# Patient Record
Sex: FEMALE | Employment: OTHER | URBAN - METROPOLITAN AREA
[De-identification: names, ages, dates, MRNs, and addresses within clinical notes are randomized per-mention and may not be internally consistent; named-entity substitution may affect disease eponyms.]

---

## 2019-08-09 ENCOUNTER — HOSPITAL ENCOUNTER (OUTPATIENT)
Dept: MRI IMAGING | Age: 84
Discharge: HOME | End: 2019-08-09
Attending: GENERAL PRACTICE
Payer: MEDICARE

## 2019-08-09 DIAGNOSIS — R93.7 ABNORMAL FINDINGS ON DIAGNOSTIC IMAGING OF SPINE: ICD-10-CM

## 2019-08-09 DIAGNOSIS — S32.040A WEDGE COMPRESSION FRACTURE OF FOURTH LUMBAR VERTEBRA (HCC): ICD-10-CM

## 2019-08-09 DIAGNOSIS — M41.26 IDIOPATHIC SCOLIOSIS OF LUMBAR SPINE: ICD-10-CM

## 2019-08-09 DIAGNOSIS — M47.26 OTHER SPONDYLOSIS WITH RADICULOPATHY, LUMBAR REGION: ICD-10-CM

## 2019-08-09 PROCEDURE — 72148 MRI LUMBAR SPINE W/O DYE: CPT

## 2019-08-19 ENCOUNTER — HOSPITAL ENCOUNTER (OUTPATIENT)
Dept: INTERVENTIONAL RADIOLOGY/VASCULAR | Age: 84
Discharge: HOME | End: 2019-08-19
Attending: GENERAL PRACTICE | Admitting: RADIOLOGY
Payer: MEDICARE

## 2019-08-19 VITALS
RESPIRATION RATE: 14 BRPM | WEIGHT: 114 LBS | HEIGHT: 59 IN | TEMPERATURE: 97.9 F | HEART RATE: 65 BPM | SYSTOLIC BLOOD PRESSURE: 128 MMHG | OXYGEN SATURATION: 98 % | BODY MASS INDEX: 22.98 KG/M2 | DIASTOLIC BLOOD PRESSURE: 54 MMHG

## 2019-08-19 DIAGNOSIS — S32.040A WEDGE COMPRESSION FRACTURE OF FOURTH LUMBAR VERTEBRA (HCC): ICD-10-CM

## 2019-08-19 PROCEDURE — 74011250636 HC RX REV CODE- 250/636: Performed by: RADIOLOGY

## 2019-08-19 PROCEDURE — 77030021783 HC SYS CEM DEL MEDT -D

## 2019-08-19 PROCEDURE — 22514 PERQ VERTEBRAL AUGMENTATION: CPT

## 2019-08-19 PROCEDURE — 77030034842 HC TAMP SPN BN INFL EXP II KYPH -I

## 2019-08-19 PROCEDURE — 74011636320 HC RX REV CODE- 636/320: Performed by: RADIOLOGY

## 2019-08-19 PROCEDURE — C1713 ANCHOR/SCREW BN/BN,TIS/BN: HCPCS

## 2019-08-19 PROCEDURE — 74011250637 HC RX REV CODE- 250/637: Performed by: RADIOLOGY

## 2019-08-19 PROCEDURE — 77030003666 HC NDL SPINAL BD -A

## 2019-08-19 PROCEDURE — 77030021782 HC SYS CEM CART DEL KYPH -C

## 2019-08-19 PROCEDURE — 74011000250 HC RX REV CODE- 250: Performed by: RADIOLOGY

## 2019-08-19 RX ORDER — KETOROLAC TROMETHAMINE 30 MG/ML
15 INJECTION, SOLUTION INTRAMUSCULAR; INTRAVENOUS AS NEEDED
Status: DISCONTINUED | OUTPATIENT
Start: 2019-08-19 | End: 2019-08-19 | Stop reason: HOSPADM

## 2019-08-19 RX ORDER — LIDOCAINE HYDROCHLORIDE 10 MG/ML
10 INJECTION, SOLUTION EPIDURAL; INFILTRATION; INTRACAUDAL; PERINEURAL ONCE
Status: COMPLETED | OUTPATIENT
Start: 2019-08-19 | End: 2019-08-19

## 2019-08-19 RX ORDER — IBUPROFEN 200 MG
200 TABLET ORAL
COMMUNITY
End: 2020-11-19

## 2019-08-19 RX ORDER — TRIPROLIDINE/PSEUDOEPHEDRINE 2.5MG-60MG
200 TABLET ORAL
Status: COMPLETED | OUTPATIENT
Start: 2019-08-19 | End: 2019-08-19

## 2019-08-19 RX ORDER — SODIUM CHLORIDE 9 MG/ML
25 INJECTION, SOLUTION INTRAVENOUS CONTINUOUS
Status: DISCONTINUED | OUTPATIENT
Start: 2019-08-19 | End: 2019-08-19 | Stop reason: HOSPADM

## 2019-08-19 RX ORDER — CEFAZOLIN SODIUM/WATER 2 G/20 ML
2 SYRINGE (ML) INTRAVENOUS ONCE
Status: COMPLETED | OUTPATIENT
Start: 2019-08-19 | End: 2019-08-19

## 2019-08-19 RX ORDER — MIDAZOLAM HYDROCHLORIDE 1 MG/ML
5 INJECTION, SOLUTION INTRAMUSCULAR; INTRAVENOUS
Status: DISCONTINUED | OUTPATIENT
Start: 2019-08-19 | End: 2019-08-19 | Stop reason: HOSPADM

## 2019-08-19 RX ORDER — FENTANYL CITRATE 50 UG/ML
200 INJECTION, SOLUTION INTRAMUSCULAR; INTRAVENOUS
Status: DISCONTINUED | OUTPATIENT
Start: 2019-08-19 | End: 2019-08-19 | Stop reason: HOSPADM

## 2019-08-19 RX ORDER — BACITRACIN 500 UNIT/G
PACKET (EA) TOPICAL
Status: DISCONTINUED
Start: 2019-08-19 | End: 2019-08-19 | Stop reason: HOSPADM

## 2019-08-19 RX ORDER — BUPIVACAINE HYDROCHLORIDE 5 MG/ML
30 INJECTION, SOLUTION EPIDURAL; INTRACAUDAL ONCE
Status: COMPLETED | OUTPATIENT
Start: 2019-08-19 | End: 2019-08-19

## 2019-08-19 RX ADMIN — MIDAZOLAM HYDROCHLORIDE 0.5 MG: 1 INJECTION, SOLUTION INTRAMUSCULAR; INTRAVENOUS at 10:22

## 2019-08-19 RX ADMIN — MIDAZOLAM HYDROCHLORIDE 1 MG: 1 INJECTION, SOLUTION INTRAMUSCULAR; INTRAVENOUS at 10:28

## 2019-08-19 RX ADMIN — FENTANYL CITRATE 25 MCG: 50 INJECTION, SOLUTION INTRAMUSCULAR; INTRAVENOUS at 10:17

## 2019-08-19 RX ADMIN — MIDAZOLAM HYDROCHLORIDE 0.5 MG: 1 INJECTION, SOLUTION INTRAMUSCULAR; INTRAVENOUS at 10:17

## 2019-08-19 RX ADMIN — LIDOCAINE HYDROCHLORIDE 10 ML: 10 INJECTION, SOLUTION EPIDURAL; INFILTRATION; INTRACAUDAL; PERINEURAL at 10:44

## 2019-08-19 RX ADMIN — BUPIVACAINE HYDROCHLORIDE 10 ML: 5 INJECTION, SOLUTION EPIDURAL; INTRACAUDAL; PERINEURAL at 10:43

## 2019-08-19 RX ADMIN — SODIUM CHLORIDE 25 ML/HR: 900 INJECTION, SOLUTION INTRAVENOUS at 09:30

## 2019-08-19 RX ADMIN — FENTANYL CITRATE 25 MCG: 50 INJECTION, SOLUTION INTRAMUSCULAR; INTRAVENOUS at 10:15

## 2019-08-19 RX ADMIN — IBUPROFEN 200 MG: 200 SUSPENSION ORAL at 12:46

## 2019-08-19 RX ADMIN — KETOROLAC TROMETHAMINE 15 MG: 30 INJECTION, SOLUTION INTRAMUSCULAR at 10:00

## 2019-08-19 RX ADMIN — Medication 2 G: at 09:59

## 2019-08-19 RX ADMIN — MIDAZOLAM HYDROCHLORIDE 0.5 MG: 1 INJECTION, SOLUTION INTRAMUSCULAR; INTRAVENOUS at 10:15

## 2019-08-19 RX ADMIN — IOHEXOL 20 ML: 240 INJECTION, SOLUTION INTRATHECAL; INTRAVASCULAR; INTRAVENOUS; ORAL at 10:43

## 2019-08-19 RX ADMIN — FENTANYL CITRATE 25 MCG: 50 INJECTION, SOLUTION INTRAMUSCULAR; INTRAVENOUS at 10:22

## 2019-08-19 RX ADMIN — FENTANYL CITRATE 25 MCG: 50 INJECTION, SOLUTION INTRAMUSCULAR; INTRAVENOUS at 10:28

## 2019-08-19 RX ADMIN — MIDAZOLAM HYDROCHLORIDE 0.5 MG: 1 INJECTION, SOLUTION INTRAMUSCULAR; INTRAVENOUS at 10:31

## 2019-08-19 NOTE — H&P
Radiology History and Physical    Patient: Lashay Fields 80 y.o. female       Chief Complaint: No chief complaint on file. History of Present Illness: for kypho    History:    Past Medical History:   Diagnosis Date    Cancer ([facility] [address]     radiation and lumpectony    GERD (gastroesophageal reflux disease)      Family History   Problem Relation Age of Onset    Hypertension Mother     Heart Disease Mother     Cancer Mother         breast    Cancer Father         prostate     Social History     Socioeconomic History    Marital status:      Spouse name: Not on file    Number of children: Not on file    Years of education: Not on file    Highest education level: Not on file   Occupational History    Not on file   Social Needs    Financial resource strain: Not on file    Food insecurity:     Worry: Not on file     Inability: Not on file    Transportation needs:     Medical: Not on file     Non-medical: Not on file   Tobacco Use    Smoking status: Former Smoker    Smokeless tobacco: Never Used   Substance and Sexual Activity    Alcohol use: No    Drug use: No    Sexual activity: Not on file   Lifestyle    Physical activity:     Days per week: Not on file     Minutes per session: Not on file    Stress: Not on file   Relationships    Social connections:     Talks on phone: Not on file     Gets together: Not on file     Attends Alevism service: Not on file     Active member of club or organization: Not on file     Attends meetings of clubs or organizations: Not on file     Relationship status: Not on file    Intimate partner violence:     Fear of current or ex partner: Not on file     Emotionally abused: Not on file     Physically abused: Not on file     Forced sexual activity: Not on file   Other Topics Concern    Not on file   Social History Narrative    Not on file       Allergies:    Allergies   Allergen Reactions    Ciprofloxacin Other (comments)     Bad yeast inf    Codeine Nausea and Vomiting    Hydrocodone Other (comments)     \"burning the esophagus to the point patient can't swallow per family and patient\"    Morphine Nausea Only    Oxycodone Other (comments)     \"burning of esophagus\"       Current Medications:  Current Facility-Administered Medications   Medication Dose Route Frequency    lidocaine (PF) (XYLOCAINE) 10 mg/mL (1 %) injection 10 mL  10 mL SubCUTAneous ONCE    iohexol (OMNIPAQUE) 240 mg iodine/mL solution 30 mL  30 mL Intrathecal RAD ONCE    bupivacaine (PF) (MARCAINE) 0.5 % (5 mg/mL) injection 150 mg  30 mL SubCUTAneous ONCE    0.9% sodium chloride infusion  25 mL/hr IntraVENous CONTINUOUS    fentaNYL citrate (PF) injection 200 mcg  200 mcg IntraVENous Multiple    midazolam (VERSED) injection 5 mg  5 mg IntraVENous Multiple    ketorolac (TORADOL) injection 15 mg  15 mg IntraVENous PRN        Physical Exam:  Blood pressure 158/75, pulse 96, temperature 97.9 °F (36.6 °C), resp. rate 14, height 4' 11\" (1.499 m), weight 51.7 kg (114 lb), SpO2 100 %. LUNG: clear to auscultation bilaterally, HEART: regular rate and rhythm      Alerts:    Hospital Problems  Date Reviewed: 3/11/2019    None          Laboratory:    No results for input(s): HGB, HCT, WBC, PLT, INR, BUN, CREA, K, CRCLT, HGBEXT, HCTEXT, PLTEXT in the last 72 hours. No lab exists for component: PTT, PT, INREXT      Plan of Care/Planned Procedure:  Risks, benefits, and alternatives reviewed with patient and she agrees to proceed with the procedure.        Shun Bourgeois MD

## 2019-08-19 NOTE — DISCHARGE INSTRUCTIONS
KYPHOPLASTY/VERTEBROPLASTY DISCHARGE INSTRUCTIONS    General Information: This procedure is done to help with the back pain that is associated with compression fractures in the spine. The kyphoplasty involves placing a balloon into the space of the vertebrae that is fractured, blowing up the balloon, therefore realigning the broken pieces of bone, and then injecting cement into the space to strengthen the vertebrae. The vertebroplasty is only slightly different in that there is no balloon used. The Interventional Radiologist will speak with you and decide which procedure is best for you. The pain experienced from compression fractures is caused by the vertebrae not being stabilized. The cement stabilizes the bone, therefore reducing the pain. Home Care Instructions: You can resume your regular diet and medication regimen. Do not drink alcohol, drive, or make any important legal decisions in the next 24 hours. Do not lift anything heavier than a gallon of milk, or do anything strenuous for the next 24 hours. You will notice a dressing on your lower back after your procedure. This dressing can be removed in 24 hours. Showering is acceptable in 24 hours, but you should refrain from tub baths or swimming for 5 days. You will be asked to come back in for a recheck about 30 days after your procedure. At that time, our physician will ask you questions about your pain, and if it has improved. Call If:     You should call your Physician and/or the Radiology Nurse if you have any bleeding other than a small spot on your bandage. Call if you have any signs of infection, fever, or increased pain at the site. Call if you should have new or worsening pain in your back, or if you lose control of your bladder or bowel. Any tingling or loss of feeling or movement in your legs should also be reported. Follow-Up Instructions: Please see your ordering doctor as he/she has requested.      To Reach Us:  [phone] to 10 pm then [phone] after 10 pm    For left hand skin tear, change dressing once a day. Clean with normal saline, pat dry with gauze then apply telfa, non adhering dressing and wrap with coban. Please call above number for any concerns.     Patient Signature:  Date: 8/19/2019  Discharging Nurse: Malika Powell RN

## 2020-09-26 PROBLEM — D69.2 SENILE PURPURA (HCC): Status: ACTIVE | Noted: 2020-09-26

## 2020-11-13 ENCOUNTER — APPOINTMENT (OUTPATIENT)
Dept: GENERAL RADIOLOGY | Age: 85
DRG: 482 | End: 2020-11-13
Attending: STUDENT IN AN ORGANIZED HEALTH CARE EDUCATION/TRAINING PROGRAM
Payer: MEDICARE

## 2020-11-13 ENCOUNTER — APPOINTMENT (OUTPATIENT)
Dept: CT IMAGING | Age: 85
DRG: 482 | End: 2020-11-13
Attending: STUDENT IN AN ORGANIZED HEALTH CARE EDUCATION/TRAINING PROGRAM
Payer: MEDICARE

## 2020-11-13 ENCOUNTER — HOSPITAL ENCOUNTER (INPATIENT)
Age: 85
LOS: 5 days | Discharge: HOME HEALTH | DRG: 482 | End: 2020-11-19
Attending: STUDENT IN AN ORGANIZED HEALTH CARE EDUCATION/TRAINING PROGRAM | Admitting: INTERNAL MEDICINE
Payer: MEDICARE

## 2020-11-13 DIAGNOSIS — S72.001A CLOSED FRACTURE OF NECK OF RIGHT FEMUR, INITIAL ENCOUNTER (HCC): Primary | ICD-10-CM

## 2020-11-13 PROBLEM — I48.0 PAROXYSMAL ATRIAL FIBRILLATION (HCC): Status: ACTIVE | Noted: 2020-11-13

## 2020-11-13 PROBLEM — I48.20 CHRONIC ATRIAL FIBRILLATION (HCC): Status: ACTIVE | Noted: 2020-11-13

## 2020-11-13 LAB
ALBUMIN SERPL-MCNC: 4 G/DL (ref 3.5–5)
ALBUMIN/GLOB SERPL: 1.1 {RATIO} (ref 1.1–2.2)
ALP SERPL-CCNC: 91 U/L (ref 45–117)
ALT SERPL-CCNC: 28 U/L (ref 12–78)
ANION GAP SERPL CALC-SCNC: 8 MMOL/L (ref 5–15)
AST SERPL-CCNC: 22 U/L (ref 15–37)
BASOPHILS # BLD: 0 K/UL (ref 0–0.1)
BASOPHILS NFR BLD: 0 % (ref 0–1)
BILIRUB SERPL-MCNC: 0.7 MG/DL (ref 0.2–1)
BUN SERPL-MCNC: 29 MG/DL (ref 6–20)
BUN/CREAT SERPL: 29 (ref 12–20)
CALCIUM SERPL-MCNC: 9 MG/DL (ref 8.5–10.1)
CHLORIDE SERPL-SCNC: 100 MMOL/L (ref 97–108)
CO2 SERPL-SCNC: 28 MMOL/L (ref 21–32)
COMMENT, HOLDF: NORMAL
CREAT SERPL-MCNC: 0.99 MG/DL (ref 0.55–1.02)
DIFFERENTIAL METHOD BLD: ABNORMAL
EOSINOPHIL # BLD: 0 K/UL (ref 0–0.4)
EOSINOPHIL NFR BLD: 0 % (ref 0–7)
ERYTHROCYTE [DISTWIDTH] IN BLOOD BY AUTOMATED COUNT: 15.3 % (ref 11.5–14.5)
GLOBULIN SER CALC-MCNC: 3.6 G/DL (ref 2–4)
GLUCOSE SERPL-MCNC: 116 MG/DL (ref 65–100)
HCT VFR BLD AUTO: 34.3 % (ref 35–47)
HGB BLD-MCNC: 11.1 G/DL (ref 11.5–16)
IMM GRANULOCYTES # BLD AUTO: 0 K/UL (ref 0–0.04)
IMM GRANULOCYTES NFR BLD AUTO: 0 % (ref 0–0.5)
LYMPHOCYTES # BLD: 0.2 K/UL (ref 0.8–3.5)
LYMPHOCYTES NFR BLD: 2 % (ref 12–49)
MCH RBC QN AUTO: 27.9 PG (ref 26–34)
MCHC RBC AUTO-ENTMCNC: 32.4 G/DL (ref 30–36.5)
MCV RBC AUTO: 86.2 FL (ref 80–99)
MONOCYTES # BLD: 1.4 K/UL (ref 0–1)
MONOCYTES NFR BLD: 12 % (ref 5–13)
NEUTS SEG # BLD: 9.8 K/UL (ref 1.8–8)
NEUTS SEG NFR BLD: 86 % (ref 32–75)
NRBC # BLD: 0 K/UL (ref 0–0.01)
NRBC BLD-RTO: 0 PER 100 WBC
PLATELET # BLD AUTO: 206 K/UL (ref 150–400)
PMV BLD AUTO: 10.6 FL (ref 8.9–12.9)
POTASSIUM SERPL-SCNC: 3.4 MMOL/L (ref 3.5–5.1)
PROT SERPL-MCNC: 7.6 G/DL (ref 6.4–8.2)
RBC # BLD AUTO: 3.98 M/UL (ref 3.8–5.2)
RBC MORPH BLD: ABNORMAL
SAMPLES BEING HELD,HOLD: NORMAL
SODIUM SERPL-SCNC: 136 MMOL/L (ref 136–145)
WBC # BLD AUTO: 11.4 K/UL (ref 3.6–11)

## 2020-11-13 PROCEDURE — 72192 CT PELVIS W/O DYE: CPT

## 2020-11-13 PROCEDURE — 36415 COLL VENOUS BLD VENIPUNCTURE: CPT

## 2020-11-13 PROCEDURE — 71045 X-RAY EXAM CHEST 1 VIEW: CPT

## 2020-11-13 PROCEDURE — 96374 THER/PROPH/DIAG INJ IV PUSH: CPT

## 2020-11-13 PROCEDURE — 96375 TX/PRO/DX INJ NEW DRUG ADDON: CPT

## 2020-11-13 PROCEDURE — 74011250636 HC RX REV CODE- 250/636: Performed by: STUDENT IN AN ORGANIZED HEALTH CARE EDUCATION/TRAINING PROGRAM

## 2020-11-13 PROCEDURE — 80053 COMPREHEN METABOLIC PANEL: CPT

## 2020-11-13 PROCEDURE — 86900 BLOOD TYPING SEROLOGIC ABO: CPT

## 2020-11-13 PROCEDURE — 74011250637 HC RX REV CODE- 250/637: Performed by: PHYSICIAN ASSISTANT

## 2020-11-13 PROCEDURE — 85025 COMPLETE CBC W/AUTO DIFF WBC: CPT

## 2020-11-13 PROCEDURE — 99285 EMERGENCY DEPT VISIT HI MDM: CPT

## 2020-11-13 PROCEDURE — 73502 X-RAY EXAM HIP UNI 2-3 VIEWS: CPT

## 2020-11-13 PROCEDURE — 93005 ELECTROCARDIOGRAM TRACING: CPT

## 2020-11-13 RX ORDER — ACETAMINOPHEN 325 MG/1
650 TABLET ORAL
Status: DISCONTINUED | OUTPATIENT
Start: 2020-11-13 | End: 2020-11-19 | Stop reason: HOSPADM

## 2020-11-13 RX ORDER — FENTANYL CITRATE 50 UG/ML
50 INJECTION, SOLUTION INTRAMUSCULAR; INTRAVENOUS ONCE
Status: COMPLETED | OUTPATIENT
Start: 2020-11-13 | End: 2020-11-13

## 2020-11-13 RX ORDER — ONDANSETRON 2 MG/ML
4 INJECTION INTRAMUSCULAR; INTRAVENOUS
Status: COMPLETED | OUTPATIENT
Start: 2020-11-13 | End: 2020-11-13

## 2020-11-13 RX ADMIN — FENTANYL CITRATE 50 MCG: 50 INJECTION, SOLUTION INTRAMUSCULAR; INTRAVENOUS at 19:20

## 2020-11-13 RX ADMIN — ONDANSETRON 4 MG: 2 INJECTION INTRAMUSCULAR; INTRAVENOUS at 19:20

## 2020-11-13 RX ADMIN — ACETAMINOPHEN 650 MG: 325 TABLET ORAL at 22:57

## 2020-11-13 NOTE — ED TRIAGE NOTES
Triage: Pt arrives from [facility] where she was diagnosed with a right hip fracture by xray. Pt fell yesterday and landed on the extremity.

## 2020-11-13 NOTE — ED PROVIDER NOTES
Patient is a 70-year-old female presenting to the emergency department for right hip pain. She had a ground-level fall last night was able to crawl get back into bed went to [facility] today at which they shot a x-ray showing that there was a fracture of the right hip. [facility] called 911 and Ryleeshay hodge brings patient in for further evaluation.            Past Medical History:   Diagnosis Date    Cancer ([address]     radiation and lumpectony    GERD (gastroesophageal reflux disease)        Past Surgical History:   Procedure Laterality Date    BREAST SURGERY PROCEDURE UNLISTED      IR KYPHOPLASTY LUMBAR  8/19/2019         Family History:   Problem Relation Age of Onset    Hypertension Mother     Heart Disease Mother     Cancer Mother         breast    Cancer Father         prostate       Social History     Socioeconomic History    Marital status:      Spouse name: Not on file    Number of children: Not on file    Years of education: Not on file    Highest education level: Not on file   Occupational History    Not on file   Social Needs    Financial resource strain: Not on file    Food insecurity     Worry: Not on file     Inability: Not on file    Transportation needs     Medical: Not on file     Non-medical: Not on file   Tobacco Use    Smoking status: Former Smoker    Smokeless tobacco: Never Used   Substance and Sexual Activity    Alcohol use: No    Drug use: No    Sexual activity: Not on file   Lifestyle    Physical activity     Days per week: Not on file     Minutes per session: Not on file    Stress: Not on file   Relationships    Social connections     Talks on phone: Not on file     Gets together: Not on file     Attends Taoist service: Not on file     Active member of club or organization: Not on file     Attends meetings of clubs or organizations: Not on file     Relationship status: Not on file    Intimate partner violence     Fear of current or ex partner: Not on file     Emotionally abused: Not on file     Physically abused: Not on file     Forced sexual activity: Not on file   Other Topics Concern    Not on file   Social History Narrative    Not on file         ALLERGIES: Ciprofloxacin; Codeine; Hydrocodone; Morphine; and Oxycodone    Review of Systems   Musculoskeletal: Positive for arthralgias, gait problem, joint swelling and myalgias. All other systems reviewed and are negative. Vitals:    11/13/20 1651 11/13/20 1655   BP: (!) (P) 163/70    Pulse: (P) 99    Resp: (P) 18    Temp: (P) 98.6 °F (37 °C)    SpO2: (P) 97%    Weight:  56.7 kg (125 lb)   Height: (P) 5' 3\" (1.6 m) 5' (1.524 m)            Physical Exam  Constitutional:       Appearance: Normal appearance. HENT:      Head: Normocephalic and atraumatic. Eyes:      Extraocular Movements: Extraocular movements intact. Pupils: Pupils are equal, round, and reactive to light. Neck:      Musculoskeletal: Normal range of motion and neck supple. Cardiovascular:      Rate and Rhythm: Tachycardia present. Rhythm irregular. Pulses: Normal pulses. Heart sounds: Normal heart sounds. Pulmonary:      Effort: Pulmonary effort is normal.      Breath sounds: Normal breath sounds. Musculoskeletal:      Right hip: She exhibits decreased range of motion, decreased strength, tenderness and bony tenderness. She exhibits no swelling and no crepitus. Skin:     General: Skin is warm and dry. Neurological:      General: No focal deficit present. Mental Status: She is alert and oriented to person, place, and time. Psychiatric:         Mood and Affect: Mood normal.         Behavior: Behavior normal.          MDM  Number of Diagnoses or Management Options  Diagnosis management comments: A/P: Right hip fracture, ground-level fall. 63-year-old female presenting after ground-level fall last night presenting today with right hip fracture.   Patient will require admission preop labs including chest x-ray, EKG will consult orthopedics. Procedures             ED EKG interpretation:  Rhythm: atrial fib; and irregular. Rate (approx.): 104; Axis: normal; QRS interval: normal ; ST/T wave: normal; in  Lead: II ; Other findings: borderline ekg. EKG documented by Rebecca Mesa MD,      Perfect Serve Consult for Admission  6:18 PM    ED Room Number: ER10/10  Patient Name and age:  Nayeli Gaxiola 80 y.o.  female  Working Diagnosis:   1.  Closed fracture of neck of right femur, initial encounter ([facility] [address]        COVID-19 Suspicion:  no  Sepsis present:  no  Reassessment needed: no  Code Status:  Full Code  Readmission: no  Isolation Requirements:  no  Recommended Level of Care:  med/surg  Department:[facility] Adult ED - 21   Other:

## 2020-11-14 ENCOUNTER — ANESTHESIA (OUTPATIENT)
Dept: SURGERY | Age: 85
DRG: 482 | End: 2020-11-14
Payer: MEDICARE

## 2020-11-14 ENCOUNTER — APPOINTMENT (OUTPATIENT)
Dept: GENERAL RADIOLOGY | Age: 85
DRG: 482 | End: 2020-11-14
Attending: FAMILY MEDICINE
Payer: MEDICARE

## 2020-11-14 ENCOUNTER — ANESTHESIA EVENT (OUTPATIENT)
Dept: SURGERY | Age: 85
DRG: 482 | End: 2020-11-14
Payer: MEDICARE

## 2020-11-14 PROBLEM — S72.009A HIP FRACTURE (HCC): Status: ACTIVE | Noted: 2020-11-14

## 2020-11-14 LAB
ABO + RH BLD: NORMAL
ALBUMIN SERPL-MCNC: 3.6 G/DL (ref 3.5–5)
ALBUMIN/GLOB SERPL: 1 {RATIO} (ref 1.1–2.2)
ALP SERPL-CCNC: 85 U/L (ref 45–117)
ALT SERPL-CCNC: 23 U/L (ref 12–78)
ANION GAP SERPL CALC-SCNC: 12 MMOL/L (ref 5–15)
APPEARANCE UR: CLEAR
AST SERPL-CCNC: 30 U/L (ref 15–37)
ATRIAL RATE: 104 BPM
BACTERIA URNS QL MICRO: NEGATIVE /HPF
BASOPHILS # BLD: 0 K/UL (ref 0–0.1)
BASOPHILS NFR BLD: 0 % (ref 0–1)
BILIRUB SERPL-MCNC: 0.9 MG/DL (ref 0.2–1)
BILIRUB UR QL: NEGATIVE
BLOOD GROUP ANTIBODIES SERPL: NORMAL
BUN SERPL-MCNC: 22 MG/DL (ref 6–20)
BUN/CREAT SERPL: 22 (ref 12–20)
CALCIUM SERPL-MCNC: 8.6 MG/DL (ref 8.5–10.1)
CALCULATED R AXIS, ECG10: 70 DEGREES
CALCULATED T AXIS, ECG11: 83 DEGREES
CHLORIDE SERPL-SCNC: 103 MMOL/L (ref 97–108)
CO2 SERPL-SCNC: 19 MMOL/L (ref 21–32)
COLOR UR: ABNORMAL
CREAT SERPL-MCNC: 0.99 MG/DL (ref 0.55–1.02)
DIAGNOSIS, 93000: NORMAL
DIFFERENTIAL METHOD BLD: ABNORMAL
EOSINOPHIL # BLD: 0.1 K/UL (ref 0–0.4)
EOSINOPHIL NFR BLD: 1 % (ref 0–7)
EPITH CASTS URNS QL MICRO: ABNORMAL /LPF
ERYTHROCYTE [DISTWIDTH] IN BLOOD BY AUTOMATED COUNT: 15.1 % (ref 11.5–14.5)
GLOBULIN SER CALC-MCNC: 3.6 G/DL (ref 2–4)
GLUCOSE SERPL-MCNC: 87 MG/DL (ref 65–100)
GLUCOSE UR STRIP.AUTO-MCNC: NEGATIVE MG/DL
HCT VFR BLD AUTO: 34.1 % (ref 35–47)
HGB BLD-MCNC: 11.1 G/DL (ref 11.5–16)
HGB UR QL STRIP: ABNORMAL
HYALINE CASTS URNS QL MICRO: ABNORMAL /LPF (ref 0–5)
IMM GRANULOCYTES # BLD AUTO: 0 K/UL (ref 0–0.04)
IMM GRANULOCYTES NFR BLD AUTO: 0 % (ref 0–0.5)
KETONES UR QL STRIP.AUTO: 15 MG/DL
LEUKOCYTE ESTERASE UR QL STRIP.AUTO: NEGATIVE
LYMPHOCYTES # BLD: 0.5 K/UL (ref 0.8–3.5)
LYMPHOCYTES NFR BLD: 5 % (ref 12–49)
MCH RBC QN AUTO: 28.3 PG (ref 26–34)
MCHC RBC AUTO-ENTMCNC: 32.6 G/DL (ref 30–36.5)
MCV RBC AUTO: 87 FL (ref 80–99)
MONOCYTES # BLD: 1.4 K/UL (ref 0–1)
MONOCYTES NFR BLD: 12 % (ref 5–13)
NEUTS SEG # BLD: 9.8 K/UL (ref 1.8–8)
NEUTS SEG NFR BLD: 82 % (ref 32–75)
NITRITE UR QL STRIP.AUTO: NEGATIVE
NRBC # BLD: 0 K/UL (ref 0–0.01)
NRBC BLD-RTO: 0 PER 100 WBC
PH UR STRIP: 7 [PH] (ref 5–8)
PLATELET # BLD AUTO: 171 K/UL (ref 150–400)
PMV BLD AUTO: 10.4 FL (ref 8.9–12.9)
POTASSIUM SERPL-SCNC: 5.1 MMOL/L (ref 3.5–5.1)
PROT SERPL-MCNC: 7.2 G/DL (ref 6.4–8.2)
PROT UR STRIP-MCNC: 30 MG/DL
Q-T INTERVAL, ECG07: 438 MS
QRS DURATION, ECG06: 78 MS
QTC CALCULATION (BEZET), ECG08: 575 MS
RBC # BLD AUTO: 3.92 M/UL (ref 3.8–5.2)
RBC #/AREA URNS HPF: ABNORMAL /HPF (ref 0–5)
SODIUM SERPL-SCNC: 134 MMOL/L (ref 136–145)
SP GR UR REFRACTOMETRY: 1.02 (ref 1–1.03)
SPECIMEN EXP DATE BLD: NORMAL
TROPONIN I SERPL-MCNC: <0.05 NG/ML
UA: UC IF INDICATED,UAUC: ABNORMAL
UROBILINOGEN UR QL STRIP.AUTO: 1 EU/DL (ref 0.2–1)
VENTRICULAR RATE, ECG03: 104 BPM
WBC # BLD AUTO: 11.9 K/UL (ref 3.6–11)
WBC URNS QL MICRO: ABNORMAL /HPF (ref 0–4)

## 2020-11-14 PROCEDURE — 74011250636 HC RX REV CODE- 250/636: Performed by: PHYSICIAN ASSISTANT

## 2020-11-14 PROCEDURE — 76010000149 HC OR TIME 1 TO 1.5 HR: Performed by: ORTHOPAEDIC SURGERY

## 2020-11-14 PROCEDURE — 74011250636 HC RX REV CODE- 250/636: Performed by: ORTHOPAEDIC SURGERY

## 2020-11-14 PROCEDURE — 74011250636 HC RX REV CODE- 250/636: Performed by: NURSE ANESTHETIST, CERTIFIED REGISTERED

## 2020-11-14 PROCEDURE — 2709999900 HC NON-CHARGEABLE SUPPLY: Performed by: ORTHOPAEDIC SURGERY

## 2020-11-14 PROCEDURE — 65270000029 HC RM PRIVATE

## 2020-11-14 PROCEDURE — 77030016458 HC BIT DRL CANN1 SYNT -F: Performed by: ORTHOPAEDIC SURGERY

## 2020-11-14 PROCEDURE — 77030038552 HC DRN WND MDII -A: Performed by: ORTHOPAEDIC SURGERY

## 2020-11-14 PROCEDURE — 74011250637 HC RX REV CODE- 250/637: Performed by: ORTHOPAEDIC SURGERY

## 2020-11-14 PROCEDURE — 77030008462 HC STPLR SKN PROX J&J -A: Performed by: ORTHOPAEDIC SURGERY

## 2020-11-14 PROCEDURE — 80053 COMPREHEN METABOLIC PANEL: CPT

## 2020-11-14 PROCEDURE — 0QS634Z REPOSITION RIGHT UPPER FEMUR WITH INTERNAL FIXATION DEVICE, PERCUTANEOUS APPROACH: ICD-10-PCS | Performed by: ORTHOPAEDIC SURGERY

## 2020-11-14 PROCEDURE — 81001 URINALYSIS AUTO W/SCOPE: CPT

## 2020-11-14 PROCEDURE — C1769 GUIDE WIRE: HCPCS | Performed by: ORTHOPAEDIC SURGERY

## 2020-11-14 PROCEDURE — 77030020788: Performed by: ORTHOPAEDIC SURGERY

## 2020-11-14 PROCEDURE — 94760 N-INVAS EAR/PLS OXIMETRY 1: CPT

## 2020-11-14 PROCEDURE — 74011250636 HC RX REV CODE- 250/636: Performed by: INTERNAL MEDICINE

## 2020-11-14 PROCEDURE — 36415 COLL VENOUS BLD VENIPUNCTURE: CPT

## 2020-11-14 PROCEDURE — 74011000250 HC RX REV CODE- 250: Performed by: INTERNAL MEDICINE

## 2020-11-14 PROCEDURE — 77010033678 HC OXYGEN DAILY

## 2020-11-14 PROCEDURE — 74011000250 HC RX REV CODE- 250: Performed by: NURSE ANESTHETIST, CERTIFIED REGISTERED

## 2020-11-14 PROCEDURE — 76210000016 HC OR PH I REC 1 TO 1.5 HR: Performed by: ORTHOPAEDIC SURGERY

## 2020-11-14 PROCEDURE — 84484 ASSAY OF TROPONIN QUANT: CPT

## 2020-11-14 PROCEDURE — 77030031139 HC SUT VCRL2 J&J -A: Performed by: ORTHOPAEDIC SURGERY

## 2020-11-14 PROCEDURE — C9113 INJ PANTOPRAZOLE SODIUM, VIA: HCPCS | Performed by: INTERNAL MEDICINE

## 2020-11-14 PROCEDURE — 76060000033 HC ANESTHESIA 1 TO 1.5 HR: Performed by: ORTHOPAEDIC SURGERY

## 2020-11-14 PROCEDURE — C1713 ANCHOR/SCREW BN/BN,TIS/BN: HCPCS | Performed by: ORTHOPAEDIC SURGERY

## 2020-11-14 PROCEDURE — 77030018836 HC SOL IRR NACL ICUM -A: Performed by: ORTHOPAEDIC SURGERY

## 2020-11-14 PROCEDURE — 85025 COMPLETE CBC W/AUTO DIFF WBC: CPT

## 2020-11-14 PROCEDURE — 73501 X-RAY EXAM HIP UNI 1 VIEW: CPT

## 2020-11-14 DEVICE — SCREW BNE L85MM DIA7.3MM THRD L16MM CANC S STL SELF DRL ST
Type: IMPLANTABLE DEVICE | Site: HIP | Status: NON-FUNCTIONAL
Removed: 2021-02-06

## 2020-11-14 DEVICE — SCREW BNE L80MM DIA7.3MM THRD L16MM CANC S STL SELF DRL ST
Type: IMPLANTABLE DEVICE | Site: HIP | Status: NON-FUNCTIONAL
Removed: 2021-02-06

## 2020-11-14 RX ORDER — LIDOCAINE HYDROCHLORIDE 10 MG/ML
0.1 INJECTION, SOLUTION EPIDURAL; INFILTRATION; INTRACAUDAL; PERINEURAL AS NEEDED
Status: DISCONTINUED | OUTPATIENT
Start: 2020-11-14 | End: 2020-11-14 | Stop reason: HOSPADM

## 2020-11-14 RX ORDER — MIDAZOLAM HYDROCHLORIDE 1 MG/ML
1 INJECTION, SOLUTION INTRAMUSCULAR; INTRAVENOUS AS NEEDED
Status: DISCONTINUED | OUTPATIENT
Start: 2020-11-14 | End: 2020-11-14 | Stop reason: HOSPADM

## 2020-11-14 RX ORDER — CEFAZOLIN SODIUM/WATER 2 G/20 ML
2 SYRINGE (ML) INTRAVENOUS ONCE
Status: DISCONTINUED | OUTPATIENT
Start: 2020-11-14 | End: 2020-11-14 | Stop reason: HOSPADM

## 2020-11-14 RX ORDER — ENOXAPARIN SODIUM 100 MG/ML
30 INJECTION SUBCUTANEOUS DAILY
Status: DISCONTINUED | OUTPATIENT
Start: 2020-11-15 | End: 2020-11-19 | Stop reason: HOSPADM

## 2020-11-14 RX ORDER — ONDANSETRON 2 MG/ML
4 INJECTION INTRAMUSCULAR; INTRAVENOUS
Status: DISCONTINUED | OUTPATIENT
Start: 2020-11-14 | End: 2020-11-19 | Stop reason: HOSPADM

## 2020-11-14 RX ORDER — NALOXONE HYDROCHLORIDE 0.4 MG/ML
0.4 INJECTION, SOLUTION INTRAMUSCULAR; INTRAVENOUS; SUBCUTANEOUS AS NEEDED
Status: DISCONTINUED | OUTPATIENT
Start: 2020-11-14 | End: 2020-11-19 | Stop reason: HOSPADM

## 2020-11-14 RX ORDER — ONDANSETRON 2 MG/ML
INJECTION INTRAMUSCULAR; INTRAVENOUS AS NEEDED
Status: DISCONTINUED | OUTPATIENT
Start: 2020-11-14 | End: 2020-11-14 | Stop reason: HOSPADM

## 2020-11-14 RX ORDER — DEXMEDETOMIDINE HYDROCHLORIDE 100 UG/ML
INJECTION, SOLUTION INTRAVENOUS AS NEEDED
Status: DISCONTINUED | OUTPATIENT
Start: 2020-11-14 | End: 2020-11-14 | Stop reason: HOSPADM

## 2020-11-14 RX ORDER — DIPHENHYDRAMINE HYDROCHLORIDE 50 MG/ML
12.5 INJECTION, SOLUTION INTRAMUSCULAR; INTRAVENOUS AS NEEDED
Status: DISCONTINUED | OUTPATIENT
Start: 2020-11-14 | End: 2020-11-14 | Stop reason: HOSPADM

## 2020-11-14 RX ORDER — SODIUM CHLORIDE, SODIUM LACTATE, POTASSIUM CHLORIDE, CALCIUM CHLORIDE 600; 310; 30; 20 MG/100ML; MG/100ML; MG/100ML; MG/100ML
25 INJECTION, SOLUTION INTRAVENOUS CONTINUOUS
Status: DISCONTINUED | OUTPATIENT
Start: 2020-11-14 | End: 2020-11-14 | Stop reason: HOSPADM

## 2020-11-14 RX ORDER — ONDANSETRON 2 MG/ML
4 INJECTION INTRAMUSCULAR; INTRAVENOUS AS NEEDED
Status: DISCONTINUED | OUTPATIENT
Start: 2020-11-14 | End: 2020-11-14 | Stop reason: HOSPADM

## 2020-11-14 RX ORDER — SODIUM CHLORIDE 0.9 % (FLUSH) 0.9 %
5-40 SYRINGE (ML) INJECTION AS NEEDED
Status: DISCONTINUED | OUTPATIENT
Start: 2020-11-14 | End: 2020-11-14 | Stop reason: HOSPADM

## 2020-11-14 RX ORDER — FERROUS SULFATE, DRIED 160(50) MG
1 TABLET, EXTENDED RELEASE ORAL
Status: DISCONTINUED | OUTPATIENT
Start: 2020-11-15 | End: 2020-11-19 | Stop reason: HOSPADM

## 2020-11-14 RX ORDER — PHENYLEPHRINE HCL IN 0.9% NACL 0.4MG/10ML
SYRINGE (ML) INTRAVENOUS AS NEEDED
Status: DISCONTINUED | OUTPATIENT
Start: 2020-11-14 | End: 2020-11-14 | Stop reason: HOSPADM

## 2020-11-14 RX ORDER — HYDROMORPHONE HYDROCHLORIDE 1 MG/ML
0.2 INJECTION, SOLUTION INTRAMUSCULAR; INTRAVENOUS; SUBCUTANEOUS
Status: DISCONTINUED | OUTPATIENT
Start: 2020-11-14 | End: 2020-11-14 | Stop reason: HOSPADM

## 2020-11-14 RX ORDER — KETOROLAC TROMETHAMINE 30 MG/ML
15 INJECTION, SOLUTION INTRAMUSCULAR; INTRAVENOUS
Status: ACTIVE | OUTPATIENT
Start: 2020-11-14 | End: 2020-11-19

## 2020-11-14 RX ORDER — SODIUM CHLORIDE 0.9 % (FLUSH) 0.9 %
5-40 SYRINGE (ML) INJECTION AS NEEDED
Status: DISCONTINUED | OUTPATIENT
Start: 2020-11-14 | End: 2020-11-19 | Stop reason: HOSPADM

## 2020-11-14 RX ORDER — MIDAZOLAM HYDROCHLORIDE 1 MG/ML
0.5 INJECTION, SOLUTION INTRAMUSCULAR; INTRAVENOUS
Status: DISCONTINUED | OUTPATIENT
Start: 2020-11-14 | End: 2020-11-14 | Stop reason: HOSPADM

## 2020-11-14 RX ORDER — SODIUM CHLORIDE 9 MG/ML
25 INJECTION, SOLUTION INTRAVENOUS CONTINUOUS
Status: DISCONTINUED | OUTPATIENT
Start: 2020-11-14 | End: 2020-11-14 | Stop reason: HOSPADM

## 2020-11-14 RX ORDER — SODIUM CHLORIDE, SODIUM LACTATE, POTASSIUM CHLORIDE, CALCIUM CHLORIDE 600; 310; 30; 20 MG/100ML; MG/100ML; MG/100ML; MG/100ML
100 INJECTION, SOLUTION INTRAVENOUS CONTINUOUS
Status: DISCONTINUED | OUTPATIENT
Start: 2020-11-14 | End: 2020-11-14 | Stop reason: HOSPADM

## 2020-11-14 RX ORDER — SODIUM CHLORIDE, SODIUM LACTATE, POTASSIUM CHLORIDE, CALCIUM CHLORIDE 600; 310; 30; 20 MG/100ML; MG/100ML; MG/100ML; MG/100ML
INJECTION, SOLUTION INTRAVENOUS
Status: DISCONTINUED | OUTPATIENT
Start: 2020-11-14 | End: 2020-11-14 | Stop reason: HOSPADM

## 2020-11-14 RX ORDER — SODIUM CHLORIDE 0.9 % (FLUSH) 0.9 %
5-40 SYRINGE (ML) INJECTION EVERY 8 HOURS
Status: DISCONTINUED | OUTPATIENT
Start: 2020-11-14 | End: 2020-11-19 | Stop reason: HOSPADM

## 2020-11-14 RX ORDER — FENTANYL CITRATE 50 UG/ML
50 INJECTION, SOLUTION INTRAMUSCULAR; INTRAVENOUS AS NEEDED
Status: DISCONTINUED | OUTPATIENT
Start: 2020-11-14 | End: 2020-11-14 | Stop reason: HOSPADM

## 2020-11-14 RX ORDER — CEFAZOLIN SODIUM 1 G/3ML
INJECTION, POWDER, FOR SOLUTION INTRAMUSCULAR; INTRAVENOUS AS NEEDED
Status: DISCONTINUED | OUTPATIENT
Start: 2020-11-14 | End: 2020-11-14 | Stop reason: HOSPADM

## 2020-11-14 RX ORDER — ONDANSETRON 2 MG/ML
INJECTION INTRAMUSCULAR; INTRAVENOUS
Status: DISPENSED
Start: 2020-11-14 | End: 2020-11-15

## 2020-11-14 RX ORDER — AMOXICILLIN 250 MG
1 CAPSULE ORAL 2 TIMES DAILY
Status: DISCONTINUED | OUTPATIENT
Start: 2020-11-14 | End: 2020-11-19 | Stop reason: HOSPADM

## 2020-11-14 RX ORDER — HYDROMORPHONE HYDROCHLORIDE 1 MG/ML
0.5 INJECTION, SOLUTION INTRAMUSCULAR; INTRAVENOUS; SUBCUTANEOUS
Status: DISCONTINUED | OUTPATIENT
Start: 2020-11-14 | End: 2020-11-19 | Stop reason: HOSPADM

## 2020-11-14 RX ORDER — FENTANYL CITRATE 50 UG/ML
INJECTION, SOLUTION INTRAMUSCULAR; INTRAVENOUS AS NEEDED
Status: DISCONTINUED | OUTPATIENT
Start: 2020-11-14 | End: 2020-11-14 | Stop reason: HOSPADM

## 2020-11-14 RX ORDER — SODIUM CHLORIDE 9 MG/ML
50 INJECTION, SOLUTION INTRAVENOUS CONTINUOUS
Status: DISCONTINUED | OUTPATIENT
Start: 2020-11-14 | End: 2020-11-19 | Stop reason: HOSPADM

## 2020-11-14 RX ORDER — FENTANYL CITRATE 50 UG/ML
25 INJECTION, SOLUTION INTRAMUSCULAR; INTRAVENOUS
Status: DISCONTINUED | OUTPATIENT
Start: 2020-11-14 | End: 2020-11-14 | Stop reason: HOSPADM

## 2020-11-14 RX ORDER — ROPIVACAINE HYDROCHLORIDE 5 MG/ML
30 INJECTION, SOLUTION EPIDURAL; INFILTRATION; PERINEURAL AS NEEDED
Status: DISCONTINUED | OUTPATIENT
Start: 2020-11-14 | End: 2020-11-14 | Stop reason: HOSPADM

## 2020-11-14 RX ORDER — POLYETHYLENE GLYCOL 3350 17 G/17G
17 POWDER, FOR SOLUTION ORAL DAILY
Status: DISCONTINUED | OUTPATIENT
Start: 2020-11-15 | End: 2020-11-19 | Stop reason: HOSPADM

## 2020-11-14 RX ORDER — SODIUM CHLORIDE 9 MG/ML
125 INJECTION, SOLUTION INTRAVENOUS CONTINUOUS
Status: DISPENSED | OUTPATIENT
Start: 2020-11-14 | End: 2020-11-15

## 2020-11-14 RX ORDER — SODIUM CHLORIDE 0.9 % (FLUSH) 0.9 %
5-40 SYRINGE (ML) INJECTION EVERY 8 HOURS
Status: DISCONTINUED | OUTPATIENT
Start: 2020-11-14 | End: 2020-11-14 | Stop reason: HOSPADM

## 2020-11-14 RX ORDER — ACETAMINOPHEN 325 MG/1
650 TABLET ORAL ONCE
Status: DISCONTINUED | OUTPATIENT
Start: 2020-11-14 | End: 2020-11-14 | Stop reason: HOSPADM

## 2020-11-14 RX ORDER — EPHEDRINE SULFATE/0.9% NACL/PF 50 MG/5 ML
SYRINGE (ML) INTRAVENOUS AS NEEDED
Status: DISCONTINUED | OUTPATIENT
Start: 2020-11-14 | End: 2020-11-14 | Stop reason: HOSPADM

## 2020-11-14 RX ORDER — ENOXAPARIN SODIUM 100 MG/ML
40 INJECTION SUBCUTANEOUS DAILY
Status: DISCONTINUED | OUTPATIENT
Start: 2020-11-15 | End: 2020-11-14

## 2020-11-14 RX ORDER — FACIAL-BODY WIPES
10 EACH TOPICAL DAILY PRN
Status: DISCONTINUED | OUTPATIENT
Start: 2020-11-16 | End: 2020-11-19 | Stop reason: HOSPADM

## 2020-11-14 RX ADMIN — SODIUM CHLORIDE, POTASSIUM CHLORIDE, SODIUM LACTATE AND CALCIUM CHLORIDE: 600; 310; 30; 20 INJECTION, SOLUTION INTRAVENOUS at 09:34

## 2020-11-14 RX ADMIN — CEFAZOLIN 2 G: 330 INJECTION, POWDER, FOR SOLUTION INTRAMUSCULAR; INTRAVENOUS at 09:55

## 2020-11-14 RX ADMIN — FENTANYL CITRATE 25 MCG: 50 INJECTION, SOLUTION INTRAMUSCULAR; INTRAVENOUS at 09:45

## 2020-11-14 RX ADMIN — FENTANYL CITRATE 25 MCG: 50 INJECTION, SOLUTION INTRAMUSCULAR; INTRAVENOUS at 09:56

## 2020-11-14 RX ADMIN — ONDANSETRON HYDROCHLORIDE 4 MG: 2 INJECTION, SOLUTION INTRAMUSCULAR; INTRAVENOUS at 10:38

## 2020-11-14 RX ADMIN — SODIUM CHLORIDE 40 MG: 9 INJECTION, SOLUTION INTRAMUSCULAR; INTRAVENOUS; SUBCUTANEOUS at 13:03

## 2020-11-14 RX ADMIN — Medication 80 MCG: at 09:40

## 2020-11-14 RX ADMIN — SODIUM CHLORIDE 125 ML/HR: 900 INJECTION, SOLUTION INTRAVENOUS at 12:00

## 2020-11-14 RX ADMIN — DOCUSATE SODIUM 50 MG AND SENNOSIDES 8.6 MG 1 TABLET: 8.6; 5 TABLET, FILM COATED ORAL at 17:33

## 2020-11-14 RX ADMIN — DEXMEDETOMIDINE HYDROCHLORIDE 5 MCG: 100 INJECTION, SOLUTION, CONCENTRATE INTRAVENOUS at 09:45

## 2020-11-14 RX ADMIN — Medication 15 MG: at 09:59

## 2020-11-14 RX ADMIN — ONDANSETRON 4 MG: 2 INJECTION INTRAMUSCULAR; INTRAVENOUS at 12:39

## 2020-11-14 RX ADMIN — DEXMEDETOMIDINE HYDROCHLORIDE 5 MCG: 100 INJECTION, SOLUTION, CONCENTRATE INTRAVENOUS at 09:34

## 2020-11-14 NOTE — ROUTINE PROCESS
Patient: Kamari Livingston MRN: [id number]  SSN: [id number] YOB: 1928  Age: 80 y.o. Sex: female Patient is status post Procedure(s): RIGHT HIP PERCUTANEOUS PINNING 7.3mm CANNULATED SCREWS. Surgeon(s) and Role: Gerome Sicard, MD - Primary Peripheral IV 11/14/20 Right Forearm (Active) Site Assessment Clean, dry, & intact 11/14/20 6375 Phlebitis Assessment 0 11/14/20 5666 Infiltration Assessment 0 11/14/20 0653 Dressing Status Clean, dry, & intact 11/14/20 6982 Dressing Type Transparent 11/14/20 6916 Hub Color/Line Status Capped 11/14/20 8435 Action Taken Open ports on tubing capped 11/14/20 9796 Alcohol Cap Used Yes 11/14/20 7614 Dressing/Packing:  Wound Hip Right-Dressing/Treatment: Gauze dressing/dressing sponge;Tegaderm/Transparent film dressing;Xeroform (11/14/20 0900)

## 2020-11-14 NOTE — PERIOP NOTES
TRANSFER - OUT REPORT:    Verbal report given to [facility] on Tani Palomo  being transferred to 18 for routine post - op       Report consisted of patients Situation, Background, Assessment and   Recommendations(SBAR). Time Pre op antibiotic YLZOR:7040  Anesthesia Stop time: 1054  Grant Present on Transfer to floor:yes  Order for Grant on Chart:yes  Discharge Prescriptions with Chart:none    Information from the following report(s) OR Summary, Procedure Summary, Intake/Output, MAR, Accordion, Recent Results, Med Rec Status and Cardiac Rhythm nsr was reviewed with the receiving nurse. Opportunity for questions and clarification was provided. Is the patient on 02? YES       L/Min 2       Other nasal cannula    Is the patient on a monitor? NO    Is the nurse transporting with the patient? NO    Surgical Waiting Area notified of patient's transfer from PACU? YES. Daughter Kelle Sanchez notified.       The following personal items collected during your admission accompanied patient upon transfer:   Dental Appliance: Dental Appliances: None(permanent partials)  Vision: Visual Aid: None  Hearing Aid:    Jewelry:    Clothing:    Other Valuables:    Valuables sent to safe:

## 2020-11-14 NOTE — ANESTHESIA POSTPROCEDURE EVALUATION
Post-Anesthesia Evaluation and Assessment    Patient: Yanely Bergeron MRN: [id number]  SSN: [id number]    YOB: 1928  Age: 80 y.o. Sex: female      I have evaluated the patient and they are stable and ready for discharge from the PACU. Cardiovascular Function/Vital Signs  Visit Vitals  BP (!) 139/58   Pulse 81   Temp 36.7 °C (98 °F)   Resp 26   Ht 5' (1.524 m)   Wt 50.3 kg (111 lb)   SpO2 100%   BMI 21.68 kg/m²       Patient is status post General anesthesia for Procedure(s):  RIGHT HIP PERCUTANEOUS PINNING 7.3mm CANNULATED SCREWS. Nausea/Vomiting: None    Postoperative hydration reviewed and adequate. Pain:  Pain Scale 1: Numeric (0 - 10) (11/14/20 1125)  Pain Intensity 1: 0 (11/14/20 1125)   Managed    Neurological Status:   Neuro (WDL): Within Defined Limits (11/14/20 1125)  Neuro  Neurologic State: Alert (11/14/20 1125)  LUE Motor Response: Purposeful (11/14/20 1125)  LLE Motor Response: Purposeful (11/14/20 1125)  RUE Motor Response: Purposeful (11/14/20 1125)  RLE Motor Response: Purposeful (11/14/20 1125)   At baseline    Mental Status, Level of Consciousness: Alert and  oriented to person, place, and time    Pulmonary Status:   O2 Device: Nasal cannula (11/14/20 1125)   Adequate oxygenation and airway patent    Complications related to anesthesia: None    Post-anesthesia assessment completed. No concerns    Signed By: Barb Felder MD     November 14, 2020              Procedure(s):  RIGHT HIP PERCUTANEOUS PINNING 7.3mm CANNULATED SCREWS. general    <BSHSIANPOST>    INITIAL Post-op Vital signs:   Vitals Value Taken Time   /70 11/14/2020 11:45 AM   Temp 36.7 °C (98 °F) 11/14/2020 10:54 AM   Pulse 80 11/14/2020 11:47 AM   Resp 19 11/14/2020 11:47 AM   SpO2 100 % 11/14/2020 11:47 AM   Vitals shown include unvalidated device data.

## 2020-11-14 NOTE — ED NOTES
Client NAD. Daughter at bedside. L. Hip lifted by pillows. Client daughter asking about tylenol regime. Client voice concern with strong pains meds. Vitals stable.

## 2020-11-14 NOTE — ED NOTES
Client resting, eyes closed. Reports having intermitten spasms. Informed that this is not abnormal. Surgery likely tomorrow and should improve condition. Daughter at bedside. Telemetry intact. AXOX4. NAD.

## 2020-11-14 NOTE — PROGRESS NOTES
Problem: Pressure Injury - Risk of  Goal: *Prevention of pressure injury  Description: Document Ricardo Scale and appropriate interventions in the flowsheet. Outcome: Progressing Towards Goal  Note: Pressure Injury Interventions:  Sensory Interventions: Assess changes in LOC, Check visual cues for pain, Float heels, Keep linens dry and wrinkle-free, Maintain/enhance activity level, Minimize linen layers    Moisture Interventions: Absorbent underpads    Activity Interventions: Increase time out of bed, PT/OT evaluation    Mobility Interventions: HOB 30 degrees or less, PT/OT evaluation    Nutrition Interventions: Offer support with meals,snacks and hydration    Friction and Shear Interventions: HOB 30 degrees or less                Problem: Falls - Risk of  Goal: *Absence of Falls  Description: Document Don Fall Risk and appropriate interventions in the flowsheet.   Outcome: Progressing Towards Goal  Note: Fall Risk Interventions:       Mentation Interventions: Adequate sleep, hydration, pain control, Family/sitter at bedside         Elimination Interventions: Call light in reach, Patient to call for help with toileting needs    History of Falls Interventions: Door open when patient unattended, Utilize gait belt for transfer/ambulation

## 2020-11-14 NOTE — PROGRESS NOTES
Primary Nurse Germaine Carter RN and Venancio Borges RN performed a dual skin assessment on this patient No impairment noted  Ricardo score is 15

## 2020-11-14 NOTE — CONSULTS
Ortho Consult      Patient:  Hilario Rosenberg  YOB: 1928    MRN: [id number]     Acct: [de-identified]    PCP: Jose Resendiz MD    Date of Admission: 11/13/2020    Date of Service: Pt seen/examined on 11/13/2020     Chief Complaint: Right Hip Pain      History Of Present Illness: 80 y.o. female who presents with right hip injury. This was a result of a mechanical fall from standing when she was walking to lock her door Thursday night. The patient had immediate pain in the right leg, and has had limited ability to ambulate since her fall. She was able to crawl to bed Thursday night and reportedly was able to crawl to the recliner in the morning before calling her family for help. The patient localizes her pain to the right groin and describes the pain as sharp. Her pain is worse with weightbearing and movement of the hip and improved with rest. The patient had no other extremity injuries, no head injury, no neck or back injury, and no loss of consciousness. The patient lives independently in her home and her granddaughter lives across the street. Patient ambulation status: independent ambulator. Antiplatelets/Anticoagulation includes: none. Past Medical History:      Past Medical History:   Diagnosis Date    Cancer ([facility] [address]     radiation and lumpectony    GERD (gastroesophageal reflux disease)        Past Surgical History:      Past Surgical History:   Procedure Laterality Date    BREAST SURGERY PROCEDURE UNLISTED      IR KYPHOPLASTY LUMBAR  8/19/2019           Home Medications:   Prior to Admission medications    Medication Sig Start Date End Date Taking? Authorizing Provider   furosemide (LASIX) 20 mg tablet 1 tab po qam prn LE edema 9/24/20   Jose Resendiz MD   famotidine (PEPCID) 40 mg tablet Take 1 Tab by mouth nightly. 2/25/20   Jose Resendiz MD   ibuprofen (ADVIL) 200 mg tablet Take 200 mg by mouth every six (6) hours as needed for Pain.     Provider, Historical          Current Hospital Medications:      Current Facility-Administered Medications:     acetaminophen (TYLENOL) tablet 650 mg, 650 mg, Oral, Q6H PRN, Willam Bennett PA    Current Outpatient Medications:     furosemide (LASIX) 20 mg tablet, 1 tab po qam prn LE edema, Disp: 90 Tab, Rfl: 1    famotidine (PEPCID) 40 mg tablet, Take 1 Tab by mouth nightly., Disp: 90 Tab, Rfl: 3    ibuprofen (ADVIL) 200 mg tablet, Take 200 mg by mouth every six (6) hours as needed for Pain., Disp: , Rfl:        Allergies:  Ciprofloxacin; Codeine;  Hydrocodone; Morphine; and Oxycodone    Social History:      Social History     Socioeconomic History    Marital status:      Spouse name: Not on file    Number of children: Not on file    Years of education: Not on file    Highest education level: Not on file   Occupational History    Not on file   Social Needs    Financial resource strain: Not on file    Food insecurity     Worry: Not on file     Inability: Not on file    Transportation needs     Medical: Not on file     Non-medical: Not on file   Tobacco Use    Smoking status: Former Smoker    Smokeless tobacco: Never Used   Substance and Sexual Activity    Alcohol use: No    Drug use: No    Sexual activity: Not on file   Lifestyle    Physical activity     Days per week: Not on file     Minutes per session: Not on file    Stress: Not on file   Relationships    Social connections     Talks on phone: Not on file     Gets together: Not on file     Attends Sabianism service: Not on file     Active member of club or organization: Not on file     Attends meetings of clubs or organizations: Not on file     Relationship status: Not on file    Intimate partner violence     Fear of current or ex partner: Not on file     Emotionally abused: Not on file     Physically abused: Not on file     Forced sexual activity: Not on file   Other Topics Concern    Not on file   Social History Narrative    Not on file       Family History: Family History   Problem Relation Age of Onset    Hypertension Mother     Heart Disease Mother     Cancer Mother         breast    Cancer Father         prostate        Further Family History is noncontributory to this injury. REVIEW OF SYSTEMS:    Review of Systems - General ROS: negative for - chills, fatigue, fever, malaise or night sweats  Psychological ROS: negative  Ophthalmic ROS: negative   ENT ROS: negative for - headaches or sore throat  Hematological and Lymphatic ROS: negative for - bleeding problems or blood clots  Respiratory ROS: no cough, shortness of breath, or wheezing  Cardiovascular ROS: no chest pain or dyspnea on exertion  Gastrointestinal ROS: negative  Musculoskeletal ROS: See HPI  Neurological ROS: negative for - bowel and bladder control changes, gait disturbance or numbness/tingling    All other systems reviewed and are negative    PHYSICAL EXAM:    Visit Vitals  BP (!) 116/45   Pulse (P) 99   Temp (P) 98.6 °F (37 °C)   Resp (P) 18   Ht 5' (1.524 m)   Wt 56.7 kg (125 lb)   SpO2 98%   BMI 24.41 kg/m²       GENERAL APPEARANCE: Awake and alert. No acute distress, except appropriate to injury. MOOD AND AFFECT: Calm appropriate to situation  GAIT AND STATION: Patient is in bed and unable to ambulate secondary to known injury. REFLEXES: No clonus or Babinski. COORDINATION and BALANCE: Patient is grossly coordinated and unable to ambulate secondary to known injury. LYMPH: No cervical lymphadenopathy noted    Right Upper Extremity:    No obvious pain or deformity to inspection with normal joint range of motion, stability, and muscle strength except noted below in bold. Sensation intact to all dermatomes. Radial pulse palpable. Brisk cap refill in all digits. No Lymphedema. Skin intact except where noted below in bold. Painless passive range of motion at shoulder/elbow/wrist, no tenderness to palpation over clavical/shoulder/humerus/elbow/forearm/distal radius/hand. Left Upper Extremity:    No obvious pain or deformity to inspection with normal joint range of motion, stability, and muscle strength except noted below in bold. Sensation intact to all dermatomes. Radial pulse palpable. Brisk cap refill in all digits. No Lymphedema. Skin intact except where noted below in bold. Painless passive range of motion at shoulder/elbow/wrist, no tenderness to palpation over clavical/shoulder/humerus/elbow/forearm/distal radius/hand. Right Lower Extremity:    No obvious pain or deformity to inspection with normal joint range of motion, stability, and muscle strength except noted below in bold. Sensation intact to all dermatomes. Cap refill brisk in all toes. No Lymphedema. Skin intact except where noted below in bold. Positive log roll, no tenderness to palpation over knee/tibia/medial mal/lateral mal/calcaneus/midfoot/forefoot. Sensation intact to light touch in the superficial peroneal, deep peroneal, tibial, sural, saphenous nerve distributions. Motor function of tibialis anterior, extensor hallucis longus, and gactrocsoleus complex intact. Dorsalis pedis and posterior tibialis pulses are palpable. Left Lower Extremity:    No obvious pain or deformity to inspection with normal joint range of motion, stability, and muscle strength except noted below in bold. Sensation intact to all dermatomes. Cap refill brisk in all toes. No Lymphedema. Skin intact except where noted below in bold. Negative log roll, no tenderness to palpation over knee/tibia/medial mal/lateral mal/calcaneus/midfoot/forefoot. Sensation intact to light touch in the superficial peroneal, deep peroneal, tibial, sural, saphenous nerve distributions. Motor function of tibialis anterior, extensor hallucis longus, and gactrocsoleus complex intact. Dorsalis pedis and posterior tibialis pulses are palpable.        Labs:     Recent Labs     11/13/20  1657   WBC 11.4*   HGB 11.1*   HCT 34.3*    Recent Labs     11/13/20  1657      K 3.4*      CO2 28   BUN 29*     No results for input(s): INR, INREXT in the last 72 hours. The above labs were reviewed by me. Radiology:     XR:   Pelvis: right valgus impacted femoral neck fracture, old healed pubic rami fractures  CT:   Pelvis: right valgus impacted femoral neck fracture, old healed pubic rami fractures    Radiology report reviewed.       ASSESSMENT:  80 y.o. female with right valgus impacted femoral neck fracture    PLAN:    -Plan for CRPP  -NPO  -Medically cleared, per Dr Nieves Hathaway possible echo can be done after surgery but should not delay procedure  -Consent on chart signed by POA  -Ice  -TRINIDAD RLE  -Bed rest  -Rudy  -SCDs, please hold DVT chemoprophylaxis in anticipation of Se Mora MD

## 2020-11-14 NOTE — PROGRESS NOTES
Physical Therapy 11/14/2020    Orders received and chart reviewed up to date. Pt POD 0 closed reduction percutaneous screw fixations of R femoral next fracture . Will follow-up tomorrow for PT evaluation as appropriate. Recommend with nursing patient to complete as able in order to maintain strength, endurance and independence: OOB to chair 3x/day. Thank you.   Mariela Dodd, PT, DPT

## 2020-11-14 NOTE — ANESTHESIA PREPROCEDURE EVALUATION
Relevant Problems   No relevant active problems       Anesthetic History   No history of anesthetic complications            Review of Systems / Medical History  Patient summary reviewed, nursing notes reviewed and pertinent labs reviewed    Pulmonary  Within defined limits                 Neuro/Psych   Within defined limits           Cardiovascular            Dysrhythmias : atrial fibrillation      Exercise tolerance: >4 METS     GI/Hepatic/Renal     GERD           Endo/Other  Within defined limits           Other Findings   Comments: Hip fracture           Physical Exam    Airway  Mallampati: II  TM Distance: 4 - 6 cm  Neck ROM: normal range of motion   Mouth opening: Normal     Cardiovascular    Rhythm: irregular  Rate: normal         Dental    Dentition: Lower partial plate and Upper partial plate     Pulmonary  Breath sounds clear to auscultation               Abdominal  GI exam deferred       Other Findings   Comments: Mild agitation         Anesthetic Plan    ASA: 3  Anesthesia type: general          Induction: Intravenous  Anesthetic plan and risks discussed with: Patient and Son / Daughter

## 2020-11-14 NOTE — PROGRESS NOTES
TRANSFER - IN REPORT:    Verbal report received from Liban Mendoza, [facility] (name) on Chano Zimmer  being received from PACU (unit) for routine post - op      Report consisted of patients Situation, Background, Assessment and   Recommendations(SBAR). Information from the following report(s) SBAR, OR Summary, Procedure Summary and MAR was reviewed with the receiving nurse. Opportunity for questions and clarification was provided. Assessment completed upon patients arrival to unit and care assumed.

## 2020-11-14 NOTE — ROUTINE PROCESS
TRANSFER - OUT REPORT: 
 
Verbal report given to SHITAL Guzman on [facility]  being transferred to  for routine progression of care Report consisted of patients Situation, Background, Assessment and  
Recommendations(SBAR). Information from the following report(s) SBAR, ED Summary, Procedure Summary, Recent Results and Procedure Verification was reviewed with the receiving nurse. Lines:  
Peripheral IV 11/13/20 Right Antecubital (Active) Opportunity for questions and clarification was provided. Patient transported with: 
 [employer]

## 2020-11-14 NOTE — CONSULTS
ORTHO CONSULT NOTE    Date of Consultation:  November 13, 2020  Referring Physician: Tara Shearer  CC: Right hip pain    HPI:  Magdy Rosenbaum is a 80 y.o. female PMH GERD and lumbar kyphoplasty who c/o right groin pain status post GLF last night. She was checking to make sure her front door was locked around midnight when she stumbled and fell onto her right side resulting in right hip pain. She was able to get herself to bed last night and get to a recliner this morning when she then called her family for help. They took her to [facility] where x-rays were concerning for right femoral neck fracture. EMS then brought her to the hospital.  Orthopedics was consulted. She denies an open hip wound and denies foot numbness. She denies blood thinners at home. At baseline, she ambulates without a device and lives independently. Her granddaughter lives across the street. She denies previous orthopedic relationship and the family prefers [facility]. Past Medical History:   Diagnosis Date    Cancer [facility])     radiation and lumpectony    GERD (gastroesophageal reflux disease)       Past Surgical History:   Procedure Laterality Date    BREAST SURGERY PROCEDURE UNLISTED      IR KYPHOPLASTY LUMBAR  8/19/2019      Family History   Problem Relation Age of Onset    Hypertension Mother     Heart Disease Mother     Cancer Mother         breast    Cancer Father         prostate      Social History     Tobacco Use    Smoking status: Former Smoker    Smokeless tobacco: Never Used   Substance Use Topics    Alcohol use: No     Allergies   Allergen Reactions    Ciprofloxacin Other (comments)     Bad yeast inf    Codeine Nausea and Vomiting    Hydrocodone Other (comments)     \"burning the esophagus to the point patient can't swallow per family and patient\"    Morphine Nausea Only    Oxycodone Other (comments)     \"burning of esophagus\"        Review of Systems:  Per HPI.     Objective:     Patient Vitals for the past 8 hrs:   BP Temp Pulse Resp SpO2 Height Weight   20 1655      5' (1.524 m) 56.7 kg (125 lb)   20 1651 (!) (P) 163/70 (P) 98.6 °F (37 °C) (P) 99 (P) 18 (P) 97 % (P) 5' 3\" (1.6 m)      Temp (24hrs), Av.6 °F (37 °C), Min:98.6 °F (37 °C), Max:98.6 °F (37 °C)      EXAM:   NAD. Lying supine in stretcher in the ER. Initially the granddaughter is present and the daughter also arrives. She moves BUE spontaneously and has NTTP to long bones and joints. She moves the LLE spontaneously and has NTTP to long bones and joints. Her foot is SILT. There is no deformity for the RLE. The skin over the right hip is intact. She moves the right foot well with SILT and palpable DP. Imaging Review:   Right hip xray: Impaction fracture the right femoral neck. Age-indeterminate fractures of the right pubic rami. Labs:   WBC 11.1, Hgb 11.4, . Impression:     Patient Active Problem List    Diagnosis Date Noted    Senile purpura ([facility] [address] 2020    GERD (gastroesophageal reflux disease)      Right hip femoral neck fracture. Appears impacted on CT. Plan:   I explained the nature of the injury and discussed the recommended surgery. Discussed potential risks/benefits/alternatives of surgery and blood transfusions. Patient consents to both. Plan for right hip percutaneous screws vs enriqueta-arthroplasty. Surgeon will review CT to determine procedure. Daughter is mPOA and available to sign consent. Posted with OR for am.  Medical clearance pending. Bedrest.  NPO p MN. Ice. SCDs OK. Hold heparin. Dr. Ever Mccarty is aware and agrees with above plan. Addendum:  Dr. Ever Mccarty reviewed imaging and recs right hip percutaneous screws. OR aware. Will have daughter sign consent.       QUINTIN Gerber  Orthopedic Trauma Service  [address] [place] [address]

## 2020-11-14 NOTE — H&P
History and Physical    Subjective:     Luis E Harris is a 80 y.o.  female who presents with right hip fracture. She was walking back from locking her door last night when she thinks she caught the toe of her shoe in the carpet and fell on to right side. She crawled to bed and slept last night. Called family this am.  Xray at [facility] showed hip fracture. She has a history of lumbar compression fracture and pelvic fracture. She does not tolerate oxycodone or morphine. .   Past Medical History:   Diagnosis Date    Cancer ([facility] [address]     radiation and lumpectony    GERD (gastroesophageal reflux disease)      Allergies   Allergen Reactions    Ciprofloxacin Other (comments)     Bad yeast inf    Codeine Nausea and Vomiting    Hydrocodone Other (comments)     \"burning the esophagus to the point patient can't swallow per family and patient\"    Morphine Nausea Only    Oxycodone Other (comments)     \"burning of esophagus\"     Prior to Admission medications    Medication Sig Start Date End Date Taking? Authorizing Provider   furosemide (LASIX) 20 mg tablet 1 tab po qam prn LE edema 9/24/20   Chidi Galdamez MD   famotidine (PEPCID) 40 mg tablet Take 1 Tab by mouth nightly. 2/25/20   Chidi Galdamez MD   ibuprofen (ADVIL) 200 mg tablet Take 200 mg by mouth every six (6) hours as needed for Pain. Provider, Historical     Social History     Tobacco Use    Smoking status: Former Smoker    Smokeless tobacco: Never Used   Substance Use Topics    Alcohol use: No     Family History   Problem Relation Age of Onset    Hypertension Mother     Heart Disease Mother     Cancer Mother         breast    Cancer Father         prostate               Review of Systems:  As above. Objective:       Physical Exam: wdwn 81 yo wf  In mild pain from hip fracture  HEENT -- Pupils round. O/P dry mucosa  Neck -- Supple. No JVD. Heart -- irreg irreg  Lungs -- CTA. Abdomen -- Soft. Non-tender. Non-distended.  No masses. Bowel sounds present. Extremities -- No edema. Right leg sl shortened . Good pedal pulses        Data Review:   Recent Results (from the past 24 hour(s))   CBC WITH AUTOMATED DIFF    Collection Time: 11/13/20  4:57 PM   Result Value Ref Range    WBC 11.4 (H) 3.6 - 11.0 K/uL    RBC 3.98 3.80 - 5.20 M/uL    HGB 11.1 (L) 11.5 - 16.0 g/dL    HCT 34.3 (L) 35.0 - 47.0 %    MCV 86.2 80.0 - 99.0 FL    MCH 27.9 26.0 - 34.0 PG    MCHC 32.4 30.0 - 36.5 g/dL    RDW 15.3 (H) 11.5 - 14.5 %    PLATELET 872 374 - 034 K/uL    MPV 10.6 8.9 - 12.9 FL    NRBC 0.0 0  WBC    ABSOLUTE NRBC 0.00 0.00 - 0.01 K/uL    NEUTROPHILS 86 (H) 32 - 75 %    LYMPHOCYTES 2 (L) 12 - 49 %    MONOCYTES 12 5 - 13 %    EOSINOPHILS 0 0 - 7 %    BASOPHILS 0 0 - 1 %    IMMATURE GRANULOCYTES 0 0.0 - 0.5 %    ABS. NEUTROPHILS 9.8 (H) 1.8 - 8.0 K/UL    ABS. LYMPHOCYTES 0.2 (L) 0.8 - 3.5 K/UL    ABS. MONOCYTES 1.4 (H) 0.0 - 1.0 K/UL    ABS. EOSINOPHILS 0.0 0.0 - 0.4 K/UL    ABS. BASOPHILS 0.0 0.0 - 0.1 K/UL    ABS. IMM. GRANS. 0.0 0.00 - 0.04 K/UL    DF SMEAR SCANNED      RBC COMMENTS NORMOCYTIC, NORMOCHROMIC     METABOLIC PANEL, COMPREHENSIVE    Collection Time: 11/13/20  4:57 PM   Result Value Ref Range    Sodium 136 136 - 145 mmol/L    Potassium 3.4 (L) 3.5 - 5.1 mmol/L    Chloride 100 97 - 108 mmol/L    CO2 28 21 - 32 mmol/L    Anion gap 8 5 - 15 mmol/L    Glucose 116 (H) 65 - 100 mg/dL    BUN 29 (H) 6 - 20 MG/DL    Creatinine 0.99 0.55 - 1.02 MG/DL    BUN/Creatinine ratio 29 (H) 12 - 20      GFR est AA >60 >60 ml/min/1.73m2    GFR est non-AA 52 (L) >60 ml/min/1.73m2    Calcium 9.0 8.5 - 10.1 MG/DL    Bilirubin, total 0.7 0.2 - 1.0 MG/DL    ALT (SGPT) 28 12 - 78 U/L    AST (SGOT) 22 15 - 37 U/L    Alk.  phosphatase 91 45 - 117 U/L    Protein, total 7.6 6.4 - 8.2 g/dL    Albumin 4.0 3.5 - 5.0 g/dL    Globulin 3.6 2.0 - 4.0 g/dL    A-G Ratio 1.1 1.1 - 2.2     SAMPLES BEING HELD    Collection Time: 11/13/20  4:57 PM   Result Value Ref Range SAMPLES BEING HELD 1 RED, 1 BLUE     COMMENT        Add-on orders for these samples will be processed based on acceptable specimen integrity and analyte stability, which may vary by analyte. EKG, 12 LEAD, INITIAL    Collection Time: 11/13/20  5:14 PM   Result Value Ref Range    Ventricular Rate 104 BPM    Atrial Rate 104 BPM    QRS Duration 78 ms    Q-T Interval 438 ms    QTC Calculation (Bezet) 575 ms    Calculated R Axis 70 degrees    Calculated T Axis 83 degrees    Diagnosis       Atrial fibrillation  ST & T wave abnormality, consider inferior ischemia  No previous ECGs available           Assessment:     Active Problems:    Closed right hip fracture, initial encounter ([facility] [address] (11/13/2020)      Paroxysmal atrial fibrillation ([facility] [address] (11/13/2020)        Plan:     Right hip fracture- per ortho for ? orif  A fib- echo - can be done after surgery . Rate controlled.   Discussed code status with patient and daughter and she would like to be resuscitated    Signed By: Bridget Sánchez MD     November 13, 2020

## 2020-11-14 NOTE — PROGRESS NOTES
Patient's family requesting SCD's be removed so patient can sleep. Family is power of  and patient is only oriented to self. This RN explained the importance of SCDs in helping the prevention of blood clots to family member. Family member educated by this RN that consequences of a blood clot can be fatal. Family member endorses that she understands this and would like to remove SCDs. Will, PA notified of family's decision.

## 2020-11-14 NOTE — PROGRESS NOTES
TRANSFER - OUT REPORT:    Verbal report given on Lauren Adams  being transferred to PACU (unit) for ordered procedure       Report consisted of patients Situation, Background, Assessment and   Recommendations(SBAR). Information from the following report(s) SBAR was reviewed with the receiving nurse. Lines:   Peripheral IV 11/14/20 Right Forearm (Active)   Site Assessment Clean, dry, & intact 11/14/20 1046   Phlebitis Assessment 0 11/14/20 1046   Infiltration Assessment 0 11/14/20 1046   Dressing Status Clean, dry, & intact; Occlusive 11/14/20 1046   Dressing Type Tape;Transparent 11/14/20 1046   Hub Color/Line Status Yellow; Flushed;Capped 11/14/20 1046   Action Taken Open ports on tubing capped 11/14/20 0653   Alcohol Cap Used Yes 11/14/20 1046       Peripheral IV 11/14/20 Left;Posterior Forearm (Active)   Site Assessment Clean, dry, & intact 11/14/20 1044   Phlebitis Assessment 0 11/14/20 1044   Dressing Status Clean, dry, & intact 11/14/20 1044   Dressing Type Tape;Transparent 11/14/20 1044   Hub Color/Line Status Pink; Infusing 11/14/20 1044   Alcohol Cap Used Yes 11/14/20 1044        Opportunity for questions and clarification was provided.

## 2020-11-14 NOTE — PROGRESS NOTES
Lovenox Monitoring  Indication: DVT Prophylaxis  Recent Labs     11/14/20  0529 11/13/20  1657   HGB 11.1* 11.1*    206   CREA  --  0.99     Current Weight: 50 kg  Est. CrCl = 26 ml/min  Current Dose: 40 mg subcutaneously every 24 hours. Plan: Change to 30 mg Q24H for CrCl < 30 mL/min .

## 2020-11-14 NOTE — PROGRESS NOTES
Bedside and Verbal shift change report given to Brooke Bass RN (oncoming nurse) by Nicol Mcelroy RN (offgoing nurse). Report included the following information SBAR.

## 2020-11-14 NOTE — ED NOTES
Client daughter at bedside, massaging legs of client. Vitals stable. NAD. AXOX4. Breathing even unlabored. Awaiting transport.

## 2020-11-14 NOTE — OP NOTES
Operative Report      Patient Name:  Collin Saldana   YOB: 1928   MRN:  [id number]   Date of Surgery: 11/14/20        Pre-operative diagnosis:  Right femoral neck fracture    Post-operative diagnosis:  Same    Procedure(s): Closed reduction percutaneous screw fixation of right femoral neck fracture (CPT 02352)    Surgeon: Gianna Isidro M.D. Assistant(s):  None    Anesthesia: General with LMA    EBL:  <50cc    IVF: Crystalloid    Medications: Two grams of Cefazolin were given. Implants: Synthes 7.3mm cannulated screws x3    Clinical History/Indication for Surgery  The patient is a 80y.o. year old female who was presents for operative fixation of a right valgus impacted femoral neck fracture. Typical indications for surgery were reviewed and percutaneous screw fixation was recommended. Risks of surgery in general were reviewed including, but not limited to, infection, nonunion, need for additional procedures,  failure of fixation which could require revision, damage to normal structures as well as medical complications such as MI, stroke, PE, DVT, and even death. Patient and any family present were given opportunity to ask questions and consider her options ultimately electing to proceed with surgery. No guarantees were given or implied. Operative Narration  The patient was identified in the pre-operative holding area. The surgical site was identified and marked. Informed consent was obtained. The patient was then brought to the operating room and placed supine on the operating table. Anesthesia was administered and care of the head, neck, and airway was maintained by the anesthesia staff throughout the entire procedure. Patient was placed onto the fracture table. A well padded perineal post was placed. Operative leg was placed in traction and the well leg padded and placed in the well leg francois and abducted. Preop fluoroscopy confirmed a successful closed reduction.   All bony prominences were identified and padded. The operative leg was prepped and draped in the usual sterile fashion. A surgical timeout was performed. Antibiotics were confirmed to have been given. The first guide pin was placed percutaneously starting above the level of the lesser trochanter and positioned low on the AP and centered on the lateral.  Once correctly positioned the skin incision was made to allow placement of the second and third pins. One postero-superior and one corey-superior to the initial pin. Once correctly positioned, each pin was measured, the cortex drilled and each screw placed initially by power and finished on hand under fluoro guidance. Final films were obtained and saved. Care was taken to confirm no screws penetrated the articular surface of the femoral head. Incision was irrigated and closed in a layered fashion. Sterile dressings was applied. Once the patient was awakened from anesthesia, they were transported to the PACU in stable condition, having tolerated surgery well with no immediate complications.     Postoperative Plan  WBAT  Abx x 24hrs  DVT prophylaxis  Follow up 2wks with Dr Dru Eckert      This operative report was prepared and signed by Flaco Villalpando MD   at 11/14/20, 10:58 AM

## 2020-11-15 LAB
ANION GAP SERPL CALC-SCNC: 7 MMOL/L (ref 5–15)
BUN SERPL-MCNC: 15 MG/DL (ref 6–20)
BUN/CREAT SERPL: 19 (ref 12–20)
CALCIUM SERPL-MCNC: 7.9 MG/DL (ref 8.5–10.1)
CHLORIDE SERPL-SCNC: 106 MMOL/L (ref 97–108)
CO2 SERPL-SCNC: 23 MMOL/L (ref 21–32)
CREAT SERPL-MCNC: 0.8 MG/DL (ref 0.55–1.02)
GLUCOSE SERPL-MCNC: 96 MG/DL (ref 65–100)
HGB BLD-MCNC: 9.1 G/DL (ref 11.5–16)
POTASSIUM SERPL-SCNC: 4.1 MMOL/L (ref 3.5–5.1)
SODIUM SERPL-SCNC: 136 MMOL/L (ref 136–145)

## 2020-11-15 PROCEDURE — 80048 BASIC METABOLIC PNL TOTAL CA: CPT

## 2020-11-15 PROCEDURE — 36415 COLL VENOUS BLD VENIPUNCTURE: CPT

## 2020-11-15 PROCEDURE — 97535 SELF CARE MNGMENT TRAINING: CPT

## 2020-11-15 PROCEDURE — 97530 THERAPEUTIC ACTIVITIES: CPT

## 2020-11-15 PROCEDURE — 74011250636 HC RX REV CODE- 250/636: Performed by: INTERNAL MEDICINE

## 2020-11-15 PROCEDURE — 94760 N-INVAS EAR/PLS OXIMETRY 1: CPT

## 2020-11-15 PROCEDURE — 97161 PT EVAL LOW COMPLEX 20 MIN: CPT

## 2020-11-15 PROCEDURE — 74011250637 HC RX REV CODE- 250/637: Performed by: ORTHOPAEDIC SURGERY

## 2020-11-15 PROCEDURE — 74011250636 HC RX REV CODE- 250/636: Performed by: FAMILY MEDICINE

## 2020-11-15 PROCEDURE — 65270000029 HC RM PRIVATE

## 2020-11-15 PROCEDURE — 97116 GAIT TRAINING THERAPY: CPT

## 2020-11-15 PROCEDURE — 97165 OT EVAL LOW COMPLEX 30 MIN: CPT

## 2020-11-15 PROCEDURE — 85018 HEMOGLOBIN: CPT

## 2020-11-15 PROCEDURE — 97110 THERAPEUTIC EXERCISES: CPT

## 2020-11-15 PROCEDURE — C9113 INJ PANTOPRAZOLE SODIUM, VIA: HCPCS | Performed by: INTERNAL MEDICINE

## 2020-11-15 PROCEDURE — 74011000250 HC RX REV CODE- 250: Performed by: INTERNAL MEDICINE

## 2020-11-15 RX ORDER — FAMOTIDINE 20 MG/1
20 TABLET, FILM COATED ORAL DAILY
Status: DISCONTINUED | OUTPATIENT
Start: 2020-11-16 | End: 2020-11-16

## 2020-11-15 RX ADMIN — ACETAMINOPHEN 650 MG: 325 TABLET ORAL at 08:40

## 2020-11-15 RX ADMIN — DOCUSATE SODIUM 50 MG AND SENNOSIDES 8.6 MG 1 TABLET: 8.6; 5 TABLET, FILM COATED ORAL at 09:10

## 2020-11-15 RX ADMIN — SODIUM CHLORIDE 50 ML/HR: 900 INJECTION, SOLUTION INTRAVENOUS at 11:37

## 2020-11-15 RX ADMIN — Medication 1 TABLET: at 09:19

## 2020-11-15 RX ADMIN — SODIUM CHLORIDE 40 MG: 9 INJECTION, SOLUTION INTRAMUSCULAR; INTRAVENOUS; SUBCUTANEOUS at 09:10

## 2020-11-15 RX ADMIN — ENOXAPARIN SODIUM 30 MG: 30 INJECTION SUBCUTANEOUS at 09:19

## 2020-11-15 NOTE — PROGRESS NOTES
Problem: Pressure Injury - Risk of  Goal: *Prevention of pressure injury  Description: Document Ricardo Scale and appropriate interventions in the flowsheet. Outcome: Progressing Towards Goal  Note: Pressure Injury Interventions:  Sensory Interventions: Assess changes in LOC, Check visual cues for pain, Keep linens dry and wrinkle-free, Maintain/enhance activity level, Minimize linen layers, Monitor skin under medical devices    Moisture Interventions: Absorbent underpads    Activity Interventions: Increase time out of bed, PT/OT evaluation    Mobility Interventions: Float heels, HOB 30 degrees or less, PT/OT evaluation    Nutrition Interventions: Offer support with meals,snacks and hydration    Friction and Shear Interventions: HOB 30 degrees or less                Problem: Falls - Risk of  Goal: *Absence of Falls  Description: Document Don Fall Risk and appropriate interventions in the flowsheet. Outcome: Progressing Towards Goal  Note: Fall Risk Interventions:       Mentation Interventions: Adequate sleep, hydration, pain control, Family/sitter at bedside         Elimination Interventions: Call light in reach, Patient to call for help with toileting needs    History of Falls Interventions:  Investigate reason for fall, Utilize gait belt for transfer/ambulation

## 2020-11-15 NOTE — PROGRESS NOTES
Problem: Mobility Impaired (Adult and Pediatric)  Goal: *Acute Goals and Plan of Care (Insert Text)  Description: FUNCTIONAL STATUS PRIOR TO ADMISSION: Patient was independent and active without use of DME.    HOME SUPPORT PRIOR TO ADMISSION: The patient lived alone with family across the street to provide assistance. Physical Therapy Goals  Initiated 11/15/2020  1. Patient will move from supine to sit and sit to supine  and roll side to side in bed with supervision/set-up within 7 day(s). 2.  Patient will transfer from bed to chair and chair to bed with supervision/set-up using the least restrictive device within 7 day(s). 3.  Patient will perform sit to stand with supervision/set-up within 7 day(s). 4.  Patient will ambulate with supervision/set-up for 50 feet with the least restrictive device within 7 day(s). 5.  Patient will ascend/descend 2 stairs with  handrail(s) with minimal assistance/contact guard assist within 7 day(s). 11/15/2020 1253 by Clarisa Jones, PT  Outcome: Progressing Towards Goal   PHYSICAL THERAPY TREATMENT  Patient: Malika Celaya (13 y.o. female)  Date: 11/15/2020  Diagnosis: Hip fracture ([facility] [S72.009A]   <principal problem not specified>  Procedure(s) (LRB):  RIGHT HIP PERCUTANEOUS PINNING 7.3mm CANNULATED SCREWS (Right) 1 Day Post-Op  Precautions:    Chart, physical therapy assessment, plan of care and goals were reviewed. ASSESSMENT  Patient continues with skilled PT services and is progressing towards goals. Patient received in bed and patient and family state only back to bed `20 minutes having walked to the bathroom and sitting up for lunch. Able to instruct on more exercises and walk in room.   Very supportive family and feel will be able to manage at home with family assist..     Current Level of Function Impacting Discharge (mobility/balance): minimal assist overall and increased time     Other factors to consider for discharge:          PLAN :  Patient continues to benefit from skilled intervention to address the above impairments. Continue treatment per established plan of care. to address goals. Recommendation for discharge: (in order for the patient to meet his/her long term goals)  Physical therapy at least 2 days/week in the home AND ensure assist and/or supervision for safety with all mobility    This discharge recommendation:  Has not yet been discussed the attending provider and/or case management    IF patient discharges home will need the following DME: none       SUBJECTIVE:   Patient stated I will do my best.    OBJECTIVE DATA SUMMARY:   Critical Behavior:  Neurologic State: Alert, Confused  Orientation Level: Oriented X4  Cognition: Follows commands     Functional Mobility Training:  Bed Mobility:     Supine to Sit: Additional time; Moderate assistance     Scooting: Minimum assistance; Additional time        Transfers:  Sit to Stand: Minimum assistance  Stand to Sit: Minimum assistance        Bed to Chair: Minimum assistance;Assist x1                    Balance:  Sitting: Intact  Standing: Impaired; With support  Standing - Static: Good  Standing - Dynamic : Fair  Ambulation/Gait Training:  Distance (ft): 30 Feet (ft)  Assistive Device: Gait belt;Walker, rolling  Ambulation - Level of Assistance: Minimal assistance     Gait Description (WDL): Exceptions to WDL  Gait Abnormalities: Antalgic;Decreased step clearance  Right Side Weight Bearing: As tolerated  Left Side Weight Bearing: Full  Base of Support: Widened  Stance: Right decreased  Speed/Shweta: Pace decreased (<100 feet/min)  Step Length: Right shortened;Left shortened  Swing Pattern: Left asymmetrical;Right asymmetrical        Therapeutic Exercises:    Active assist heel slides; abduction/adduction, short arc quads -all 10 reps  Activity Tolerance:   SpO2 stable on RA    After treatment patient left in no apparent distress:   Supine in bed, Call bell within reach, Caregiver / family present, and Side rails x 3    COMMUNICATION/COLLABORATION:   The patients plan of care was discussed with: Registered nurse.      Petra Betancourt, PT   Time Calculation: 45 mins

## 2020-11-15 NOTE — PROGRESS NOTES
Problem: Mobility Impaired (Adult and Pediatric)  Goal: *Acute Goals and Plan of Care (Insert Text)  Description: FUNCTIONAL STATUS PRIOR TO ADMISSION: Patient was independent and active without use of DME.    HOME SUPPORT PRIOR TO ADMISSION: The patient lived alone with family across the street to provide assistance. Physical Therapy Goals  Initiated 11/15/2020  1. Patient will move from supine to sit and sit to supine  and roll side to side in bed with supervision/set-up within 7 day(s). 2.  Patient will transfer from bed to chair and chair to bed with supervision/set-up using the least restrictive device within 7 day(s). 3.  Patient will perform sit to stand with supervision/set-up within 7 day(s). 4.  Patient will ambulate with supervision/set-up for 50 feet with the least restrictive device within 7 day(s). 5.  Patient will ascend/descend 2 stairs with  handrail(s) with minimal assistance/contact guard assist within 7 day(s). Outcome: Progressing Towards Goal   PHYSICAL THERAPY EVALUATION  Patient: Shannon Salinas (40 y.o. female)  Date: 11/15/2020  Primary Diagnosis: [facility] [S72.009A]  Procedure(s) (LRB):  RIGHT HIP PERCUTANEOUS PINNING 7.3mm CANNULATED SCREWS (Right) 1 Day Post-Op   Precautions: WBAT RLE; fall         ASSESSMENT  Based on the objective data described below, the patient presents with significantly impaired mobility following fall at home and fracture of Right hip and post op day one of repair. Daughter and granddaughter present and strongly involved in patient care. Patient is quite pleasant and cooperative but limited by hearing, early dementia and pain. Requiring assist with all bed mobility, transfers and gait. Family indicates strong preference in patient discharging home with family to assist.  Patient is quite motivated to return to independent functional level as well. .    Current Level of Function Impacting Discharge (mobility/balance): moderate assist ; limited gait    Functional Outcome Measure: The patient scored Total: 45/100 on the Barthel Index which is indicative of 55% impaired ability to care for basic self needs/dependency on others. Other factors to consider for discharge: supportive family     Patient will benefit from skilled therapy intervention to address the above noted impairments. PLAN :  Recommendations and Planned Interventions: bed mobility training, transfer training, gait training, therapeutic exercises, patient and family training/education, and therapeutic activities      Frequency/Duration: Patient will be followed by physical therapy:  twice daily x 2 days and then daily to address goals. Recommendation for discharge: (in order for the patient to meet his/her long term goals)  Physical therapy at least 2 days/week in the home AND ensure assist and/or supervision for safety with mobility; if unable then inpatient rehab to maximize safety and independence; feel she could tolerate 3hours /day     This discharge recommendation:  Has not yet been discussed the attending provider and/or case management    IF patient discharges home will need the following DME: none         SUBJECTIVE:   Patient stated I want to be independent.     OBJECTIVE DATA SUMMARY:   HISTORY:    Past Medical History:   Diagnosis Date    Cancer ([facility]     radiation and lumpectony    GERD (gastroesophageal reflux disease)      Past Surgical History:   Procedure Laterality Date    BREAST SURGERY PROCEDURE UNLISTED      IR KYPHOPLASTY LUMBAR  8/19/2019       Personal factors and/or comorbidities impacting plan of care:     Home Situation  Home Environment: Private residence  # Steps to Enter: 2  One/Two Story Residence: One story  Living Alone: Yes  Support Systems: Family member(s)(granddaughter lives across street)  Current DME Used/Available at Home: Ivan Donald, rollator, Rockwell Elmer, straight, Grab bars  Tub or Shower Type: Tub/Shower combination    EXAMINATION/PRESENTATION/DECISION MAKING:   Critical Behavior:  Neurologic State: Alert, Confused  Orientation Level: Oriented X4  Cognition: Follows commands     Hearing: Auditory  Auditory Impairment: Hard of hearing, bilateral  Skin:  see nursing notes  Edema:   Range Of Motion:  AROM: Generally decreased, functional(except RLE)                       Strength:    Strength: Generally decreased, functional                    Tone & Sensation:   Tone: Normal                              Coordination:  Coordination: Generally decreased, functional  Vision:   Acuity: Impaired near vision  Functional Mobility:  Bed Mobility:     Supine to Sit: Moderate assistance;Assist x1;Additional time     Scooting: Moderate assistance;Assist x1  Transfers:  Sit to Stand: Minimum assistance; Adaptive equipment;Assist x1  Stand to Sit: Minimum assistance;Assist x1        Bed to Chair: Minimum assistance;Assist x1              Balance:   Sitting: Intact  Standing: Impaired  Standing - Static: Fair;Constant support  Standing - Dynamic : Fair;Constant support  Ambulation/Gait Training:  Distance (ft): 10 Feet (ft)  Assistive Device: Gait belt;Walker, rolling  Ambulation - Level of Assistance: Minimal assistance     Gait Description (WDL): Exceptions to WDL  Gait Abnormalities: Antalgic;Decreased step clearance;Shuffling gait; Step to gait  Right Side Weight Bearing: As tolerated  Left Side Weight Bearing: Full  Base of Support: Widened  Stance: Right decreased  Speed/Shweta: Pace decreased (<100 feet/min); Delayed  Step Length: Left shortened;Right shortened  Swing Pattern: Left asymmetrical;Right asymmetrical            Therapeutic Exercises:    Ankle pumps, quad sets, knee extension    Functional Measure:  Barthel Index:    Bathin  Bladder: 0  Bowels: 10  Groomin  Dressin  Feeding: 10  Mobility: 5  Stairs: 0  Toilet Use: 5  Transfer (Bed to Chair and Back): 10  Total: 45/100       The Barthel ADL Index: Guidelines  1. The index should be used as a record of what a patient does, not as a record of what a patient could do. 2. The main aim is to establish degree of independence from any help, physical or verbal, however minor and for whatever reason. 3. The need for supervision renders the patient not independent. 4. A patient's performance should be established using the best available evidence. Asking the patient, friends/relatives and nurses are the usual sources, but direct observation and common sense are also important. However direct testing is not needed. 5. Usually the patient's performance over the preceding 24-48 hours is important, but occasionally longer periods will be relevant. 6. Middle categories imply that the patient supplies over 50 per cent of the effort. 7. Use of aids to be independent is allowed. Cuco Paulson., Barthel, D.W. (6379). Functional evaluation: the Barthel Index. [facility] (14)2. IVAN Alonso, Mimi Coleman., Georgia Kaur., [place], [address] [place] [address] (1999). Measuring the change indisability after inpatient rehabilitation; comparison of the responsiveness of the Barthel Index and Functional [place] Measure. Journal of Neurology, Neurosurgery, and Psychiatry, 66(4), 966-586. Deidre Petersen, N.J.A, KRYSTLE Karimi, & Kimi Palacio MDAVID. (2004.) Assessment of post-stroke quality of life in cost-effectiveness studies: The usefulness of the Barthel Index and the EuroQoL-5D.  Quality of Life Research, 15, 932-98        Physical Therapy Evaluation Charge Determination   History Examination Presentation Decision-Making   LOW Complexity : Zero comorbidities / personal factors that will impact the outcome / POC MEDIUM Complexity : 3 Standardized tests and measures addressing body structure, function, activity limitation and / or participation in recreation  LOW Complexity : Stable, uncomplicated  Other outcome measures Barthel  HIGH       Based on the above components, the patient evaluation is determined to be of the following complexity level: LOW     Activity Tolerance:   Fair    After treatment patient left in no apparent distress:   Sitting in chair, Call bell within reach, and Caregiver / family present    COMMUNICATION/EDUCATION:   The patients plan of care was discussed with: Occupational therapist and Registered nurse. Fall prevention education was provided and the patient/caregiver indicated understanding., Patient/family have participated as able in goal setting and plan of care. , and Patient/family agree to work toward stated goals and plan of care.     Thank you for this referral.  Gwendolyn Arevalo, PT   Time Calculation: 38 mins

## 2020-11-15 NOTE — PROGRESS NOTES
Medical Progress Note      NAME: Terry Carvajal   :  3/7/1928  MRM:  [id number]    Date/Time: 11/15/2020  9:07 AM         Problem List:     Active Problems:    Closed right hip fracture, initial encounter ([facility] (2020)      Paroxysmal atrial fibrillation ([facility] (2020)      Hip fracture ([facility] (2020)             Subjective:     Patient feeling ok. Asks about pepcid. Eating breakfast with granddaughter at bedside. Past Medical History:   Diagnosis Date    Cancer ([facility]     radiation and lumpectony    GERD (gastroesophageal reflux disease)        ROS:  General: negative for fever, chills, sweats, weakness  Respiratory:  negative for cough, sputum production, SOB, wheezing, ARTIS, pleuritic pain  Cardiology:  negative for chest pain, palpitations, orthopnea, PND, edema, syncope   Gastrointestinal: negative for abdominal pain, N/V, dysphagia, change in bowel habits, bleeding  Muskuloskeletal : negative for arthralgia, myalgia         Objective:       Vitals:          Last 24hrs VS reviewed since prior progress note. Most recent are:    Visit Vitals  /68 (BP 1 Location: Right arm, BP Patient Position: At rest)   Pulse 95   Temp 98.4 °F (36.9 °C)   Resp 16   Ht 5' (1.524 m)   Wt 111 lb (50.3 kg)   SpO2 95%   BMI 21.68 kg/m²     SpO2 Readings from Last 6 Encounters:   11/15/20 95%   19 98%    O2 Flow Rate (L/min): 2 l/min       Intake/Output Summary (Last 24 hours) at 11/15/2020 9587  Last data filed at 2020 1046  Gross per 24 hour   Intake 450 ml   Output    Net 450 ml          Exam:     General   Frail 79 yo wf  Respiratory   Clear To Auscultation bilaterally - no wheezes, rales, rhonchi, or crackles  Cardiology  irreg  Abdominal  Soft, non-tender, non-distended, positive bowel sounds, no hepatosplenomegaly  Extremities  No clubbing, cyanosis, or edema. Pulses intact.     Lab Data Reviewed: (see below)    Medications Reviewed: (see below)    ______________________________________________________________________    Medications:     Current Facility-Administered Medications   Medication Dose Route Frequency    [START ON 11/16/2020] famotidine (PEPCID) tablet 20 mg  20 mg Oral DAILY    0.9% sodium chloride infusion  50 mL/hr IntraVENous CONTINUOUS    sodium chloride (NS) flush 5-40 mL  5-40 mL IntraVENous Q8H    sodium chloride (NS) flush 5-40 mL  5-40 mL IntraVENous PRN    naloxone (NARCAN) injection 0.4 mg  0.4 mg IntraVENous PRN    ketorolac (TORADOL) injection 15 mg  15 mg IntraVENous Q6H PRN    HYDROmorphone (PF) (DILAUDID) injection 0.5 mg  0.5 mg IntraVENous Q4H PRN    pantoprazole (PROTONIX) 40 mg in 0.9% sodium chloride 10 mL injection  40 mg IntraVENous DAILY    0.9% sodium chloride infusion  125 mL/hr IntraVENous CONTINUOUS    sodium chloride (NS) flush 5-40 mL  5-40 mL IntraVENous Q8H    sodium chloride (NS) flush 5-40 mL  5-40 mL IntraVENous PRN    naloxone (NARCAN) injection 0.4 mg  0.4 mg IntraVENous PRN    calcium-vitamin D (OS-CARLOS +D3) 500 mg-200 unit per tablet 1 Tab  1 Tab Oral TID WITH MEALS    senna-docusate (PERICOLACE) 8.6-50 mg per tablet 1 Tab  1 Tab Oral BID    polyethylene glycol (MIRALAX) packet 17 g  17 g Oral DAILY    [START ON 11/16/2020] bisacodyL (DULCOLAX) suppository 10 mg  10 mg Rectal DAILY PRN    enoxaparin (LOVENOX) injection 30 mg  30 mg SubCUTAneous DAILY    ondansetron (ZOFRAN) injection 4 mg  4 mg IntraVENous Q6H PRN    acetaminophen (TYLENOL) tablet 650 mg  650 mg Oral Q6H PRN            Lab Review:     Recent Labs     11/15/20  0539 11/14/20  0529 11/13/20  1657   WBC  --  11.9* 11.4*   HGB 9.1* 11.1* 11.1*   HCT  --  34.1* 34.3*   PLT  --  171 206     Recent Labs     11/15/20  0539 11/14/20  0529 11/13/20  1657    134* 136   K 4.1 5.1 3.4*    103 100   CO2 23 19* 28   GLU 96 87 116*   BUN 15 22* 29*   CREA 0.80 0.99 0.99   CA 7.9* 8.6 9.0   ALB  --  3.6 4.0   TBILI  -- 0.9 0.7   ALT  --  23 28     No results found for: GLUCPOC  No results for input(s): PH, PCO2, PO2, HCO3, FIO2 in the last 72 hours. No results for input(s): INR, INREXT in the last 72 hours. Other pertinent lab: NA         Assessment:     Patient Active Problem List   Diagnosis Code    GERD (gastroesophageal reflux disease) K21.9    Senile purpura ([facility] D69.2    [facility] S72.001A    Paroxysmal atrial fibrillation (H[facility]) I48.0    [facility] S72.009A          Plan:                 1. S/p hip fx s/p orif  PT/OT discharge plans per Dr. Jaciel Nicholson  2. A fib- on lovenox. : switch to oral?  3.  Change protonix to pepcid               ___________________________________________________    Attending Physician: Breanne Henderson MD

## 2020-11-15 NOTE — PROGRESS NOTES
Problem: Self Care Deficits Care Plan (Adult)  Goal: *Acute Goals and Plan of Care (Insert Text)  Description: FUNCTIONAL STATUS PRIOR TO ADMISSION: Patient was modified independent using a cane or rollator occasionally for functional mobility. Reported she was able to do ADLs at MOD I level. Granddaughter and her family live across the street and frequently see her, provide any assistance with iADLs if needed. HOME SUPPORT: The patient lived alone with granddaughter and daughter to provide assistance. Occupational Therapy Goals  Initiated 11/15/2020  1. Patient will perform lower body ADLs with minimal assistance and AE PRN within 7 day(s). 2.  Patient will perform upper body ADLs standing 5 mins without fatigue or LOB with min A within 7 day(s). 3.  Patient will perform toilet transfer with supervision/set-up within 7 day(s). 4.  Patient will perform all aspects of toileting with supervision/set-up within 7 day(s). 5.  Patient will participate in upper extremity therapeutic exercise/activities with supervision/set-up for 10 minutes within 7 day(s). 6.  Patient will utilize energy conservation techniques during functional activities without cues within 7 day(s). Outcome: Progressing Towards Goal     OCCUPATIONAL THERAPY EVALUATION  Patient: Brittny Cary (49 y.o. female)  Date: 11/15/2020  Primary Diagnosis: Hip fracture (Nola Lopez) [S72.009A]  Procedure(s) (LRB):  RIGHT HIP PERCUTANEOUS PINNING 7.3mm CANNULATED SCREWS (Right) 1 Day Post-Op   Precautions: WBAT RLE, fall       ASSESSMENT  Based on the objective data described below, the patient presents with decreased ADL related mobility, decreased ADL related performance, pain, decreased activity tolerance after admission for hip fx s/p R hip precautions. Pt participated well in evaluation, performing bed mobility with up to mod A x 1 and ADL related mobility with min A x 1. Family present and very engaged in pts care.  Pt currently with catheter and impaired reach to B feet resulting in need for up to max A for aspects of LB self-care. Pt is very independent at baseline and strongly indicated her preference to return to this functional level. Family and pt indicated strong preference for return home vs. rehab. If pt continues to progress well and family is able to provide high level of support and participate in training, anticipate pt may be able to return home with family support and home health services. Will continue to follow for most appropriate recommendation and to facilitate continued engagement and training for ADLs/iADLs to optimize functional recovery. Current Level of Function Impacting Discharge (ADLs/self-care): up to max A for aspects of LB self-care without DME. Currently with catheter    Functional Outcome Measure: The patient scored Total: 45/100 on the Barthel Index outcome measure       Other factors to consider for discharge: supportive family lives very close, strong preference to return home vs. rehab     Patient will benefit from skilled therapy intervention to address the above noted impairments. PLAN :  Recommendations and Planned Interventions: self care training, functional mobility training, therapeutic exercise, balance training, therapeutic activities, endurance activities, patient education, home safety training, and family training/education    Frequency/Duration: Patient will be followed by occupational therapy 5 times a week to address goals. Recommendation for discharge: (in order for the patient to meet his/her long term goals)  Therapy up to 5 days/week in SNF setting or an intensive home health therapy program. Family/pt has indicated that their strong preference is to return home with family to provide assistance 24/7 as needed.  If pt d/cs home, would recommend 24/7 assistance in the home and intensive HHOT program    This discharge recommendation:  Has not yet been discussed the attending provider and/or case management    IF patient discharges home will need the following DME: AE: long handled bathing, AE: long handled dressing, bedside commode (family to purchase), hand held shower head (family to purchase) transfer bench (family to purchase), walker: rolling, and patient owns additional DME required for discharge       SUBJECTIVE:   Patient stated I just want to be able to do as much as I can for myself. I'm not normally like this!     OBJECTIVE DATA SUMMARY:   HISTORY:   Past Medical History:   Diagnosis Date    Cancer ([facility] [address]     radiation and lumpectony    GERD (gastroesophageal reflux disease)      Past Surgical History:   Procedure Laterality Date    BREAST SURGERY PROCEDURE UNLISTED      IR KYPHOPLASTY LUMBAR  8/19/2019       Expanded or extensive additional review of patient history:     Home Situation  Home Environment: Private residence  # Steps to Enter: 2  One/Two Story Residence: One story  Living Alone: Yes  Support Systems: Family member(s)(granddaughter lives across street)  Current DME Used/Available at Home: Joe Peek, rollator, Filiberto Rowels, straight, Grab bars  Tub or Shower Type: Tub/Shower combination    Hand dominance: Right    EXAMINATION OF PERFORMANCE DEFICITS:  Cognitive/Behavioral Status:  Neurologic State: Alert;Confused  Orientation Level: Oriented X4  Cognition: Follows commands             Skin: no significnat issues noted, see nursing notes for details. Edema: none observed    Hearing: Auditory  Auditory Impairment: Hard of hearing, bilateral    Vision/Perceptual:       Endorses B cateracts                    Acuity: Impaired near vision         Range of Motion:    AROM: Generally decreased, functional(except RLE)                         Strength:    Strength: Generally decreased, functional                Coordination:  Coordination: Generally decreased, functional  Fine Motor Skills-Upper: Left Intact; Right Intact    Gross Motor Skills-Upper: Left Intact; Right Intact    Tone & Sensation:    Tone: Normal                         Balance:  Sitting: Intact  Standing: Impaired  Standing - Static: Fair;Constant support  Standing - Dynamic : Fair;Constant support    Functional Mobility and Transfers for ADLs:  Bed Mobility:  Supine to Sit: Moderate assistance;Assist x1;Additional time  Scooting: Moderate assistance;Assist x1    Transfers:  Sit to Stand: Minimum assistance; Adaptive equipment;Assist x1  Stand to Sit: Minimum assistance;Assist x1  Bed to Chair: Minimum assistance;Assist x1    ADL Assessment:  Feeding: Modified independent    Oral Facial Hygiene/Grooming: Setup;Stand-by assistance    Bathing: Moderate assistance    Upper Body Dressing: Minimum assistance    Lower Body Dressing: Maximum assistance(without DME)    Toileting: Total assistance;Maximum assistance(with catheter at this time)                ADL Intervention and task modifications:                           Lower Body Dressing Assistance  Socks: Total assistance (dependent)  Adaptive Equipment Used: (none)              Functional Measure:  Barthel Index:    Bathin  Bladder: 0  Bowels: 10  Groomin  Dressin  Feeding: 10  Mobility: 5  Stairs: 0  Toilet Use: 5  Transfer (Bed to Chair and Back): 10  Total: 45/100        The Barthel ADL Index: Guidelines  1. The index should be used as a record of what a patient does, not as a record of what a patient could do. 2. The main aim is to establish degree of independence from any help, physical or verbal, however minor and for whatever reason. 3. The need for supervision renders the patient not independent. 4. A patient's performance should be established using the best available evidence. Asking the patient, friends/relatives and nurses are the usual sources, but direct observation and common sense are also important. However direct testing is not needed.   5. Usually the patient's performance over the preceding 24-48 hours is important, but occasionally longer periods will be relevant. 6. Middle categories imply that the patient supplies over 50 per cent of the effort. 7. Use of aids to be independent is allowed. Toni Carlin., Barthel, D.W. (5116). Functional evaluation: the Barthel Index. [address] [facility] (14)2. IVAN Fung, Zackery Guadalupe., Parris Nowak., Del Sharif, [facility] (1999). Measuring the change indisability after inpatient rehabilitation; comparison of the responsiveness of the Barthel Index and Functional [place] Measure. Journal of Neurology, Neurosurgery, and Psychiatry, 66(4), 607-390. Mei Day, N.J.A, KRYSTLE Karimi, & Neil Cheema M.A. (2004.) Assessment of post-stroke quality of life in cost-effectiveness studies: The usefulness of the Barthel Index and the EuroQoL-5D. Quality of Life Research, 15, 309-78         Occupational Therapy Evaluation Charge Determination   History Examination Decision-Making   MEDIUM Complexity : Expanded review of history including physical, cognitive and psychosocial  history  LOW Complexity : 1-3 performance deficits relating to physical, cognitive , or psychosocial skils that result in activity limitations and / or participation restrictions  MEDIUM Complexity : Patient may present with comorbidities that affect occupational performnce. Miniml to moderate modification of tasks or assistance (eg, physical or verbal ) with assesment(s) is necessary to enable patient to complete evaluation       Based on the above components, the patient evaluation is determined to be of the following complexity level: LOW   Pain Rating:  Not rated, endorsed in RLE    Activity Tolerance:   Fair    After treatment patient left in no apparent distress:    Sitting in chair, Call bell within reach, and Caregiver / family present    COMMUNICATION/EDUCATION:   The patients plan of care was discussed with: Physical therapist and Registered nurse.      Home safety education was provided and the patient/caregiver indicated understanding. and Patient/family have participated as able in goal setting and plan of care. This patients plan of care is appropriate for delegation to MARISSA.     Thank you for this referral.  Juan Bull OT  Time Calculation: 45 mins

## 2020-11-15 NOTE — PROGRESS NOTES
Bedside and Verbal shift change report given to Rosalind Reis RN (oncoming nurse) by Bailey Main RN (offgoing nurse). Report included the following information SBAR and Kardex.

## 2020-11-15 NOTE — PROGRESS NOTES
Bedside and Verbal shift change report given to James Cruz RN (oncoming nurse) by Lainey Barker RN (offgoing nurse). Report included the following information SBAR and Procedure Summary.

## 2020-11-15 NOTE — PROGRESS NOTES
POD 1 Day Post-Op    Procedure:  Procedure(s):  RIGHT HIP PERCUTANEOUS PINNING 7.3mm CANNULATED SCREWS    Subjective:     Pain controlled this morning. Tolerating PO without N/V. No complaints. Denies calf pain or tenderness. Objective:     Blood pressure 125/68, pulse 95, temperature 98.4 °F (36.9 °C), resp. rate 16, height 5' (1.524 m), weight 50.3 kg (111 lb), SpO2 95 %. Temp (24hrs), Av.9 °F (36.6 °C), Min:97 °F (36.1 °C), Max:98.9 °F (37.2 °C)      Physical Exam:  Examination of the right hip reveals that the dressing is clean and intact. Sensation is intact to light touch distally. +PF/DF/EHL motor. Brisk capillary refill. Calf is supple and nontender.     Labs:   Lab Results   Component Value Date/Time    HGB 9.1 (L) 11/15/2020 05:39 AM         Assessment:     Active Problems:    Closed right hip fracture, initial encounter ([facility] [address] (2020)      Paroxysmal atrial fibrillation ([facility] [address] (2020)      Hip fracture ([facility] [address] (2020)        Plan/Recommendations/Medical Decision Making:     WBAT RLE with walker  Continue physical therapy  Ice  Pain control  Lovenox/SCDs for DVT ppx  Begin discharge planning

## 2020-11-16 LAB — HGB BLD-MCNC: 8.8 G/DL (ref 11.5–16)

## 2020-11-16 PROCEDURE — 85018 HEMOGLOBIN: CPT

## 2020-11-16 PROCEDURE — 97530 THERAPEUTIC ACTIVITIES: CPT

## 2020-11-16 PROCEDURE — 97116 GAIT TRAINING THERAPY: CPT

## 2020-11-16 PROCEDURE — 36415 COLL VENOUS BLD VENIPUNCTURE: CPT

## 2020-11-16 PROCEDURE — 97110 THERAPEUTIC EXERCISES: CPT

## 2020-11-16 PROCEDURE — 74011250637 HC RX REV CODE- 250/637: Performed by: FAMILY MEDICINE

## 2020-11-16 PROCEDURE — 2709999900 HC NON-CHARGEABLE SUPPLY

## 2020-11-16 PROCEDURE — 77030036660

## 2020-11-16 PROCEDURE — 74011250637 HC RX REV CODE- 250/637: Performed by: ORTHOPAEDIC SURGERY

## 2020-11-16 PROCEDURE — 65270000029 HC RM PRIVATE

## 2020-11-16 PROCEDURE — 74011250636 HC RX REV CODE- 250/636: Performed by: FAMILY MEDICINE

## 2020-11-16 RX ORDER — FAMOTIDINE 20 MG/1
20 TABLET, FILM COATED ORAL EVERY EVENING
Status: DISCONTINUED | OUTPATIENT
Start: 2020-11-17 | End: 2020-11-19 | Stop reason: HOSPADM

## 2020-11-16 RX ORDER — FAMOTIDINE 20 MG/1
20 TABLET, FILM COATED ORAL ONCE
Status: COMPLETED | OUTPATIENT
Start: 2020-11-16 | End: 2020-11-16

## 2020-11-16 RX ADMIN — DOCUSATE SODIUM 50 MG AND SENNOSIDES 8.6 MG 1 TABLET: 8.6; 5 TABLET, FILM COATED ORAL at 16:58

## 2020-11-16 RX ADMIN — DOCUSATE SODIUM 50 MG AND SENNOSIDES 8.6 MG 1 TABLET: 8.6; 5 TABLET, FILM COATED ORAL at 09:49

## 2020-11-16 RX ADMIN — ENOXAPARIN SODIUM 30 MG: 30 INJECTION SUBCUTANEOUS at 09:49

## 2020-11-16 RX ADMIN — ACETAMINOPHEN 650 MG: 325 TABLET ORAL at 09:49

## 2020-11-16 RX ADMIN — Medication 10 ML: at 14:31

## 2020-11-16 RX ADMIN — ACETAMINOPHEN 650 MG: 325 TABLET ORAL at 18:38

## 2020-11-16 RX ADMIN — FAMOTIDINE 20 MG: 20 TABLET ORAL at 16:58

## 2020-11-16 NOTE — PROGRESS NOTES
Ortho Daily Progress Note      Patient: Leonard Martinez                   MRN: [id number]  Sex: female  YOB: 1928           Age: 80 y.o.    2 Days Post-Op    Procedure(s): RIGHT HIP PERCUTANEOUS PINNING 7.3mm CANNULATED SCREWS    Subjective: Hip is sore but she is doing well    Visit Vitals  /69   Pulse 93   Temp 98.4 °F (36.9 °C)   Resp 16   Ht 5' (1.524 m)   Wt 50.3 kg (111 lb)   SpO2 95%   BMI 21.68 kg/m²        Lab Results:  HGB   Date/Time Value Ref Range Status   11/16/2020 06:16 AM 8.8 (L) 11.5 - 16.0 g/dL Final       Physical Exam:    DRESSING: clean/dry  SWELLING: mild  NEUROLOGICAL: intact  PULSE:yes   GENERAL: alert, cooperative, no distress, appears stated age  WOUND: not examined  MOTION: No significant pain with gentle ROM right hip  DVT Exam: No evidence of DVT seen on physical exam.      Plan:    DVT prophylaxisLovenox  Weight bearing restriction WBAT  Pain Control:pain fairly well-managed with Tylenol  Dispo: planning to go home with [address] [place] [address]  11/16/2020   12:27 PM      .

## 2020-11-16 NOTE — PROGRESS NOTES
Problem: Self Care Deficits Care Plan (Adult)  Goal: *Acute Goals and Plan of Care (Insert Text)  Description: FUNCTIONAL STATUS PRIOR TO ADMISSION: Patient was modified independent using a cane or rollator occasionally for functional mobility. Reported she was able to do ADLs at MOD I level. Granddaughter and her family live across the street and frequently see her, provide any assistance with iADLs if needed. HOME SUPPORT: The patient lived alone with granddaughter and daughter to provide assistance. Occupational Therapy Goals  Initiated 11/15/2020  1. Patient will perform lower body ADLs with minimal assistance and AE PRN within 7 day(s). 2.  Patient will perform upper body ADLs standing 5 mins without fatigue or LOB with min A within 7 day(s). 3.  Patient will perform toilet transfer with supervision/set-up within 7 day(s). 4.  Patient will perform all aspects of toileting with supervision/set-up within 7 day(s). 5.  Patient will participate in upper extremity therapeutic exercise/activities with supervision/set-up for 10 minutes within 7 day(s). 6.  Patient will utilize energy conservation techniques during functional activities without cues within 7 day(s). Outcome: Progressing Towards Goal    OCCUPATIONAL THERAPY TREATMENT  Patient: Tamanna Evans (47 y.o. female)  Date: 11/16/2020  Diagnosis: Hip fracture ([facility] [address] Ela Hinckley   <principal problem not specified>  Procedure(s) (LRB):  RIGHT HIP PERCUTANEOUS PINNING 7.3mm CANNULATED SCREWS (Right) 2 Days Post-Op  Precautions: Fall, WBAT  Chart, occupational therapy assessment, plan of care, and goals were reviewed. ASSESSMENT  Patient continues with skilled OT services and is progressing towards goals.   Pt declined OOB therapy this date 2/2 finishing physical therapy just prior to OT's arrival; therefore, education provided regarding use of leg  (fair technique demonstrated), as well as, recommendation for tub-transfer bench, hand-held shower head and bedside commode. Pt's grandson present during AE/DME training, verbalizing good understanding, with OT also educating both parties on various seated thera ac to increase pt's cardiovascular response to promote healing, with good understanding verbalized. Pt continues to benefit from skilled OT to address functional deficits in an overall attempt at maximizing pt's highest level of safe functional independence prior to discharge from acute OT services, with reporting therapist believing pt would benefit from Francois Haymaker and ADL/IADL Supervision upon discharge. Current Level of Function Impacting Discharge (ADLs): Max A    Other factors to consider for discharge: Max A LE ADLs         PLAN :  Patient continues to benefit from skilled intervention to address the above impairments. Continue treatment per established plan of care. to address goals. Recommend with staff: OOB meals, active ADL engagement, Assist x1 to/from chair    Recommend next OT session: POC progression    Recommendation for discharge: (in order for the patient to meet his/her long term goals)  Occupational therapy at least 2 days/week in the home AND ensure assist and/or supervision for safety with ADL/IADLs    This discharge recommendation:  Has not yet been discussed the attending provider and/or case management    IF patient discharges home will need the following DME: Bedside Commode       SUBJECTIVE:   Patient stated this is my grandson.     OBJECTIVE DATA SUMMARY:   Cognitive/Behavioral Status:  Neurologic State: Alert  Orientation Level: Oriented X4  Cognition: Appropriate for age attention/concentration    Functional Mobility and Transfers for ADLs:  Extended education provided regarding recommendation for tub-transfer bench, handheld shower head and bedside commode usage, with all questions answered and pt's grandson verbalizing good understanding; pt's understanding limited by c/o [facility]     Therapeutic Activities: Educated on use of leg- to assist with RLE placement; fair technique noted    As pt verbalized enjoying spending time with family, pt and grandson educated on various seated above heart therapeutic activities (balloon volleyball, bubble popping, lynette cake) that she could engage in with great grandchildren to increase cardiovascular response to promote RLE healing; good understanding verbalized. Pain:  Indicated RLE pain during movement (did not quantify); nursing aware and following    Activity Tolerance:   Fair, Poor, and requires frequent rest breaks    After treatment patient left in no apparent distress:   Supine in bed, Call bell within reach, Bed / chair alarm activated, Caregiver / family present, and Side rails x 3    COMMUNICATION/COLLABORATION:   The patients plan of care was discussed with: Registered nurse.      Isamar Lozano OT  Time Calculation: 26 mins

## 2020-11-16 NOTE — PROGRESS NOTES
Miko De La Garza, Isabel Gudino, and Jayda Duong Date: 11/13/2020      Subjective:     Patient awake and alert. AF, VSS. Doing really well with PT. .. Current Facility-Administered Medications   Medication Dose Route Frequency    famotidine (PEPCID) tablet 20 mg  20 mg Oral DAILY    0.9% sodium chloride infusion  50 mL/hr IntraVENous CONTINUOUS    sodium chloride (NS) flush 5-40 mL  5-40 mL IntraVENous Q8H    sodium chloride (NS) flush 5-40 mL  5-40 mL IntraVENous PRN    naloxone (NARCAN) injection 0.4 mg  0.4 mg IntraVENous PRN    ketorolac (TORADOL) injection 15 mg  15 mg IntraVENous Q6H PRN    HYDROmorphone (PF) (DILAUDID) injection 0.5 mg  0.5 mg IntraVENous Q4H PRN    sodium chloride (NS) flush 5-40 mL  5-40 mL IntraVENous Q8H    sodium chloride (NS) flush 5-40 mL  5-40 mL IntraVENous PRN    naloxone (NARCAN) injection 0.4 mg  0.4 mg IntraVENous PRN    calcium-vitamin D (OS-CARLOS +D3) 500 mg-200 unit per tablet 1 Tab  1 Tab Oral TID WITH MEALS    senna-docusate (PERICOLACE) 8.6-50 mg per tablet 1 Tab  1 Tab Oral BID    polyethylene glycol (MIRALAX) packet 17 g  17 g Oral DAILY    bisacodyL (DULCOLAX) suppository 10 mg  10 mg Rectal DAILY PRN    enoxaparin (LOVENOX) injection 30 mg  30 mg SubCUTAneous DAILY    ondansetron (ZOFRAN) injection 4 mg  4 mg IntraVENous Q6H PRN    acetaminophen (TYLENOL) tablet 650 mg  650 mg Oral Q6H PRN          Objective:     Patient Vitals for the past 8 hrs:   BP Temp Pulse Resp SpO2   11/16/20 0326 (P) 122/78 (P) 98.2 °F (36.8 °C) (P) 88 (P) 16 (P) 93 %     No intake/output data recorded. 11/14 0701 - 11/15 1900  In: 450 [I.V.:450]  Out: -     Physical Exam: Lungs: clear to auscultation bilaterally  Heart: regular rate and rhythm, S1, S2 normal, no murmur, click, rub or gallop  Abdomen: soft, non-tender.  Bowel sounds normal. No masses,  no organomegaly        Data Review   Recent Results (from the past 24 hour(s))   HEMOGLOBIN    Collection Time: 11/16/20  6:16 AM   Result Value Ref Range    HGB 8.8 (L) 11.5 - 16.0 g/dL           Assessment:     Active Problems:    Closed right hip fracture, initial encounter ([facility] (11/13/2020)      [facility] (11/13/2020)      [facility] (11/14/2020)        Plan:     1) Expect she will be able to go home with home PT  2) Feel she is a poor risk for anticoagulants due to fall risk. Discussed this with daughter who agrees.

## 2020-11-16 NOTE — PROGRESS NOTES
Comprehensive Nutrition Assessment    Type and Reason for Visit: Consult(Geriatric hip fx; fragility )    Nutrition Recommendations/Plan: RD add snacks TID (yogurt to all meals), PB crackers & cheese/crackers. Nutrition Assessment:  Hx GERD and lumbar kyphoplasty. Admit following a GLF at home, R-femur fx, s/p closed reduction with surgical screw repair. Daughter at bedside. Pt claims #. Current wt 111# (bed scale). Noted wt 114# 9/24/2020 and last year 114# (8/19/2019). No recent significant wt changes. Natural teeth. Chew ok and no hx dysphagia however, c/o GERD and \"sometimes when swallowing food feels like it gets stuck and has trouble digesting some foods I haven't had before\". Claims she takes a medicine MD prescribed 30 min before eating and this helps at home (? Pepcid). Also claims, \"I'm a careful eater; avoids highly seasoned foods, and takes prune juice (my medicine) for bowels\". Daughter and pt claim some interruptions and toilet seat too high, so unable to go to  comfortably in private for BM. Offered prune juice. Dislikes milk and \"sweet milk, ONS, and very sweet foods\". Likes yogurt, PB crackers and cheese and crackers. RD to add all for additional protein for surgical healing. BG 96, no hx DM. HGB 8.8 (11/16) post-op. Malnutrition Assessment:  Malnutrition Status: No malnutrition    Estimated Daily Nutrient Needs:  Energy (kcal): 1200; Weight Used for Energy Requirements: Current  Protein (g): 60(~1.2 gm/kg); Weight Used for Protein Requirements: Current  Fluid (ml/day): 1200(Minimum); Method Used for Fluid Requirements: 1 ml/kcal    Nutrition Related Findings:  GERD, fair intake, no BM yet(GERD, fair intake)      Wounds:    Surgical incision       Current Nutrition Therapies:  DIET ONE TIME MESSAGE  DIET REGULAR  DIET ONE TIME MESSAGE  DIET SNACKS HS Snack;  Cheese and crackers    Anthropometric Measures:  · Height:  5' (152.4 cm)  · Current Body Wt:  50.3 kg (111 lb)   · Admission Body Wt:  (N/A, ? accuracy)    · Usual Body Wt:        · Ideal Body Wt:  100 lbs:  111 %   · Adjusted Body Weight:   ; Weight Adjustment for: No adjustment   · Adjusted BMI:       · BMI Category:  Underweight (BMI less than 22) age over 72       Nutrition Diagnosis:   · Altered GI function, ncreased nutrient needs related to (geriatric surgical healing) as evidenced by (Age 80, s/p surgical repair R-femur fx, fair appetite, no BM and C/O GERD)    Nutrition Interventions:   Food and/or Nutrient Delivery: Continue current diet(Snacks: yogurt, PB crackers; cheese and crackers)  Nutrition Education and Counseling: Education completed(Discussed post-op pro/kcal needs with pt and daughter)  Coordination of Nutrition Care:      Goals:  Consume minimum 75% meals + 20 gm supplemental pro daily (snacks)(consume minimum 20 gm supplemental pro daily (source snacks))       Nutrition Monitoring and Evaluation:   Behavioral-Environmental Outcomes:    Food/Nutrient Intake Outcomes: Food and nutrient intake  Physical Signs/Symptoms Outcomes: Constipation, Meal time behavior, Nutrition focused physical findings, Weight    Discharge Planning:    Continue current diet     Electronically signed by Abby Neville RD on 11/16/2020 at 2:35 PM    Contact: Esther

## 2020-11-16 NOTE — PROGRESS NOTES
Bedside and Verbal shift change report given to Merline Chen RN (oncoming nurse) by Nish Palencia RN (offgoing nurse). Report included the following information SBAR.

## 2020-11-16 NOTE — PROGRESS NOTES
EDYTA: Home with home health care    Reason for Admission:   Closed right hip fracture                   RUR Score:  9%                   Plan for utilizing home health:    [address] [place] [address]      PCP: First and Last name:  Dr. Arslan Schneider   Name of Practice: Private Practice   Are you a current patient: Yes/No: yes   Approximate date of last visit: today   Can you participate in a virtual visit with your PCP: no                    Current Advanced Directive/Advance Care Plan: none, Patient is a full code                         Transition of Care Plan:      CM met with patient and daughter Payam Henderson, [phone]) to discuss options for discharge. Daughter was upset about her mother's progress today, stating she had not gotten her Tylenol over the evening and felt that was going to cause her to have to remain in the hospital another 24 hours. CM offered support. Patient has been progressing with therapy and they have recommended rehab vs home health. Daughter stated patient would not be going to a rehab facility, that the plan would be home with home health and increased assistance from the family. Patient's granddaughter (Vy Palomo, [phone]) and her family live across the street from the patient and are able to assist as needed. Referral sent to [address] [place] [address] via [email or web address]; and they can accept. Care Management Interventions  PCP Verified by CM: Yes  Palliative Care Criteria Met (RRAT>21 & CHF Dx)?: No  Transition of Care Consult (CM Consult):  Other, Discharge Planning  MyChart Signup: No  Discharge Durable Medical Equipment: No  Health Maintenance Reviewed: Yes  Physical Therapy Consult: Yes  Occupational Therapy Consult: Yes  Speech Therapy Consult: No  Current Support Network: Own Home, Family Lives Nearby  Confirm Follow Up Transport: Family  The Patient and/or Patient Representative was Provided with a Choice of Provider and Agrees with the Discharge Plan?: Yes  Freedom of Choice List was Provided with Basic Dialogue that Supports the Patient's Individualized Plan of Care/Goals, Treatment Preferences and Shares the Quality Data Associated with the Providers?: Yes  [employer] Information Provided?: No  Discharge Location  Discharge Placement: Home with home health    [address], ACM

## 2020-11-16 NOTE — PROGRESS NOTES
Problem: Pressure Injury - Risk of  Goal: *Prevention of pressure injury  Description: Document Ricardo Scale and appropriate interventions in the flowsheet. Outcome: Progressing Towards Goal  Note: Pressure Injury Interventions:  Sensory Interventions: Assess changes in LOC    Moisture Interventions: Absorbent underpads    Activity Interventions: Increase time out of bed, Pressure redistribution bed/mattress(bed type), PT/OT evaluation    Mobility Interventions: HOB 30 degrees or less    Nutrition Interventions: Document food/fluid/supplement intake    Friction and Shear Interventions: HOB 30 degrees or less                Problem: Falls - Risk of  Goal: *Absence of Falls  Description: Document Don Fall Risk and appropriate interventions in the flowsheet.   Outcome: Progressing Towards Goal  Note: Fall Risk Interventions:  Mobility Interventions: Patient to call before getting OOB    Mentation Interventions: Bed/chair exit alarm, Reorient patient, Room close to nurse's station         Elimination Interventions: Call light in reach    History of Falls Interventions: Bed/chair exit alarm

## 2020-11-16 NOTE — PROGRESS NOTES
Problem: Mobility Impaired (Adult and Pediatric)  Goal: *Acute Goals and Plan of Care (Insert Text)  Description: FUNCTIONAL STATUS PRIOR TO ADMISSION: Patient was independent and active without use of DME.    HOME SUPPORT PRIOR TO ADMISSION: The patient lived alone with family across the street to provide assistance. Physical Therapy Goals  Initiated 11/15/2020  1. Patient will move from supine to sit and sit to supine  and roll side to side in bed with supervision/set-up within 7 day(s). 2.  Patient will transfer from bed to chair and chair to bed with supervision/set-up using the least restrictive device within 7 day(s). 3.  Patient will perform sit to stand with supervision/set-up within 7 day(s). 4.  Patient will ambulate with supervision/set-up for 50 feet with the least restrictive device within 7 day(s). 5.  Patient will ascend/descend 2 stairs with  handrail(s) with minimal assistance/contact guard assist within 7 day(s). Outcome: Progressing Towards Goal  PHYSICAL THERAPY TREATMENT  Patient: Margoth Marin (17 y.o. female)  Date: 11/16/2020  Diagnosis: Hip fracture ([facility] [address] [S72.009A]   <principal problem not specified>  Procedure(s) (LRB):  RIGHT HIP PERCUTANEOUS PINNING 7.3mm CANNULATED SCREWS (Right) 2 Days Post-Op  Precautions:    Chart, physical therapy assessment, plan of care and goals were reviewed. ASSESSMENT  Patient continues with skilled PT services. She is limited by pain and soreness this morning. Only able to tolerate side steps to [facility] and heel raises x10 w/ RW +CGA. Pt had not taken pain meds at time of treatment and had only been receiving Tylenol. Current Level of Function Impacting Discharge (mobility/balance): Continues to require Min-Mod Ax1 for functional transfers and steps to [facility] w/ RW. Other factors to consider for discharge: Lives alone w/ family across the street. Mobility below baseline.  Pain         PLAN :  Patient continues to benefit from skilled intervention to address the above impairments. Continue treatment per established plan of care. to address goals. Recommendation for discharge: (in order for the patient to meet his/her long term goals)  Physical therapy at least 2 days/week in the home AND ensure assist and/or supervision for safety with all mobility and transfers for safety    This discharge recommendation:  Has been made in collaboration with the attending provider and/or case management    IF patient discharges home will need the following DME: patient owns DME required for discharge       SUBJECTIVE:   Patient stated I can do it, I just have to go at my own pace.     OBJECTIVE DATA SUMMARY:   Critical Behavior:  Neurologic State: Alert  Orientation Level: Oriented X4  Cognition: Appropriate for age attention/concentration     Functional Mobility Training:  Bed Mobility:     Supine to Sit: Minimum assistance; Moderate assistance; Additional time;Assist x1;Bed Modified(HOB elevated)  Sit to Supine: Moderate assistance;Assist x1(LE's into bed)           Transfers:  Sit to Stand: Minimum assistance;Assist x1(bed elevated to pt comfort)  Stand to Sit: Contact guard assistance;Assist x1                             Balance:  Sitting: Intact  Standing: Impaired; With support  Standing - Static: Fair(d/t increased pain this morning)  Standing - Dynamic : Fair  Ambulation/Gait Training:  Distance (ft): (only tolerated steps to HOB only )  Assistive Device: Gait belt;Walker, rolling              Right Side Weight Bearing: As tolerated  Left Side Weight Bearing: Full  Base of Support: Narrowed            Therapeutic Exercises:   SUPINE  EXERCISES   Sets   Reps   Active Active Assist   Passive Self ROM   Comments   Ankle Pumps   []                                        []                                        []                                        []                                           Quad Sets   []                                        [] []                                        []                                           Heel Slides   []                                        []                                        []                                        []                                           Hip Abduction   []                                        []                                        []                                        []                                           Glut Sets   []                                        []                                        []                                        []                                              []                                        []                                        []                                        []                                           LAQ's (seated, Bilaterally)  10 []                                        []                                        []                                        []                                             STANDING  EXERCISES   Sets   Reps   Active Active Assist   Passive Self ROM   Comments   Heel Raises  10 [x]                                        []                                        []                                        []                                           Hip Abduction   []                                        []                                        []                                        []                                              []                                        []                                        []                                        []                                              []                                        []                                        []                                        []                                             Pain Ratin-8/10    Activity Tolerance:   Fair      After treatment patient left in no apparent distress:   Supine in bed, Call bell within reach, Caregiver / family present, and Side rails x 3    COMMUNICATION/COLLABORATION:   The patients plan of care was discussed with: Registered nurse.      Annabel Davies PTA   Time Calculation: 32 mins

## 2020-11-16 NOTE — PROGRESS NOTES
Problem: Mobility Impaired (Adult and Pediatric)  Goal: *Acute Goals and Plan of Care (Insert Text)  Description: FUNCTIONAL STATUS PRIOR TO ADMISSION: Patient was independent and active without use of DME.    HOME SUPPORT PRIOR TO ADMISSION: The patient lived alone with family across the street to provide assistance. Physical Therapy Goals  Initiated 11/15/2020  1. Patient will move from supine to sit and sit to supine  and roll side to side in bed with supervision/set-up within 7 day(s). 2.  Patient will transfer from bed to chair and chair to bed with supervision/set-up using the least restrictive device within 7 day(s). 3.  Patient will perform sit to stand with supervision/set-up within 7 day(s). 4.  Patient will ambulate with supervision/set-up for 50 feet with the least restrictive device within 7 day(s). 5.  Patient will ascend/descend 2 stairs with  handrail(s) with minimal assistance/contact guard assist within 7 day(s). Outcome: Progressing Towards Goal   PHYSICAL THERAPY TREATMENT  Patient: Terry Carvajal (3 y.o. female)  Date: 11/16/2020  Diagnosis: Hip fracture ([facility] [address] [S72.009A]   <principal problem not specified>  Procedure(s) (LRB):  RIGHT HIP PERCUTANEOUS PINNING 7.3mm CANNULATED SCREWS (Right) 2 Days Post-Op  Precautions: Fall, WBAT  Chart, physical therapy assessment, plan of care and goals were reviewed. ASSESSMENT  Patient continues with skilled PT services and is progressing towards goals. Pt is moving better this afternoon than this morning. She required increased time for all tasks but when provided with extra time, she was able to amb to and from the bathroom and then participate in RLE ex. I highly recommend that she is up to the chair for all meals. She remains on track for discharge to home with 24/7 hands on assist from family. .     Current Level of Function Impacting Discharge (mobility/balance): min assist to contact guard    Other factors to consider for discharge: lives alone but has local family support. PLAN :  Patient continues to benefit from skilled intervention to address the above impairments. Continue treatment per established plan of care. to address goals. Recommendation for discharge: (in order for the patient to meet his/her long term goals)  Physical therapy at least 2 days/week in the home AND ensure assist and/or supervision for safety with all of mobility. This discharge recommendation:  A follow-up discussion with the attending provider and/or case management is planned    IF patient discharges home will need the following DME: none       SUBJECTIVE:   Patient stated I'm not Speedy Kent.     OBJECTIVE DATA SUMMARY:   Chart checked, pt cleared by nursing  Critical Behavior:  Neurologic State: Alert  Orientation Level: Oriented X4  Cognition: Appropriate for age attention/concentration     Functional Mobility Training:  Bed Mobility:     Supine to Sit: Minimum assistance;Bed Modified  Sit to Supine: Minimum assistance(additional time)           Transfers:  Sit to Stand: Minimum assistance  Stand to Sit: Contact guard assistance                             Balance:  Sitting: Intact; Without support  Standing: Impaired  Standing - Static: Constant support;Good  Standing - Dynamic : Constant support; Fair  Ambulation/Gait Training:  Distance (ft): 25 Feet (ft)(12 feet X 2)  Assistive Device: Gait belt;Walker, rolling  Ambulation - Level of Assistance: Contact guard assistance;Minimal assistance        Gait Abnormalities: Antalgic;Decreased step clearance  Right Side Weight Bearing: As tolerated  Left Side Weight Bearing: Full  Base of Support: Narrowed  Stance: Right decreased  Speed/Shweta: Slow;Pace decreased (<100 feet/min)  Step Length: Left shortened;Right shortened                    Stairs:               Therapeutic Exercises:   SUPINE  EXERCISES   Sets   Reps   Active Active Assist   Passive Self ROM   Comments   Ankle Pumps 5 [x]                                           []                                           []                                           []                                              Quad Sets  5 [x]                                           []                                           []                                           []                                              Heel Slides  5 []                                           [x]                                           []                                           []                                              Hip Abduction  5 []                                           [x]                                           []                                           []                                              Hip External Rotation   []                                           []                                           []                                           []                                              Glut Sets  5 [x]                                           []                                           []                                           []                                                 []                                           []                                           []                                           []                                                 []                                           []                                           []                                           []                                                STANDING  EXERCISES   Sets   Reps   Active Active Assist   Passive Self ROM   Comments   Heel Raises   []                                           []                                           []                                           []                                              Hip Abduction   []                                           [] []                                           []                                              Hip External Rotation   []                                           []                                           []                                           []                                              Hip Flexor Stretch   []                                           []                                           []                                           []                                              Mini squats   []                                           []                                           []                                           []                                              Hamstring Curl   []                                           []                                           []                                           []                                                Pain Ratin right hip, not due for pain meds until 1600. Applied cold gel packs int the sleeve    Activity Tolerance:   Good and requires rest breaks    After treatment patient left in no apparent distress:   Supine in bed, Call bell within reach, Caregiver / family present, and Side rails x 3    COMMUNICATION/COLLABORATION:   The patients plan of care was discussed with: Registered nurseAyanna Nunez   Time Calculation: 42 mins

## 2020-11-16 NOTE — PROGRESS NOTES
Bedside and Verbal shift change report given to Arthur Oropeza RN (oncoming nurse) by Bailey Main RN (offgoing nurse). Report included the following information SBAR.

## 2020-11-17 PROBLEM — M85.80 OSTEOPENIA: Status: ACTIVE | Noted: 2020-11-17

## 2020-11-17 PROCEDURE — 65270000029 HC RM PRIVATE

## 2020-11-17 PROCEDURE — 74011250637 HC RX REV CODE- 250/637: Performed by: ORTHOPAEDIC SURGERY

## 2020-11-17 PROCEDURE — 74011250637 HC RX REV CODE- 250/637: Performed by: FAMILY MEDICINE

## 2020-11-17 PROCEDURE — 97530 THERAPEUTIC ACTIVITIES: CPT

## 2020-11-17 PROCEDURE — 97116 GAIT TRAINING THERAPY: CPT

## 2020-11-17 PROCEDURE — 74011250636 HC RX REV CODE- 250/636: Performed by: FAMILY MEDICINE

## 2020-11-17 PROCEDURE — 97535 SELF CARE MNGMENT TRAINING: CPT

## 2020-11-17 RX ADMIN — ENOXAPARIN SODIUM 30 MG: 30 INJECTION SUBCUTANEOUS at 08:21

## 2020-11-17 RX ADMIN — Medication 10 ML: at 23:41

## 2020-11-17 RX ADMIN — ACETAMINOPHEN 650 MG: 325 TABLET ORAL at 23:40

## 2020-11-17 RX ADMIN — DOCUSATE SODIUM 50 MG AND SENNOSIDES 8.6 MG 1 TABLET: 8.6; 5 TABLET, FILM COATED ORAL at 17:15

## 2020-11-17 RX ADMIN — Medication 10 ML: at 23:40

## 2020-11-17 RX ADMIN — ACETAMINOPHEN 650 MG: 325 TABLET ORAL at 08:21

## 2020-11-17 RX ADMIN — DOCUSATE SODIUM 50 MG AND SENNOSIDES 8.6 MG 1 TABLET: 8.6; 5 TABLET, FILM COATED ORAL at 08:21

## 2020-11-17 RX ADMIN — Medication 1 TABLET: at 17:15

## 2020-11-17 RX ADMIN — FAMOTIDINE 20 MG: 20 TABLET, FILM COATED ORAL at 17:15

## 2020-11-17 RX ADMIN — Medication 10 ML: at 17:18

## 2020-11-17 RX ADMIN — ACETAMINOPHEN 650 MG: 325 TABLET ORAL at 02:27

## 2020-11-17 NOTE — PROGRESS NOTES
Problem: Pressure Injury - Risk of  Goal: *Prevention of pressure injury  Description: Document Ricardo Scale and appropriate interventions in the flowsheet. Outcome: Progressing Towards Goal  Note: Pressure Injury Interventions:  Sensory Interventions: Assess changes in LOC, Float heels, Minimize linen layers    Moisture Interventions: Absorbent underpads, Minimize layers    Activity Interventions: Increase time out of bed, Pressure redistribution bed/mattress(bed type), PT/OT evaluation    Mobility Interventions: HOB 30 degrees or less, Pressure redistribution bed/mattress (bed type), PT/OT evaluation    Nutrition Interventions: Document food/fluid/supplement intake    Friction and Shear Interventions: HOB 30 degrees or less, Lift sheet, Minimize layers     Problem: Falls - Risk of  Goal: *Absence of Falls  Description: Document Don Fall Risk and appropriate interventions in the flowsheet. Outcome: Progressing Towards Goal  Note: Fall Risk Interventions:  Mobility Interventions: Communicate number of staff needed for ambulation/transfer, Patient to call before getting OOB, PT Consult for mobility concerns    Mentation Interventions: Adequate sleep, hydration, pain control, Door open when patient unattended, Family/sitter at bedside    Elimination Interventions: Call light in reach, Patient to call for help with toileting needs    History of Falls Interventions: Evaluate medications/consider consulting pharmacy, Door open when patient unattended, Bed/chair exit alarm  Patient ambulating with PT to chair, in hallway. Tolerating activity well. Problem: Nutrition Deficit  Goal: *Optimize nutritional status  Outcome: Progressing Towards Goal   Patient tolerating regular diet.

## 2020-11-17 NOTE — PROGRESS NOTES
Physician Progress Note      Marcia Barker  CSN #:                  [id number]  :                       3/7/1928  ADMIT DATE:       2020 4:42 PM  [address] [place] DATE:  RESPONDING  PROVIDER #:        Jie Abdalla MD          QUERY TEXT:    Pt admitted with Right hip femoral neck fracture. Pt noted to have Osteopenia per CT . If possible, please document in progress notes and discharge summary if you are evaluating and/or treating any of the following: The medical record reflects the following:  Risk Factors: 81 y/o female admitted post GLF with Right hip femoral neck fracture, hx of GERD  Clinical Indicators: per CT \"Osteopenia with acute right femoral neck fracture, appears impacted, Bones are osteopenic\"  Treatment: ORIF, Ortho Consult, OS CARLOS D+3, IVFs NS @ 125ml/hr, PT  Options provided:  -- Pathological Right hip femoral neck fracture  -- Osteoporotic Right hip femoral neck Fracture  -- Osteoporotic Right hip femoral neck fracture following fall which would not usually break a normal, healthy bone  -- Traumatic Right hip femoral neck fracture  -- Other - I will add my own diagnosis  -- Disagree - Not applicable / Not valid  -- Disagree - Clinically unable to determine / Unknown  -- Refer to Clinical Documentation Reviewer    PROVIDER RESPONSE TEXT:    This patient has an osteoporotic fracture of Right hip femoral neck. Query created by:  Reji Berg on 2020 8:52 AM      Electronically signed by:  Jie Abdalla MD 2020 7:28 AM

## 2020-11-17 NOTE — PROGRESS NOTES
Bedside shift change report given to Alirio Holliday (oncoming nurse) by Rose Glass (offgoing nurse). Report included the following information SBAR, Kardex, Procedure Summary, Intake/Output, MAR and Recent Results.

## 2020-11-17 NOTE — PROGRESS NOTES
Problem: Self Care Deficits Care Plan (Adult)  Goal: *Acute Goals and Plan of Care (Insert Text)  Description: FUNCTIONAL STATUS PRIOR TO ADMISSION: Patient was modified independent using a cane or rollator occasionally for functional mobility. Reported she was able to do ADLs at MOD I level. Granddaughter and her family live across the street and frequently see her, provide any assistance with iADLs if needed. HOME SUPPORT: The patient lived alone with granddaughter and daughter to provide assistance. Occupational Therapy Goals  Initiated 11/15/2020  1. Patient will perform lower body ADLs with minimal assistance and AE PRN within 7 day(s). 2.  Patient will perform upper body ADLs standing 5 mins without fatigue or LOB with min A within 7 day(s). 3.  Patient will perform toilet transfer with supervision/set-up within 7 day(s). 4.  Patient will perform all aspects of toileting with supervision/set-up within 7 day(s). 5.  Patient will participate in upper extremity therapeutic exercise/activities with supervision/set-up for 10 minutes within 7 day(s). 6.  Patient will utilize energy conservation techniques during functional activities without cues within 7 day(s). Outcome: Progressing Towards Goal   OCCUPATIONAL THERAPY TREATMENT  Patient: Malika Celaya (56 y.o. female)  Date: 11/17/2020  [facility]: [facility] [S72.009A]   <principal problem not specified>  Procedure(s) (LRB):  RIGHT HIP PERCUTANEOUS PINNING 7.3mm CANNULATED SCREWS (Right) 3 Days Post-Op  Precautions: Fall, WBAT  Chart, occupational therapy assessment, plan of care, and goals were reviewed. ASSESSMENT  Patient continues with skilled OT services and is progressing towards goals.   Patient and family discussing DME options and ordering for home, all needs met for this, patient up to bathroom multiple times with RW and family member with CGA, educated on fall risk and prevention, continued use of DME, home management and safety, improving activity tolerance through ADL participation. Good understanding noted, 1 more OT session expected. Home with family assist and HH. Current Level of Function Impacting Discharge (ADLs): min A to CGA    Other factors to consider for discharge: 24/7 assist         PLAN :  Patient continues to benefit from skilled intervention to address the above impairments. Continue treatment per established plan of care. to address goals. Recommend with staff: CGA with RW for in bathroom toileting    Recommend next OT session: standing ADLs, LB ADLs, continue POC    Recommendation for discharge: (in order for the patient to meet his/her long term goals)  Occupational therapy at least 2 days/week in the home AND ensure assist and/or supervision for safety with ADLs    This discharge recommendation:  A follow-up discussion with the attending provider and/or case management is planned    IF patient discharges home will need the following DME: patient owns DME required for discharge- family purchased tub transfer bench, BSC and has leg  in room       SUBJECTIVE:   Patient stated I am doing this better today.     OBJECTIVE DATA SUMMARY:   Cognitive/Behavioral Status:  Neurologic State: Alert  Orientation Level: Oriented X4  Cognition: Appropriate safety awareness; Appropriate for age attention/concentration; Appropriate decision making; Follows commands             Functional Mobility and Transfers for ADLs:  Bed Mobility:       Transfers:     Functional Transfers  Toilet Transfer : Minimum assistance(per family member)       Balance:  Sitting: Intact  Standing: Impaired  Standing - Static: Good  Standing - Dynamic : Fair    ADL Intervention:       Grooming  Washing Face: Set-up  Washing Hands: Set-up  Brushing Teeth: Set-up              Upper Body Dressing Assistance  Dressing Assistance: Set-up          Patient instructed and indicated understanding home modifications (ie raise height of ADL objects, appropriate chair heights, recliner safety), safety (ie change of floor surfaces, clear pathways), to increase independence and fall prevention with RW. Patient instructed and indicated understanding the benefits of maintaining activity tolerance, functional mobility, and independence with self care tasks during acute stay  to ensure safe return home and to baseline. Encouraged patient to increase frequency and duration OOB, be out of bed for all meals, perform daily ADLs (as approved by RN/MD regarding bathing etc), and performing functional mobility to/from bathroom. Educated Family on energy conservation/pacing and strategies to maximize her quality of life and decrease her stress/anxiety. 1. Deep breathing    9. Educated on fall alert necklaces/bracelets to increase safety  10. Educated on DME used to help conserve energy, such as a shower seat, a stool or chair in the kitchen, and pushing or pulling items instead of carrying them           Therapeutic Exercises:       Pain:  None noted    Activity Tolerance:   Fair and tolerates ADLs without rest breaks    After treatment patient left in no apparent distress:   Sitting in chair, Call bell within reach, and Caregiver / family present    COMMUNICATION/COLLABORATION:   The patients plan of care was discussed with: Physical therapist and Registered nurse.      Se Horton, MOE  Time Calculation: 9 mins

## 2020-11-17 NOTE — PROGRESS NOTES
Problem: Pressure Injury - Risk of  Goal: *Prevention of pressure injury  Description: Document Ricardo Scale and appropriate interventions in the flowsheet. Outcome: Progressing Towards Goal  Note: Pressure Injury Interventions:  Sensory Interventions: Assess changes in LOC, Keep linens dry and wrinkle-free, Minimize linen layers    Moisture Interventions: Absorbent underpads, Apply protective barrier, creams and emollients, Minimize layers    Activity Interventions: Increase time out of bed, Pressure redistribution bed/mattress(bed type), PT/OT evaluation    Mobility Interventions: HOB 30 degrees or less, Float heels, Pressure redistribution bed/mattress (bed type)    Nutrition Interventions: Document food/fluid/supplement intake    Friction and Shear Interventions: Apply protective barrier, creams and emollients, Minimize layers                Problem: Patient Education: Go to Patient Education Activity  Goal: Patient/Family Education  Outcome: Progressing Towards Goal     Problem: Falls - Risk of  Goal: *Absence of Falls  Description: Document Don Fall Risk and appropriate interventions in the flowsheet.   Outcome: Progressing Towards Goal  Note: Fall Risk Interventions:  Mobility Interventions: Communicate number of staff needed for ambulation/transfer, Patient to call before getting OOB    Mentation Interventions: Adequate sleep, hydration, pain control, Bed/chair exit alarm, Door open when patient unattended, Reorient patient         Elimination Interventions: Call light in reach    History of Falls Interventions: Bed/chair exit alarm, Door open when patient unattended         Problem: Nutrition Deficit  Goal: *Optimize nutritional status  Outcome: Progressing Towards Goal

## 2020-11-17 NOTE — PROGRESS NOTES
Bedside and Verbal shift change report given to Vale Rosenbaum (oncoming nurse) by Vincent Cornelius (offgoing nurse). Report included the following information SBAR, Kardex, Intake/Output, MAR and Recent Results.

## 2020-11-17 NOTE — PROGRESS NOTES
Ortho Daily Progress Note      Patient: Chano Zimmer                   MRN: [id number]  Sex: female  YOB: 1928           Age: 80 y.o.    3 Days Post-Op    Procedure(s): RIGHT HIP PERCUTANEOUS PINNING 7.3mm CANNULATED SCREWS    Afeb  Dressing c/d/i  Swelling minimal  No pain with PROM  NVI  WBAT  Lovenox   Follow up 2wks with QUINTIN Waller  11/17/2020   5:25 PM      .

## 2020-11-17 NOTE — PROGRESS NOTES
Problem: Mobility Impaired (Adult and Pediatric)  Goal: *Acute Goals and Plan of Care (Insert Text)  Description: FUNCTIONAL STATUS PRIOR TO ADMISSION: Patient was independent and active without use of DME.    HOME SUPPORT PRIOR TO ADMISSION: The patient lived alone with family across the street to provide assistance. Physical Therapy Goals  Initiated 11/15/2020  1. Patient will move from supine to sit and sit to supine  and roll side to side in bed with supervision/set-up within 7 day(s). 2.  Patient will transfer from bed to chair and chair to bed with supervision/set-up using the least restrictive device within 7 day(s). 3.  Patient will perform sit to stand with supervision/set-up within 7 day(s). 4.  Patient will ambulate with supervision/set-up for 50 feet with the least restrictive device within 7 day(s). 5.  Patient will ascend/descend 2 stairs with  handrail(s) with minimal assistance/contact guard assist within 7 day(s). Outcome: Progressing Towards Goal   PHYSICAL THERAPY TREATMENT  Patient: Collin Saldana (22 y.o. female)  Date: 11/17/2020  Diagnosis: Hip fracture ([facility] [S72.009A]   <principal problem not specified>  Procedure(s) (LRB):  RIGHT HIP PERCUTANEOUS PINNING 7.3mm CANNULATED SCREWS (Right) 3 Days Post-Op  Precautions: Fall, WBAT  Chart, physical therapy assessment, plan of care and goals were reviewed. ASSESSMENT  Patient continues with skilled PT services and is progressing towards goals. Patient has less pain today and mobility continues to improve! Current Level of Function Impacting Discharge (mobility/balance): Demonstrated to daughter/patient (and patient performed) rolling onto side lying left, placing pillow between knees for comfortable side sleeping. Patient only required contact guard, use of bed rail and cues for rolling and supine-sit. Contact guard for sit-stand. Ambulated with RW, gait belt 50 ft with steady gait, contact guard.  Performed stand-sit at chair at bed side with contact guard and cues for legs to touch back of chair and reach back for arms of chair. Remained seated at bedside with daughter present. Other factors to consider for discharge: Motivated/A & O x 4/Supportive Family/Modified Independent PLOF          PLAN :  Patient continues to benefit from skilled intervention to address the above impairments. Continue treatment per established plan of care. to address goals. Recommendation for discharge: (in order for the patient to meet his/her long term goals)  Physical therapy at least 2 days/week in the home with supervision from family for mobility and ADLs for safety until she returns to modified independent PLOF. Family has all of necessary equipment/DME in the home, is very active in her care and will be capable of taking care of patient in the home. This discharge recommendation:  Has been made in collaboration with the attending provider and/or case management    IF patient discharges home will need the following DME: patient owns DME required for discharge       SUBJECTIVE:   Patient stated I want to do as much as I can on my own.     OBJECTIVE DATA SUMMARY:   Critical Behavior:  Neurologic State: Alert  Orientation Level: Oriented X4  Cognition: Appropriate safety awareness, Appropriate for age attention/concentration, Appropriate decision making, Follows commands     Functional Mobility Training:  Bed Mobility:     Supine to Sit: Contact guard assistance; Additional time; Adaptive equipment(use of bed rail)  Sit to Supine: (left up in chair at bed side)           Transfers:  Sit to Stand: Contact guard assistance  Stand to Sit: Contact guard assistance(cues for safe technique)        Bed to Chair: Contact guard assistance                    Balance:  Sitting: Intact  Standing: Impaired; Without support  Standing - Static: Good;Constant support  Standing - Dynamic : Good;Constant support  Ambulation/Gait Training:  Distance (ft): 50 Feet (ft)  Assistive Device: Walker, rolling;Gait belt  Ambulation - Level of Assistance: Contact guard assistance        Gait Abnormalities: Antalgic  Right Side Weight Bearing: As tolerated     Base of Support: Narrowed  Stance: Right decreased  Speed/Shweta: Slow  Step Length: Left shortened  Swing Pattern: Right asymmetrical          Pain Rating:  3/10    Activity Tolerance:   Good    After treatment patient left in no apparent distress:   Sitting in chair, Call bell within reach, Caregiver / family present, and nurse notified. COMMUNICATION/COLLABORATION:   The patients plan of care was discussed with: Occupational therapist, Registered nurse, and Case management.      Lizette Brunner   Time Calculation: 30 mins

## 2020-11-17 NOTE — PROGRESS NOTES
Miko Parnell, Herman Quintana, and Salma Edwards Date: 11/13/2020      Subjective:     Patient feeling OK. Continues to work with PT. ..       Current Facility-Administered Medications   Medication Dose Route Frequency    famotidine (PEPCID) tablet 20 mg  20 mg Oral QPM    0.9% sodium chloride infusion  50 mL/hr IntraVENous CONTINUOUS    sodium chloride (NS) flush 5-40 mL  5-40 mL IntraVENous Q8H    sodium chloride (NS) flush 5-40 mL  5-40 mL IntraVENous PRN    naloxone (NARCAN) injection 0.4 mg  0.4 mg IntraVENous PRN    ketorolac (TORADOL) injection 15 mg  15 mg IntraVENous Q6H PRN    HYDROmorphone (PF) (DILAUDID) injection 0.5 mg  0.5 mg IntraVENous Q4H PRN    sodium chloride (NS) flush 5-40 mL  5-40 mL IntraVENous Q8H    sodium chloride (NS) flush 5-40 mL  5-40 mL IntraVENous PRN    naloxone (NARCAN) injection 0.4 mg  0.4 mg IntraVENous PRN    calcium-vitamin D (OS-CARLOS +D3) 500 mg-200 unit per tablet 1 Tab  1 Tab Oral TID WITH MEALS    senna-docusate (PERICOLACE) 8.6-50 mg per tablet 1 Tab  1 Tab Oral BID    polyethylene glycol (MIRALAX) packet 17 g  17 g Oral DAILY    bisacodyL (DULCOLAX) suppository 10 mg  10 mg Rectal DAILY PRN    enoxaparin (LOVENOX) injection 30 mg  30 mg SubCUTAneous DAILY    ondansetron (ZOFRAN) injection 4 mg  4 mg IntraVENous Q6H PRN    acetaminophen (TYLENOL) tablet 650 mg  650 mg Oral Q6H PRN          Objective:     Patient Vitals for the past 8 hrs:   BP Temp Pulse Resp SpO2   11/17/20 0235 (!) 148/73 97.4 °F (36.3 °C) 93 16 95 %     No intake/output data recorded. 11/15 0701 - 11/16 1900  In: -   Out: 300 [Urine:300]    Physical Exam: Lungs: clear to auscultation bilaterally  Heart: regular rate and rhythm, S1, S2 normal, no murmur, click, rub or gallop  Abdomen: soft, non-tender. Bowel sounds normal. No masses,  no organomegaly        Data Review No results found for this or any previous visit (from the past 24 hour(s)).         Assessment:     Active Problems:    Closed right hip fracture, initial encounter ([facility] (11/13/2020)      [facility] (11/13/2020)      Hip fracture ([facility] (11/14/2020)        Plan:     Plan to discharge to home with Home nursing and PT/OT

## 2020-11-18 PROCEDURE — 74011250637 HC RX REV CODE- 250/637: Performed by: ORTHOPAEDIC SURGERY

## 2020-11-18 PROCEDURE — 97530 THERAPEUTIC ACTIVITIES: CPT

## 2020-11-18 PROCEDURE — 97110 THERAPEUTIC EXERCISES: CPT

## 2020-11-18 PROCEDURE — 97116 GAIT TRAINING THERAPY: CPT

## 2020-11-18 PROCEDURE — 74011250636 HC RX REV CODE- 250/636: Performed by: FAMILY MEDICINE

## 2020-11-18 PROCEDURE — 74011250637 HC RX REV CODE- 250/637: Performed by: FAMILY MEDICINE

## 2020-11-18 PROCEDURE — 65270000029 HC RM PRIVATE

## 2020-11-18 RX ADMIN — DOCUSATE SODIUM 50 MG AND SENNOSIDES 8.6 MG 1 TABLET: 8.6; 5 TABLET, FILM COATED ORAL at 08:36

## 2020-11-18 RX ADMIN — Medication 1 TABLET: at 11:51

## 2020-11-18 RX ADMIN — POLYETHYLENE GLYCOL 3350 17 G: 17 POWDER, FOR SOLUTION ORAL at 08:36

## 2020-11-18 RX ADMIN — Medication 1 TABLET: at 07:11

## 2020-11-18 RX ADMIN — ACETAMINOPHEN 650 MG: 325 TABLET ORAL at 14:58

## 2020-11-18 RX ADMIN — Medication 10 ML: at 07:12

## 2020-11-18 RX ADMIN — Medication 10 ML: at 17:07

## 2020-11-18 RX ADMIN — Medication 1 TABLET: at 17:00

## 2020-11-18 RX ADMIN — Medication 10 ML: at 23:47

## 2020-11-18 RX ADMIN — ACETAMINOPHEN 650 MG: 325 TABLET ORAL at 23:39

## 2020-11-18 RX ADMIN — FAMOTIDINE 20 MG: 20 TABLET, FILM COATED ORAL at 17:00

## 2020-11-18 RX ADMIN — ENOXAPARIN SODIUM 30 MG: 30 INJECTION SUBCUTANEOUS at 09:15

## 2020-11-18 RX ADMIN — ACETAMINOPHEN 650 MG: 325 TABLET ORAL at 08:39

## 2020-11-18 NOTE — PROGRESS NOTES
Problem: Pressure Injury - Risk of  Goal: *Prevention of pressure injury  Description: Document Ricardo Scale and appropriate interventions in the flowsheet. Outcome: Progressing Towards Goal  Note: Pressure Injury Interventions:  Sensory Interventions: Assess changes in LOC, Float heels, Minimize linen layers    Moisture Interventions: Absorbent underpads, Minimize layers    Activity Interventions: Increase time out of bed, Pressure redistribution bed/mattress(bed type), PT/OT evaluation    Mobility Interventions: HOB 30 degrees or less, Pressure redistribution bed/mattress (bed type), PT/OT evaluation    Nutrition Interventions: Document food/fluid/supplement intake    Friction and Shear Interventions: HOB 30 degrees or less, Lift sheet, Minimize layers                Problem: Patient Education: Go to Patient Education Activity  Goal: Patient/Family Education  Outcome: Progressing Towards Goal     Problem: Falls - Risk of  Goal: *Absence of Falls  Description: Document Don Fall Risk and appropriate interventions in the flowsheet.   Outcome: Progressing Towards Goal  Note: Fall Risk Interventions:  Mobility Interventions: Communicate number of staff needed for ambulation/transfer, Patient to call before getting OOB, PT Consult for mobility concerns    Mentation Interventions: Adequate sleep, hydration, pain control, Door open when patient unattended, Family/sitter at bedside         Elimination Interventions: Call light in reach, Patient to call for help with toileting needs    History of Falls Interventions: Evaluate medications/consider consulting pharmacy, Door open when patient unattended, Bed/chair exit alarm

## 2020-11-18 NOTE — PROGRESS NOTES
Bedside and Verbal shift change report given to [address] [place] [address] Sunday (oncoming nurse) by Dominique Hutton (offgoing nurse). Report included the following information SBAR, Kardex, Intake/Output and MAR.

## 2020-11-18 NOTE — PROGRESS NOTES
Ortho Progress Note    S: pain well controlled, no new complaints; \"ready to go home\"; denies numbness, tingling, focal weakness, cp, sob, fever, chills, increasing or new pain. O: Thigh soft, no swelling, dressing with small circular bloody stain, but dry and intact. No posterior calf pain, no edema, cap refill brisk, foot warm, dp 2+; DF/PF intact, SILT. A/P:   Orthopedically stable; POD 4 CRPP   Dressing - marked, leave in place if it remains dry, intact and stain does not increase; if needed change with sterile 4x4 and tegaderm; Will check dressing tomorrow, but not concerned for infection or active bleeding. PT/OT - WBAT  DVT ppx - Lovenox daily for 28 days  Pain - continue current meds, Ice  Dispo - Home with ; stable for ortho standpoint.

## 2020-11-18 NOTE — PROGRESS NOTES
Problem: Mobility Impaired (Adult and Pediatric)  Goal: *Acute Goals and Plan of Care (Insert Text)  Description: FUNCTIONAL STATUS PRIOR TO ADMISSION: Patient was independent and active without use of DME.    HOME SUPPORT PRIOR TO ADMISSION: The patient lived alone with family across the street to provide assistance. Physical Therapy Goals  Initiated 11/15/2020  1. Patient will move from supine to sit and sit to supine  and roll side to side in bed with supervision/set-up within 7 day(s). 2.  Patient will transfer from bed to chair and chair to bed with supervision/set-up using the least restrictive device within 7 day(s). 3.  Patient will perform sit to stand with supervision/set-up within 7 day(s). 4.  Patient will ambulate with supervision/set-up for 50 feet with the least restrictive device within 7 day(s). 5.  Patient will ascend/descend 2 stairs with  handrail(s) with minimal assistance/contact guard assist within 7 day(s). Outcome: Progressing Towards Goal   PHYSICAL THERAPY TREATMENT  Patient: Luis E Harris (20 y.o. female)  Date: 11/18/2020  Diagnosis: Hip fracture ([facility] [S72.009A]   <principal problem not specified>  Procedure(s) (LRB):  RIGHT HIP PERCUTANEOUS PINNING 7.3mm CANNULATED SCREWS (Right) 4 Days Post-Op  Precautions: Fall, WBAT  Chart, physical therapy assessment, plan of care and goals were reviewed. ASSESSMENT  Patient continues with skilled PT services and is progressing towards goals. Patient is cleared for discharge from PT standpoint. Patient  is independent with post-op hip exercise protocol and has same in written, illlustrated form. PT Discharge instructions reviewed. Patient and granddaughter demonstrated understanding. Current Level of Function Impacting Discharge (mobility/balance): Contact guard assistance with cues and use of adaptive equipment with all mobility. Ambulated [address] ft with RW and gait belt, steady only slightly antalgic gait.     Barthel functional score has improved from 45/100 to 60/100, indicating moderate impaired ability to care for basic self-needs/dependency on others. Other factors to consider for discharge: Motivated/A & O x 4/Supportive Family/Modified independent PLOF          PLAN :  Patient continues to benefit from skilled intervention to address the above impairments. Continue treatment per established plan of care. to address goals. Recommendation for discharge: (in order for the patient to meet his/her long term goals)  Physical therapy at least 2 days/week in the home AND ensure assist and/or supervision for safety with ADLs and mobility (family will be doing this). This discharge recommendation:  Has been made in collaboration with the attending provider and/or case management    IF patient discharges home will need the following DME: patient owns DME required for discharge       SUBJECTIVE:   Patient stated Yani Dewey will do whatever you ask me to do! Kristina Mcneal    OBJECTIVE DATA SUMMARY:   Critical Behavior:  Neurologic State: Alert  Orientation Level: Oriented X4  Cognition: Appropriate decision making, Appropriate for age attention/concentration, Appropriate safety awareness, Follows commands     Functional Mobility Training:  Bed Mobility:     Supine to Sit: Contact guard assistance; Adaptive equipment  Sit to Supine: Contact guard assistance  Scooting: Contact guard assistance        Transfers:  Sit to Stand: Contact guard assistance  Stand to Sit: Contact guard assistance                             Balance:  Sitting: Intact  Standing: Impaired; With support  Standing - Static: Good;Constant support  Standing - Dynamic : Good;Constant support  Ambulation/Gait Training:  Distance (ft): 70 Feet (ft)  Assistive Device: Walker, rolling;Gait belt  Ambulation - Level of Assistance: Contact guard assistance        Gait Abnormalities: Antalgic  Right Side Weight Bearing: As tolerated     Base of Support: Narrowed  Stance: Right decreased  Speed/Shweta: Slow  Step Length: Left shortened  Swing Pattern: Right asymmetrical                 Therapeutic Exercises:   Performed the following and gave same in written, illustrated form:  Ankle pumps x 10 reps  Heel digs (Ham Sets) 5 second hold x 5 reps  Knee Presses (Quad Sets) 5 second hold x 5 reps  Buttock squeezes (Glute Sets) 5 second hold x 5 reps  Assisted Heel Slides x 5  Assisted Hip Abd-Add x 5  Assisted hip ER/IR x 5    Pain Rating:  3/10    Activity Tolerance:   Good    After treatment patient left in no apparent distress:   Supine in bed, Call bell within reach, Caregiver / family present, Side rails x 3, and nurse notified. COMMUNICATION/COLLABORATION:   The patients plan of care was discussed with: Registered nurse and Case management.      Katherine Trevino   Time Calculation: 50 mins

## 2020-11-18 NOTE — PROGRESS NOTES
Bedside shift change report given to Tanvir Carvajal (oncoming nurse) by Gage Costello (offgoing nurse). Report included the following information Kardex, MAR and Recent Results.

## 2020-11-18 NOTE — PROGRESS NOTES
Problem: Pressure Injury - Risk of  Goal: *Prevention of pressure injury  Description: Document Ricardo Scale and appropriate interventions in the flowsheet. Outcome: Progressing Towards Goal  Note: Pressure Injury Interventions:  Sensory Interventions: Assess changes in LOC, Check visual cues for pain, Minimize linen layers, Keep linens dry and wrinkle-free    Moisture Interventions: Absorbent underpads    Activity Interventions: Increase time out of bed, Pressure redistribution bed/mattress(bed type), PT/OT evaluation    Mobility Interventions: HOB 30 degrees or less, Pressure redistribution bed/mattress (bed type), PT/OT evaluation    Nutrition Interventions: Document food/fluid/supplement intake    Friction and Shear Interventions: Lift sheet, Minimize layers    Problem: Falls - Risk of  Goal: *Absence of Falls  Description: Document Don Fall Risk and appropriate interventions in the flowsheet.   Outcome: Progressing Towards Goal  Note: Fall Risk Interventions:  Mobility Interventions: Communicate number of staff needed for ambulation/transfer, Patient to call before getting OOB, Utilize walker, cane, or other assistive device    Mentation Interventions: Adequate sleep, hydration, pain control, Door open when patient unattended, Family/sitter at bedside    Elimination Interventions: Call light in reach, Patient to call for help with toileting needs    History of Falls Interventions: Door open when patient unattended, Evaluate medications/consider consulting pharmacy    Problem: Nutrition Deficit  Goal: *Optimize nutritional status  Outcome: Progressing Towards Goal

## 2020-11-18 NOTE — PROGRESS NOTES
Problem: Self Care Deficits Care Plan (Adult)  Goal: *Acute Goals and Plan of Care (Insert Text)  Description: FUNCTIONAL STATUS PRIOR TO ADMISSION: Patient was modified independent using a cane or rollator occasionally for functional mobility. Reported she was able to do ADLs at MOD I level. Granddaughter and her family live across the street and frequently see her, provide any assistance with iADLs if needed. HOME SUPPORT: The patient lived alone with granddaughter and daughter to provide assistance. Occupational Therapy Goals  Initiated 11/15/2020  1. Patient will perform lower body ADLs with minimal assistance and AE PRN within 7 day(s). 2.  Patient will perform upper body ADLs standing 5 mins without fatigue or LOB with min A within 7 day(s). 3.  Patient will perform toilet transfer with supervision/set-up within 7 day(s). 4.  Patient will perform all aspects of toileting with supervision/set-up within 7 day(s). 5.  Patient will participate in upper extremity therapeutic exercise/activities with supervision/set-up for 10 minutes within 7 day(s). 6.  Patient will utilize energy conservation techniques during functional activities without cues within 7 day(s). Outcome: Progressing Towards Goal     OCCUPATIONAL THERAPY TREATMENT  Patient: Emili Cortes (57 y.o. female)  Date: 11/18/2020  Diagnosis: [facility] ([facility] [S72.009A]   <principal problem not specified>  Procedure(s) (LRB):  RIGHT HIP PERCUTANEOUS PINNING 7.3mm CANNULATED SCREWS (Right) 4 Days Post-Op  Precautions: Fall, WBAT  Chart, occupational therapy assessment, plan of care, and goals were reviewed. ASSESSMENT  Patient continues with skilled OT services and is progressing towards goals. Patient received in bathroom with [facility] assisting pt. Pt requires overall CGA for all aspects of toileting. Pt is very motivated to participate in therapies and ambulated with CGA to door and back with RW.  Pt then returned to bed and educated on safe positioning and managing RW when reaching out of VINH to make bed. Pt educated on home management and safety and continued improvement through ADL participation. Recommend pt return home with [facility] and continued assistance from family with ADLs and transfers. Current Level of Function Impacting Discharge (ADLs): CGA    Other factors to consider for discharge: 24/7 supervision and A         PLAN :  Patient continues to benefit from skilled intervention to address the above impairments. Continue treatment per established plan of care. to address goals. Recommend with staff: CGA and RW to bathroom    Recommend next OT session: Review LB ADLs, standing tolerance    Recommendation for discharge: (in order for the patient to meet his/her long term goals)  Occupational therapy at least 2 days/week in the home AND ensure assist and/or supervision for safety with transfers and ADLs    This discharge recommendation:  Has been made in collaboration with the attending provider and/or case management    IF patient discharges home will need the following DME: patient owns DME required for discharge       SUBJECTIVE:   Patient stated I like therapy a lot.     OBJECTIVE DATA SUMMARY:   Cognitive/Behavioral Status:  Neurologic State: Alert  Orientation Level: Oriented X4  Cognition: Appropriate decision making; Appropriate for age attention/concentration; Appropriate safety awareness; Follows commands             Functional Mobility and Transfers for ADLs:  Bed Mobility:       Transfers:     Functional Transfers  Bathroom Mobility: Contact guard assistance  Toilet Transfer : Contact guard assistance       Balance:  Standing: Impaired; With support  Standing - Static: Good;Constant support  Standing - Dynamic : Good;Constant support    ADL Intervention:  Feeding  Feeding Assistance: Set-up  Container Management: Set-up  Cutting Food: Set-up                             Toileting  Toileting Assistance: Contact guard assistance  Bladder Hygiene: Contact guard assistance  Clothing Management: Contact guard assistance         Therapeutic Exercises:       Pain:  0/10    Activity Tolerance:   Good    After treatment patient left in no apparent distress:   Sitting in chair, Call bell within reach and Caregiver / family present    COMMUNICATION/COLLABORATION:   The patients plan of care was discussed with: Registered nurse.      Mirella Montalvo  Time Calculation: 26 mins

## 2020-11-18 NOTE — PROGRESS NOTES
Miko Snider, Joshua Jaime, and Chandrakant Galvan Date: 11/13/2020      Subjective:     Patient had some sundowning last night. Sleeping well now. Some equipment to be delivered to house today. Doing well with PT . Current Facility-Administered Medications   Medication Dose Route Frequency    famotidine (PEPCID) tablet 20 mg  20 mg Oral QPM    0.9% sodium chloride infusion  50 mL/hr IntraVENous CONTINUOUS    sodium chloride (NS) flush 5-40 mL  5-40 mL IntraVENous Q8H    sodium chloride (NS) flush 5-40 mL  5-40 mL IntraVENous PRN    naloxone (NARCAN) injection 0.4 mg  0.4 mg IntraVENous PRN    ketorolac (TORADOL) injection 15 mg  15 mg IntraVENous Q6H PRN    HYDROmorphone (PF) (DILAUDID) injection 0.5 mg  0.5 mg IntraVENous Q4H PRN    sodium chloride (NS) flush 5-40 mL  5-40 mL IntraVENous Q8H    sodium chloride (NS) flush 5-40 mL  5-40 mL IntraVENous PRN    naloxone (NARCAN) injection 0.4 mg  0.4 mg IntraVENous PRN    calcium-vitamin D (OS-CARLOS +D3) 500 mg-200 unit per tablet 1 Tab  1 Tab Oral TID WITH MEALS    senna-docusate (PERICOLACE) 8.6-50 mg per tablet 1 Tab  1 Tab Oral BID    polyethylene glycol (MIRALAX) packet 17 g  17 g Oral DAILY    bisacodyL (DULCOLAX) suppository 10 mg  10 mg Rectal DAILY PRN    enoxaparin (LOVENOX) injection 30 mg  30 mg SubCUTAneous DAILY    ondansetron (ZOFRAN) injection 4 mg  4 mg IntraVENous Q6H PRN    acetaminophen (TYLENOL) tablet 650 mg  650 mg Oral Q6H PRN          Objective:     No data found. No intake/output data recorded. 11/16 0701 - 11/17 1900  In: -   Out: 300 [Urine:300]    Physical Exam: Lungs: clear to auscultation bilaterally  Heart: regular rate and rhythm, S1, S2 normal, no murmur, click, rub or gallop  Abdomen: soft, non-tender. Bowel sounds normal. No masses,  no organomegaly        Data Review No results found for this or any previous visit (from the past 24 hour(s)).         Assessment:     Active Problems:    Closed right hip fracture, initial encounter ([facility] (11/13/2020)      Paroxysmal atrial fibrillation ([facility] (11/13/2020)      Hip fracture ([facility] (11/14/2020)      Osteopenia (11/17/2020)        Plan:     1) Plan for discharge in AM

## 2020-11-19 VITALS
HEART RATE: 76 BPM | OXYGEN SATURATION: 94 % | BODY MASS INDEX: 22.71 KG/M2 | TEMPERATURE: 97.6 F | SYSTOLIC BLOOD PRESSURE: 158 MMHG | HEIGHT: 60 IN | WEIGHT: 115.7 LBS | RESPIRATION RATE: 18 BRPM | DIASTOLIC BLOOD PRESSURE: 74 MMHG

## 2020-11-19 PROCEDURE — 74011250636 HC RX REV CODE- 250/636: Performed by: FAMILY MEDICINE

## 2020-11-19 PROCEDURE — 74011250637 HC RX REV CODE- 250/637: Performed by: ORTHOPAEDIC SURGERY

## 2020-11-19 RX ORDER — FACIAL-BODY WIPES
10 EACH TOPICAL
Qty: 5 SUPPOSITORY | Refills: 0 | Status: SHIPPED | OUTPATIENT
Start: 2020-11-19

## 2020-11-19 RX ORDER — FERROUS SULFATE, DRIED 160(50) MG
1 TABLET, EXTENDED RELEASE ORAL 2 TIMES DAILY WITH MEALS
Qty: 60 TAB | Refills: 5 | Status: SHIPPED | OUTPATIENT
Start: 2020-11-19

## 2020-11-19 RX ORDER — ACETAMINOPHEN 325 MG/1
650 TABLET ORAL
Qty: 100 TAB | Refills: 0 | Status: SHIPPED | OUTPATIENT
Start: 2020-11-19

## 2020-11-19 RX ADMIN — Medication 10 ML: at 06:55

## 2020-11-19 RX ADMIN — ENOXAPARIN SODIUM 30 MG: 30 INJECTION SUBCUTANEOUS at 08:50

## 2020-11-19 RX ADMIN — ACETAMINOPHEN 650 MG: 325 TABLET ORAL at 07:00

## 2020-11-19 RX ADMIN — Medication 1 TABLET: at 06:54

## 2020-11-19 RX ADMIN — Medication 10 ML: at 06:54

## 2020-11-19 NOTE — PROGRESS NOTES
Miko Meyer, Joshua Medellin and Dottie Duff Date: 11/13/2020      Subjective:     Patient doing pretty well. Ready for discharge to home. .       Current Facility-Administered Medications   Medication Dose Route Frequency    famotidine (PEPCID) tablet 20 mg  20 mg Oral QPM    0.9% sodium chloride infusion  50 mL/hr IntraVENous CONTINUOUS    sodium chloride (NS) flush 5-40 mL  5-40 mL IntraVENous Q8H    sodium chloride (NS) flush 5-40 mL  5-40 mL IntraVENous PRN    naloxone (NARCAN) injection 0.4 mg  0.4 mg IntraVENous PRN    HYDROmorphone (PF) (DILAUDID) injection 0.5 mg  0.5 mg IntraVENous Q4H PRN    sodium chloride (NS) flush 5-40 mL  5-40 mL IntraVENous Q8H    sodium chloride (NS) flush 5-40 mL  5-40 mL IntraVENous PRN    naloxone (NARCAN) injection 0.4 mg  0.4 mg IntraVENous PRN    calcium-vitamin D (OS-CARLOS +D3) 500 mg-200 unit per tablet 1 Tab  1 Tab Oral TID WITH MEALS    senna-docusate (PERICOLACE) 8.6-50 mg per tablet 1 Tab  1 Tab Oral BID    polyethylene glycol (MIRALAX) packet 17 g  17 g Oral DAILY    bisacodyL (DULCOLAX) suppository 10 mg  10 mg Rectal DAILY PRN    enoxaparin (LOVENOX) injection 30 mg  30 mg SubCUTAneous DAILY    ondansetron (ZOFRAN) injection 4 mg  4 mg IntraVENous Q6H PRN    acetaminophen (TYLENOL) tablet 650 mg  650 mg Oral Q6H PRN          Objective:     Patient Vitals for the past 8 hrs:   BP Temp Pulse Resp SpO2 Weight   11/19/20 0804 (!) 167/76 97.6 °F (36.4 °C) 77 18 94 %    11/19/20 0417 (!) 156/70 98.3 °F (36.8 °C) 80 18 94 % 115 lb 11.2 oz (52.5 kg)     No intake/output data recorded. No intake/output data recorded. Physical Exam: Lungs: clear to auscultation bilaterally  Heart: regular rate and rhythm, S1, S2 normal, no murmur, click, rub or gallop  Abdomen: soft, non-tender. Bowel sounds normal. No masses,  no organomegaly        Data Review No results found for this or any previous visit (from the past 24 hour(s)).         Assessment: Active Problems:    Closed right [facility] (11/13/2020)      Paroxysmal atrial fibrillation ([facility] (11/13/2020)      Hip fracture ([facility] (11/14/2020)      Osteopenia (11/17/2020)        Plan:     1) Have asked to have home health nursing, PT, OT set up  2) Will discharge on low dose Eliquis x 4 weeks. 3) Needs to go home by squad.

## 2020-11-19 NOTE — PROGRESS NOTES
Bedside and Verbal shift change report given to [address] [place] [address] (oncoming nurse) by Jen Sanchez (offgoing nurse). Report included the following information SBAR, Kardex, Intake/Output and MAR.

## 2020-11-19 NOTE — DISCHARGE INSTRUCTIONS
Post op Discharge Instructions Hip Fracture   [facility]  [phone]    Patient Name: Hilario Rosenberg  Date of admission:  11/13/2020  Date of procedure: 11/14/2020   Procedure: Procedure(s):  RIGHT HIP PERCUTANEOUS PINNING 7.3mm CANNULATED SCREWS  PCP: Jose Resendiz MD  Date of discharge: No discharge date for patient encounter. Follow up office visit    See Dr. Rivas Leipsic approximately 2 weeks from date of surgery. Call [phone] to make an appointment. Activity   Walk with your walker weight bearing as tolerated weight as instructed by your physical therapist. Continue using your walker until seen for follow-up visit.  Practice your exercises 3 times a day as instructed by the physical therapist.  Leah Pancake up frequently and walk (with assistance as needed)   You may not drive     Incision Care   The surgical dressing is to remain on the hip for 4 days. On the 4th day gently peel the dressing off by carefully lifting the edge and stretching it slightly to break the adhesive seal   If your dressing comes loose/off before the 4th day, you may replace it with a dry sterile gauze dressing; change it daily. Once the incision is not draining, leave it open to air   You may take a shower with the dressing in place. Once the dressing is removed,  may shower and get your incision wet but do not submerge your incision under water in a bath tub, hot tub or swimming pool for 6 weeks after surgery. Preventing blood clots   Take Lovenox as prescribed.  Wear elastic stockings as instructed.   Remove them for approximately 1 hour daily for showering/sponge bathing    Pain management   Take pain medication as prescribed; decrease the amount you take as your pain lessens   Avoid alcoholic beverages while taking pain medications   Place an ice bag on the hip for 15-20 minutes after exercising and as needed throughout the day and night    Diet   Resume usual diet; encourage fluids; eat foods high in fiber, calcium and vitamin D.   You may want to take a stool softener (such as Senokot-S or Colace) to prevent constipation while you are taking pain medication.  If constipation occurs, take a laxative (such as Dulcolax tablets, Miralax, or a suppository)      When to call your Orthopaedic Surgeon. If you call after 5pm or on a weekend, the on call physician will be contacted   Pain that is not relieved by pain medication, ice, activity   Signs of infection  o Incision is reddened  o Incision continues to drain; drainage has an odor  o Persistent fever over 101 degrees   Signs of a blood clot in your leg  o Calf pain, tenderness, redness and/or swelling of lower leg    When to call your Primary Care Physician   Concerns about medical conditions such as diabetes, high blood pressure, asthma, congestive heart failure   Call if blood sugars are elevated, persistent headache or dizziness, coughing or congestion, constipation or diarrhea, burning with urination, abnormal heart rate (slow or fast)    When to call 911 and go to the nearest emergency room   Acute onset of chest pain, shortness of breath, difficulty breathing      Patient Discharge Instructions    Db Cole / [id number] : 3/7/1928    Admitted 2020 Discharged: 2020     Take Home Medications            · It is important that you take the medication exactly as they are prescribed. · Keep your medication in the bottles provided by the pharmacist and keep a list of the medication names, dosages, and times to be taken in your wallet. · Do not take other medications without consulting your doctor. What to do at Home    Recommended diet: Regular Diet,     Recommended activity: Activity as tolerated, per PT and OT    If you experience any of the following symptoms increased pain, SOB, please follow up with Dr Jerry Goodman.     Follow-up Appointments   Procedures    FOLLOW UP VISIT Appointment in: 6 Weeks     Standing Status:   Standing     Number of Occurrences:   1     Order Specific Question:   Appointment in     Answer:   6 Weeks            Information obtained by :  I understand that if any problems occur once I am at home I am to contact my physician. I understand and acknowledge receipt of the instructions indicated above.                                                                                                                                            Physician's or R.N.'s Signature                                                                  Date/Time                                                                                                                                              Patient or Representative Signature                                                          Date/Time

## 2020-11-19 NOTE — PROGRESS NOTES
Brief Ortho Note    POD 5 [facility]: No new complaints, eager to leave    Dressing - unchanged; leave in place for another 3 days; change remove then and leave open to air if bradshaw, or cover with dry sterile dressing if still draining  PT/OT - WBAT  DVT ppx - being sent with Eliquis which is reasonable for DVT ppx  Pain - continue current meds, Ice  Dispo - Home with [facility]; stable for ortho standpoint. Will Sign off, Call with questions.

## 2020-11-19 NOTE — PROGRESS NOTES
EDYTA: Plan for discharge home today with family and home health. [facility] has accepted patient. [facility] phone [phone] transport time set for 12 PM.    CM met with patient and daughter Kaya at bedside. Family prefers ambulance transport. CM requested transport with [facility]. [facility] has been notified of DC today. Medicare pt has received, reviewed, and signed 2nd IM letter informing them of their right to appeal the discharge. Signed copy has been placed on pt bedside chart.     Liam Domínguez, BSW/CRM

## 2020-11-19 NOTE — PROGRESS NOTES
Reviewed prescriptions, signs and symptoms to report and gave opportunity for questions. Patient left via stretcher with medical transport assistance and driven home. Daughter following behind.

## 2020-12-09 NOTE — DISCHARGE SUMMARY
Physician Discharge Summary     Patient ID:  Manan France  [id number]  15 y.o.  3/7/1928    Admit date: 11/13/2020    Discharge date and time: 12/9/2020    Admission Diagnoses: Hip fracture [facility]) Karen Chavezwda    Discharge Diagnoses:  Principal Diagnosis Closed right hip fracture, initial encounter [facility])                                            Principal Problem:    [facility] (11/13/2020)    Active Problems:    Paroxysmal atrial fibrillation ([facility] (11/13/2020)      Hip fracture ([facility] (11/14/2020)      Osteopenia (11/17/2020)           Hospital Course: Patient presents with acute R hip pain after tripping on a rug and having a GLF at home. Found to have a R hip fracture. Patient had a percutaneous screw fixation performed. Patient tolerated well and did well with PT post op. Her appetite and ambulation and pain control improved quickly. She was discharged to home with home health, home PT in much improved condition. Patient lives with daughter who is very capable and cares for her mother. PCP:     Consults: Orthopedic Surgery        Discharge Exam:  Visit Vitals  BP (!) 158/74   Pulse 76   Temp 97.6 °F (36.4 °C)   Resp 18   Ht 5' (1.524 m)   Wt 115 lb 11.2 oz (52.5 kg)   SpO2 94%   BMI 22.60 kg/m²     Neck: supple, symmetrical, trachea midline, no adenopathy, thyroid: not enlarged, symmetric, no tenderness/mass/nodules, no carotid bruit and no JVD  Lungs: clear to auscultation bilaterally  Heart: regular rate and rhythm, S1, S2 normal, no murmur, click, rub or gallop  Abdomen: soft, non-tender. Bowel sounds normal. No masses,  no organomegaly  Neurologic: Grossly normal    Disposition: home    Patient Instructions:   Cannot display discharge medications since this patient is not currently admitted.     Activity: Activity as tolerated  Diet: Regular Diet  Wound Care: Keep wound clean and dry    Follow-up Appointments   Procedures    FOLLOW UP VISIT Appointment in: 6 Weeks     Standing Status:   Standing     Number of Occurrences:   1     Order Specific Question:   Appointment in     Answer:   6 Weeks          Signed:  Mahesh Thakur MD  12/9/2020  7:54 AM

## 2021-02-05 ENCOUNTER — APPOINTMENT (OUTPATIENT)
Dept: CT IMAGING | Age: 86
DRG: 522 | End: 2021-02-05
Attending: PHYSICIAN ASSISTANT
Payer: MEDICARE

## 2021-02-05 ENCOUNTER — APPOINTMENT (OUTPATIENT)
Dept: GENERAL RADIOLOGY | Age: 86
DRG: 522 | End: 2021-02-05
Attending: PHYSICIAN ASSISTANT
Payer: MEDICARE

## 2021-02-05 ENCOUNTER — APPOINTMENT (OUTPATIENT)
Dept: GENERAL RADIOLOGY | Age: 86
DRG: 522 | End: 2021-02-05
Attending: EMERGENCY MEDICINE
Payer: MEDICARE

## 2021-02-05 ENCOUNTER — HOSPITAL ENCOUNTER (INPATIENT)
Age: 86
LOS: 12 days | Discharge: HOME HEALTH | DRG: 522 | End: 2021-02-17
Attending: EMERGENCY MEDICINE | Admitting: INTERNAL MEDICINE
Payer: MEDICARE

## 2021-02-05 DIAGNOSIS — S72.001A CLOSED FRACTURE OF RIGHT HIP, INITIAL ENCOUNTER (HCC): Primary | ICD-10-CM

## 2021-02-05 LAB
ALBUMIN SERPL-MCNC: 3.6 G/DL (ref 3.5–5)
ALBUMIN/GLOB SERPL: 0.9 {RATIO} (ref 1.1–2.2)
ALP SERPL-CCNC: 93 U/L (ref 45–117)
ALT SERPL-CCNC: 18 U/L (ref 12–78)
ANION GAP SERPL CALC-SCNC: 8 MMOL/L (ref 5–15)
AST SERPL-CCNC: 31 U/L (ref 15–37)
BASOPHILS # BLD: 0 K/UL (ref 0–0.1)
BASOPHILS NFR BLD: 0 % (ref 0–1)
BILIRUB SERPL-MCNC: 0.5 MG/DL (ref 0.2–1)
BUN SERPL-MCNC: 20 MG/DL (ref 6–20)
BUN/CREAT SERPL: 25 (ref 12–20)
CALCIUM SERPL-MCNC: 9 MG/DL (ref 8.5–10.1)
CHLORIDE SERPL-SCNC: 102 MMOL/L (ref 97–108)
CO2 SERPL-SCNC: 24 MMOL/L (ref 21–32)
COMMENT, HOLDF: NORMAL
COVID-19 RAPID TEST, COVR: NOT DETECTED
CREAT SERPL-MCNC: 0.79 MG/DL (ref 0.55–1.02)
CRP SERPL-MCNC: 6.25 MG/DL (ref 0–0.6)
DIFFERENTIAL METHOD BLD: ABNORMAL
EOSINOPHIL # BLD: 0 K/UL (ref 0–0.4)
EOSINOPHIL NFR BLD: 0 % (ref 0–7)
ERYTHROCYTE [DISTWIDTH] IN BLOOD BY AUTOMATED COUNT: 15.9 % (ref 11.5–14.5)
ERYTHROCYTE [SEDIMENTATION RATE] IN BLOOD: 32 MM/HR (ref 0–30)
GLOBULIN SER CALC-MCNC: 3.9 G/DL (ref 2–4)
GLUCOSE SERPL-MCNC: 100 MG/DL (ref 65–100)
HCT VFR BLD AUTO: 33.7 % (ref 35–47)
HGB BLD-MCNC: 10.7 G/DL (ref 11.5–16)
IMM GRANULOCYTES # BLD AUTO: 0 K/UL (ref 0–0.04)
IMM GRANULOCYTES NFR BLD AUTO: 0 % (ref 0–0.5)
INR PPP: 1.1 (ref 0.9–1.1)
LYMPHOCYTES # BLD: 0.6 K/UL (ref 0.8–3.5)
LYMPHOCYTES NFR BLD: 7 % (ref 12–49)
MCH RBC QN AUTO: 28 PG (ref 26–34)
MCHC RBC AUTO-ENTMCNC: 31.8 G/DL (ref 30–36.5)
MCV RBC AUTO: 88.2 FL (ref 80–99)
MONOCYTES # BLD: 1.3 K/UL (ref 0–1)
MONOCYTES NFR BLD: 16 % (ref 5–13)
NEUTS SEG # BLD: 6.4 K/UL (ref 1.8–8)
NEUTS SEG NFR BLD: 77 % (ref 32–75)
NRBC # BLD: 0 K/UL (ref 0–0.01)
NRBC BLD-RTO: 0 PER 100 WBC
PLATELET # BLD AUTO: 285 K/UL (ref 150–400)
PMV BLD AUTO: 10.4 FL (ref 8.9–12.9)
POTASSIUM SERPL-SCNC: 4.4 MMOL/L (ref 3.5–5.1)
PROT SERPL-MCNC: 7.5 G/DL (ref 6.4–8.2)
PROTHROMBIN TIME: 11.4 SEC (ref 9–11.1)
RBC # BLD AUTO: 3.82 M/UL (ref 3.8–5.2)
RBC MORPH BLD: ABNORMAL
RBC MORPH BLD: ABNORMAL
SAMPLES BEING HELD,HOLD: NORMAL
SARS-COV-2, COV2: NORMAL
SODIUM SERPL-SCNC: 134 MMOL/L (ref 136–145)
SOURCE, COVRS: NORMAL
WBC # BLD AUTO: 8.3 K/UL (ref 3.6–11)

## 2021-02-05 PROCEDURE — 85025 COMPLETE CBC W/AUTO DIFF WBC: CPT

## 2021-02-05 PROCEDURE — 72192 CT PELVIS W/O DYE: CPT

## 2021-02-05 PROCEDURE — 85610 PROTHROMBIN TIME: CPT

## 2021-02-05 PROCEDURE — 93005 ELECTROCARDIOGRAM TRACING: CPT

## 2021-02-05 PROCEDURE — 86901 BLOOD TYPING SEROLOGIC RH(D): CPT

## 2021-02-05 PROCEDURE — 77030038269 HC DRN EXT URIN PURWCK BARD -A

## 2021-02-05 PROCEDURE — 85652 RBC SED RATE AUTOMATED: CPT

## 2021-02-05 PROCEDURE — 86140 C-REACTIVE PROTEIN: CPT

## 2021-02-05 PROCEDURE — 71045 X-RAY EXAM CHEST 1 VIEW: CPT

## 2021-02-05 PROCEDURE — 87635 SARS-COV-2 COVID-19 AMP PRB: CPT

## 2021-02-05 PROCEDURE — 65270000029 HC RM PRIVATE

## 2021-02-05 PROCEDURE — 99282 EMERGENCY DEPT VISIT SF MDM: CPT

## 2021-02-05 PROCEDURE — 86923 COMPATIBILITY TEST ELECTRIC: CPT

## 2021-02-05 PROCEDURE — 74011250637 HC RX REV CODE- 250/637: Performed by: PHYSICIAN ASSISTANT

## 2021-02-05 PROCEDURE — 36415 COLL VENOUS BLD VENIPUNCTURE: CPT

## 2021-02-05 PROCEDURE — 80053 COMPREHEN METABOLIC PANEL: CPT

## 2021-02-05 PROCEDURE — 73502 X-RAY EXAM HIP UNI 2-3 VIEWS: CPT

## 2021-02-05 PROCEDURE — 73552 X-RAY EXAM OF FEMUR 2/>: CPT

## 2021-02-05 PROCEDURE — 96372 THER/PROPH/DIAG INJ SC/IM: CPT

## 2021-02-05 PROCEDURE — 74011250636 HC RX REV CODE- 250/636: Performed by: EMERGENCY MEDICINE

## 2021-02-05 RX ORDER — TRAMADOL HYDROCHLORIDE 50 MG/1
50 TABLET ORAL
Status: DISCONTINUED | OUTPATIENT
Start: 2021-02-05 | End: 2021-02-17 | Stop reason: HOSPADM

## 2021-02-05 RX ORDER — HALOPERIDOL 5 MG/ML
2 INJECTION INTRAMUSCULAR ONCE
Status: COMPLETED | OUTPATIENT
Start: 2021-02-05 | End: 2021-02-05

## 2021-02-05 RX ORDER — LIDOCAINE 4 G/100G
1 PATCH TOPICAL EVERY 24 HOURS
Status: DISCONTINUED | OUTPATIENT
Start: 2021-02-05 | End: 2021-02-17 | Stop reason: HOSPADM

## 2021-02-05 RX ORDER — ACETAMINOPHEN 325 MG/1
650 TABLET ORAL EVERY 6 HOURS
Status: DISCONTINUED | OUTPATIENT
Start: 2021-02-05 | End: 2021-02-17 | Stop reason: HOSPADM

## 2021-02-05 RX ADMIN — ACETAMINOPHEN 650 MG: 325 TABLET ORAL at 22:04

## 2021-02-05 RX ADMIN — HALOPERIDOL 2 MG: 5 INJECTION INTRAMUSCULAR at 17:43

## 2021-02-05 NOTE — ED PROVIDER NOTES
Patient is a 70-year-old female presenting to emergency department for evaluation of right hip pain. Patient's daughter aids in history. Yesterday afternoon patient was walking with a Rollator and she states that the Rollator rolled away from her, causing her to fall onto the ground. She denies blow to head or loss of consciousness. She is on a blood thinner. She was able to bear weight at first but the pain has been progressively worsening and now she has trouble walking secondary to the pain. Back in November she had a ground-level fall and fractured the right hip which required surgery, she is followed by Dr. Genoveva Marroquin with [facility] orthopedics. Denies any additional medical complaints at this time.            Past Medical History:   Diagnosis Date    Cancer ([address]     radiation and lumpectony    GERD (gastroesophageal reflux disease)        Past Surgical History:   Procedure Laterality Date    HX ORTHOPAEDIC      right hip    IR KYPHOPLASTY LUMBAR  8/19/2019    [place] BREAST SURGERY PROCEDURE UNLISTED           Family History:   Problem Relation Age of Onset    Hypertension Mother     Heart Disease Mother     Cancer Mother         breast    Cancer Father         prostate       Social History     Socioeconomic History    Marital status:      Spouse name: Not on file    Number of children: Not on file    Years of education: Not on file    Highest education level: Not on file   Occupational History    Not on file   Social Needs    Financial resource strain: Not on file    Food insecurity     Worry: Not on file     Inability: Not on file    Transportation needs     Medical: Not on file     Non-medical: Not on file   Tobacco Use    Smoking status: Former Smoker    Smokeless tobacco: Never Used   Substance and Sexual Activity    Alcohol use: No    Drug use: No    Sexual activity: Not on file   Lifestyle    Physical activity     Days per week: Not on file     Minutes per session: Not on file   • Stress: Not on file   Relationships   • Social connections     Talks on phone: Not on file     Gets together: Not on file     Attends [employer] service: Not on file     Active member of club or organization: Not on file     Attends meetings of clubs or organizations: Not on file     Relationship status: Not on file   • Intimate partner violence     Fear of current or ex partner: Not on file     Emotionally abused: Not on file     Physically abused: Not on file     Forced sexual activity: Not on file   Other Topics Concern   • Not on file   Social History Narrative   • Not on file         ALLERGIES: Ciprofloxacin, Codeine, Hydrocodone, Morphine, and Oxycodone    Review of Systems   Constitutional: Negative for chills and fever.   HENT: Negative for ear pain and sore throat.    Eyes: Negative for visual disturbance.   Respiratory: Negative for cough and shortness of breath.    Cardiovascular: Negative for chest pain.   Gastrointestinal: Negative for abdominal pain.   Genitourinary: Negative for flank pain.   Musculoskeletal: Positive for arthralgias (right hip). Negative for back pain.   Skin: Negative for color change.   Neurological: Negative for dizziness and headaches.   Psychiatric/Behavioral: Negative for confusion.       Vitals:    02/05/21 1441   BP: 133/74   Pulse: 84   Resp: 20   Temp: 98 °F (36.7 °C)   SpO2: 96%            Physical Exam  Vitals signs and nursing note reviewed.   Constitutional:       General: She is not in acute distress.     Appearance: Normal appearance. She is not ill-appearing.   HENT:      Head: Normocephalic and atraumatic.      Mouth/Throat:      Pharynx: Oropharynx is clear.   Eyes:      Extraocular Movements: Extraocular movements intact.      Conjunctiva/sclera: Conjunctivae normal.   Neck:      Musculoskeletal: No neck rigidity.   Cardiovascular:      Rate and Rhythm: Normal rate and regular rhythm.   Pulmonary:      Effort: Pulmonary effort is normal.      Breath sounds:  Normal breath sounds. Abdominal:      Palpations: Abdomen is soft. Tenderness: There is no abdominal tenderness. Musculoskeletal:      Right hip: She exhibits decreased range of motion, decreased strength, tenderness and bony tenderness. She exhibits no swelling, no crepitus, no deformity and no laceration. Left hip: Normal.      Lumbar back: Normal.   Skin:     General: Skin is warm and dry. Neurological:      General: No focal deficit present. Mental Status: She is alert and oriented to person, place, and time. Psychiatric:         Mood and Affect: Mood normal.          MDM  Number of Diagnoses or Management Options  Closed fracture of right hip, initial encounter [facility])  Diagnosis management comments: Patient is alert, well-appearing, afebrile, vitals stable. Mechanical ground-level fall yesterday with pain in the right hip and trouble bearing weight. She had a femoral neck fracture which was treated surgically back in November. Followed by [facility] AT [facility] orthopedics. Xray today shows lateral femoral displacement and compression of the right femoral neck. Consult placed with orthopedics, Dr. Drema Epley, who recommends admission for surgery tomorrow morning. Pt informed of findings and plan and she became agitated. I brought her daughter in to speak with her and she remains agitated but eventually agrees to lab work, admission, and surgery. 2 mg IM Haldol given to assist in agitation as nurses were initially unable to obtain labs. Discussed patient with ED attending Gail Rousseau MD who has personally evaluated pt and agrees with current management plan. 5:57 PM  Change of shift. Care of patient signed over to Dr. Gregorio Paniagua. Bedside handoff complete. Awaiting call-back from PCP for admission orders. Pre-op labs and imaging pending.           Amount and/or Complexity of Data Reviewed  Tests in the radiology section of CPT®: reviewed  Discuss the patient with other providers: yes      ED Course as of Feb 05 1631   Fri Feb 05, 2021   1613 IMPRESSION     1. Marked foreshortening of the proximal femur at the femoral neck since surgery  in November 2020, with intact femoral neck screws which now project outside the  articular cortex of the right femoral head. 2. Lateral displacement of the femoral head itself with a change from superior  valgus positioning and surgery to inferior varus positioning on this  examination, and widening of the inferior joint space. 3. New compression or impaction at the femoral neck fracture site with  significant new cephalad migration of the distal femoral fracture fragment. 4. Stable old right pubic fracture.    XR HIP RT W OR WO PELV 2-3 VWS [EP]      ED Course User Index  [EP] QUINTIN Austin       Procedures

## 2021-02-05 NOTE — ED NOTES
Patient has a fracture of her left hip. Consult has been placed for Dr. Bashir Garcia to admit medically. He has just returned the call and will come see her and admit. Orthopedics is already seen her and has ordered a CT of her hip. Patient will be admitted for further evaluation and management of this fracture.

## 2021-02-05 NOTE — CONSULTS
ORTHO CONSULT NOTE    Date of Consultation:  2021  Referring Physician: Jamie RIBEIRO  CC: R hip fracture    HPI:  [facility] is a 80 y.o. female presents with R hip pain to ED and found to have fracture on xray; pt recently had R hip fixed in 2020, as using a walker for ambulation; today she fell when her rollator rolled away from her; her daughter and patient do no endorse any other injuries. Currently denies other extremity pain, cp, sob, fever, chills, numbness, tingling, focal foot weakness, ha, neck or back pain. Past Medical History:   Diagnosis Date    Cancer [facility])     radiation and lumpectony    GERD (gastroesophageal reflux disease)       Past Surgical History:   Procedure Laterality Date    HX ORTHOPAEDIC      right hip    IR KYPHOPLASTY LUMBAR  2019    [place] BREAST SURGERY PROCEDURE UNLISTED        Family History   Problem Relation Age of Onset    Hypertension Mother     Heart Disease Mother     Cancer Mother         breast    Cancer Father         prostate      Social History     Tobacco Use    Smoking status: Former Smoker    Smokeless tobacco: Never Used   Substance Use Topics    Alcohol use: No     Allergies   Allergen Reactions    Ciprofloxacin Other (comments)     Bad yeast inf    Codeine Nausea and Vomiting    Hydrocodone Other (comments)     \"burning the esophagus to the point patient can't swallow per family and patient\"    Morphine Nausea Only    Oxycodone Other (comments)     \"burning of esophagus\"        Review of Systems:  Per HPI.     Objective:     Patient Vitals for the past 8 hrs:   BP Temp Pulse Resp SpO2   21 1441 133/74 98 °F (36.7 °C) 84 20 96 %     Temp (24hrs), Av °F (36.7 °C), Min:98 °F (36.7 °C), Max:98 °F (36.7 °C)      EXAM:   Agigtated, sitting up in bed, does not appear in pain; non-labored respirations; RLE shortened, no rotational deformity; skin overlying fracture site intact, mild swelling and ttp, remainder of leg wnl; SILT, DF/PF intact; DP 2+, cap refill brisk. Remaining extremities nonttp, no swelling; no pitting edema, or posterior calf ttp. Imaging Review:   Xr Hip Rt W Or Wo Pelv 2-3 Vws    Result Date: 2/5/2021  1. Marked foreshortening of the proximal femur at the femoral neck since surgery in November 2020, with intact femoral neck screws which now project outside the articular cortex of the right femoral head. 2. Lateral displacement of the femoral head itself with a change from superior valgus positioning and surgery to inferior varus positioning on this examination, and widening of the inferior joint space. 3. New compression or impaction at the femoral neck fracture site with significant new cephalad migration of the distal femoral fracture fragment. 4. Stable old right pubic fracture. Xr Femur Rt 2 Vs    Result Date: 2/5/2021  No fracture is identified seen. X-ray report for postoperative changes femoral neck with foreshortening. Nbaila Moises Chest Port    Result Date: 2/5/2021  Increased hazy opacification of the right lower lobe may be related to atelectasis or airspace disease.       Labs:   Recent Results (from the past 24 hour(s))   EKG, 12 LEAD, INITIAL    Collection Time: 02/05/21  5:27 PM   Result Value Ref Range    Ventricular Rate 95 BPM    Atrial Rate 95 BPM    P-R Interval 116 ms    QRS Duration 68 ms    Q-T Interval 354 ms    QTC Calculation (Bezet) 444 ms    Calculated P Axis 87 degrees    Calculated R Axis 39 degrees    Calculated T Axis 56 degrees    Diagnosis       Normal sinus rhythm  Nonspecific ST and T wave abnormality  When compared with ECG of 13-NOV-2020 17:14,  Sinus rhythm has replaced Atrial fibrillation  ST elevation now present in Inferior leads  Nonspecific T wave abnormality no longer evident in Inferior leads  Nonspecific T wave abnormality, improved in Anterolateral leads  QT has shortened     CBC WITH AUTOMATED DIFF    Collection Time: 02/05/21  5:33 PM   Result Value Ref Range    WBC 8.3 3.6 - 11.0 K/uL    RBC 3.82 3.80 - 5.20 M/uL    HGB 10.7 (L) 11.5 - 16.0 g/dL    HCT 33.7 (L) 35.0 - 47.0 %    MCV 88.2 80.0 - 99.0 FL    MCH 28.0 26.0 - 34.0 PG    MCHC 31.8 30.0 - 36.5 g/dL    RDW 15.9 (H) 11.5 - 14.5 %    PLATELET 323 951 - 963 K/uL    MPV 10.4 8.9 - 12.9 FL    NRBC 0.0 0  WBC    ABSOLUTE NRBC 0.00 0.00 - 0.01 K/uL    NEUTROPHILS 77 (H) 32 - 75 %    LYMPHOCYTES 7 (L) 12 - 49 %    MONOCYTES 16 (H) 5 - 13 %    EOSINOPHILS 0 0 - 7 %    BASOPHILS 0 0 - 1 %    IMMATURE GRANULOCYTES 0 0.0 - 0.5 %    ABS. NEUTROPHILS 6.4 1.8 - 8.0 K/UL    ABS. LYMPHOCYTES 0.6 (L) 0.8 - 3.5 K/UL    ABS. MONOCYTES 1.3 (H) 0.0 - 1.0 K/UL    ABS. EOSINOPHILS 0.0 0.0 - 0.4 K/UL    ABS. BASOPHILS 0.0 0.0 - 0.1 K/UL    ABS. IMM. GRANS. 0.0 0.00 - 0.04 K/UL    DF SMEAR SCANNED      RBC COMMENTS ANISOCYTOSIS  1+        RBC COMMENTS MAVERICK CELLS  PRESENT       METABOLIC PANEL, COMPREHENSIVE    Collection Time: 02/05/21  5:33 PM   Result Value Ref Range    Sodium 134 (L) 136 - 145 mmol/L    Potassium 4.4 3.5 - 5.1 mmol/L    Chloride 102 97 - 108 mmol/L    CO2 24 21 - 32 mmol/L    Anion gap 8 5 - 15 mmol/L    Glucose 100 65 - 100 mg/dL    BUN 20 6 - 20 MG/DL    Creatinine 0.79 0.55 - 1.02 MG/DL    BUN/Creatinine ratio 25 (H) 12 - 20      GFR est AA >60 >60 ml/min/1.73m2    GFR est non-AA >60 >60 ml/min/1.73m2    Calcium 9.0 8.5 - 10.1 MG/DL    Bilirubin, total 0.5 0.2 - 1.0 MG/DL    ALT (SGPT) 18 12 - 78 U/L    AST (SGOT) 31 15 - 37 U/L    Alk.  phosphatase 93 45 - 117 U/L    Protein, total 7.5 6.4 - 8.2 g/dL    Albumin 3.6 3.5 - 5.0 g/dL    Globulin 3.9 2.0 - 4.0 g/dL    A-G Ratio 0.9 (L) 1.1 - 2.2     TYPE & SCREEN    Collection Time: 02/05/21  5:33 PM   Result Value Ref Range    Crossmatch Expiration 02/08/2021,2359     ABO/Rh(D) A NEGATIVE     Antibody screen NEG    PROTHROMBIN TIME + INR    Collection Time: 02/05/21  5:33 PM   Result Value Ref Range    INR 1.1 0.9 - 1.1      Prothrombin time 11.4 (H) 9.0 - 11.1 sec   SARS-COV-2    Collection Time: 02/05/21  5:33 PM   Result Value Ref Range    SARS-CoV-2 Please find results under separate order     SED RATE (ESR)    Collection Time: 02/05/21  5:33 PM   Result Value Ref Range    Sed rate, automated 32 (H) 0 - 30 mm/hr   C REACTIVE PROTEIN, QT    Collection Time: 02/05/21  5:33 PM   Result Value Ref Range    C-Reactive protein 6.25 (H) 0.00 - 0.60 mg/dL   SAMPLES BEING HELD    Collection Time: 02/05/21  5:33 PM   Result Value Ref Range    SAMPLES BEING HELD 1 red     COMMENT        Add-on orders for these samples will be processed based on acceptable specimen integrity and analyte stability, which may vary by analyte. COVID-19 RAPID TEST    Collection Time: 02/05/21  5:33 PM   Result Value Ref Range    Specimen source Nasopharyngeal      COVID-19 rapid test Not detected NOTD         Impression:     Active Problems:    Hip fracture ([address] (11/14/2020)        Plan:   Dr. Mindy Dominguez explained the nature of the injury and [facility] Discussed potential risks/benefits/alternatives of surgery and blood transfusions. [facility] consents to both. Plan for R [facility] on 2/6 at 0800. Consent signed and placed on chart. - Preop workup ongoing for medical clearance. Pt will be admitted to medicine service given advanced age and co-morbidities. Chart Review reveals RCRI -0; not on Anticoagulants; no current medical concerns  - CBC, CMP, Lipids, UA, Coags, T&S, COVID, EKG, Chest XR imaging ordered; addt'l CT pelvis, femur films for surgical planning pending  - NPO (except meds with sips) after midnight, Bedrest, NV checks q 4 hours  - Hold chem DVT ppx 12 hours prior to OR, okay for dose tonight, SCDs  - Pain - Acetaminophen, Lidocaine patch, Tramdol, Ice      is aware and agrees with above plan.       QUINTIN Phan  Orthopedic Trauma Service  [facility]

## 2021-02-05 NOTE — ED TRIAGE NOTES
Pt arrives via EMS from home. Pt reports slipping down while walking with her walker yesterday. Pt reports right R hip pain. Hx of right hip replacement. EMS reports /70s, 100% RA. Pt has been taking tylenol for pain.

## 2021-02-06 ENCOUNTER — APPOINTMENT (OUTPATIENT)
Dept: GENERAL RADIOLOGY | Age: 86
DRG: 522 | End: 2021-02-06
Attending: STUDENT IN AN ORGANIZED HEALTH CARE EDUCATION/TRAINING PROGRAM
Payer: MEDICARE

## 2021-02-06 ENCOUNTER — APPOINTMENT (OUTPATIENT)
Dept: GENERAL RADIOLOGY | Age: 86
DRG: 522 | End: 2021-02-06
Attending: INTERNAL MEDICINE
Payer: MEDICARE

## 2021-02-06 ENCOUNTER — ANESTHESIA EVENT (OUTPATIENT)
Dept: SURGERY | Age: 86
DRG: 522 | End: 2021-02-06
Payer: MEDICARE

## 2021-02-06 ENCOUNTER — ANESTHESIA (OUTPATIENT)
Dept: SURGERY | Age: 86
DRG: 522 | End: 2021-02-06
Payer: MEDICARE

## 2021-02-06 PROBLEM — F03.A0 MILD DEMENTIA: Status: ACTIVE | Noted: 2021-02-06

## 2021-02-06 LAB
APPEARANCE UR: CLEAR
ATRIAL RATE: 95 BPM
BACTERIA URNS QL MICRO: NEGATIVE /HPF
BILIRUB UR QL: NEGATIVE
CALCULATED P AXIS, ECG09: 87 DEGREES
CALCULATED R AXIS, ECG10: 39 DEGREES
CALCULATED T AXIS, ECG11: 56 DEGREES
COLOR UR: ABNORMAL
DIAGNOSIS, 93000: NORMAL
EPITH CASTS URNS QL MICRO: ABNORMAL /LPF
GLUCOSE UR STRIP.AUTO-MCNC: NEGATIVE MG/DL
HGB UR QL STRIP: ABNORMAL
HYALINE CASTS URNS QL MICRO: ABNORMAL /LPF (ref 0–5)
INR PPP: 1.1 (ref 0.9–1.1)
KETONES UR QL STRIP.AUTO: 80 MG/DL
LEUKOCYTE ESTERASE UR QL STRIP.AUTO: NEGATIVE
MUCOUS THREADS URNS QL MICRO: ABNORMAL /LPF
NITRITE UR QL STRIP.AUTO: NEGATIVE
OTHER,OTHU: ABNORMAL
P-R INTERVAL, ECG05: 116 MS
PH UR STRIP: 5 [PH] (ref 5–8)
PROT UR STRIP-MCNC: ABNORMAL MG/DL
PROTHROMBIN TIME: 11.4 SEC (ref 9–11.1)
Q-T INTERVAL, ECG07: 354 MS
QRS DURATION, ECG06: 68 MS
QTC CALCULATION (BEZET), ECG08: 444 MS
RBC #/AREA URNS HPF: ABNORMAL /HPF (ref 0–5)
SP GR UR REFRACTOMETRY: 1.03
UR CULT HOLD, URHOLD: NORMAL
UROBILINOGEN UR QL STRIP.AUTO: 0.2 EU/DL (ref 0.2–1)
VENTRICULAR RATE, ECG03: 95 BPM
WBC URNS QL MICRO: ABNORMAL /HPF (ref 0–4)

## 2021-02-06 PROCEDURE — C1713 ANCHOR/SCREW BN/BN,TIS/BN: HCPCS | Performed by: STUDENT IN AN ORGANIZED HEALTH CARE EDUCATION/TRAINING PROGRAM

## 2021-02-06 PROCEDURE — 76010000174 HC OR TIME 3.5 TO 4 HR INTENSV-TIER 1: Performed by: STUDENT IN AN ORGANIZED HEALTH CARE EDUCATION/TRAINING PROGRAM

## 2021-02-06 PROCEDURE — 77030002916 HC SUT ETHLN J&J -A: Performed by: STUDENT IN AN ORGANIZED HEALTH CARE EDUCATION/TRAINING PROGRAM

## 2021-02-06 PROCEDURE — 74011250636 HC RX REV CODE- 250/636: Performed by: ANESTHESIOLOGY

## 2021-02-06 PROCEDURE — 77030035236 HC SUT PDS STRATFX BARB J&J -B: Performed by: STUDENT IN AN ORGANIZED HEALTH CARE EDUCATION/TRAINING PROGRAM

## 2021-02-06 PROCEDURE — 77030002933 HC SUT MCRYL J&J -A: Performed by: STUDENT IN AN ORGANIZED HEALTH CARE EDUCATION/TRAINING PROGRAM

## 2021-02-06 PROCEDURE — 85610 PROTHROMBIN TIME: CPT

## 2021-02-06 PROCEDURE — 76210000017 HC OR PH I REC 1.5 TO 2 HR: Performed by: STUDENT IN AN ORGANIZED HEALTH CARE EDUCATION/TRAINING PROGRAM

## 2021-02-06 PROCEDURE — C1776 JOINT DEVICE (IMPLANTABLE): HCPCS | Performed by: STUDENT IN AN ORGANIZED HEALTH CARE EDUCATION/TRAINING PROGRAM

## 2021-02-06 PROCEDURE — 74011250636 HC RX REV CODE- 250/636: Performed by: INTERNAL MEDICINE

## 2021-02-06 PROCEDURE — 74011250637 HC RX REV CODE- 250/637: Performed by: INTERNAL MEDICINE

## 2021-02-06 PROCEDURE — 74011250636 HC RX REV CODE- 250/636: Performed by: NURSE ANESTHETIST, CERTIFIED REGISTERED

## 2021-02-06 PROCEDURE — 36415 COLL VENOUS BLD VENIPUNCTURE: CPT

## 2021-02-06 PROCEDURE — 77030018547 HC SUT ETHBND1 J&J -B: Performed by: STUDENT IN AN ORGANIZED HEALTH CARE EDUCATION/TRAINING PROGRAM

## 2021-02-06 PROCEDURE — 77030008975 HC BN CANC CHP LIFV -E: Performed by: STUDENT IN AN ORGANIZED HEALTH CARE EDUCATION/TRAINING PROGRAM

## 2021-02-06 PROCEDURE — 77030013079 HC BLNKT BAIR HGGR 3M -A: Performed by: ANESTHESIOLOGY

## 2021-02-06 PROCEDURE — 74011000250 HC RX REV CODE- 250: Performed by: INTERNAL MEDICINE

## 2021-02-06 PROCEDURE — 77030008684 HC TU ET CUF COVD -B: Performed by: ANESTHESIOLOGY

## 2021-02-06 PROCEDURE — 74011000250 HC RX REV CODE- 250: Performed by: NURSE ANESTHETIST, CERTIFIED REGISTERED

## 2021-02-06 PROCEDURE — 73501 X-RAY EXAM HIP UNI 1 VIEW: CPT

## 2021-02-06 PROCEDURE — 72170 X-RAY EXAM OF PELVIS: CPT

## 2021-02-06 PROCEDURE — 77030005513 HC CATH URETH FOL11 MDII -B: Performed by: STUDENT IN AN ORGANIZED HEALTH CARE EDUCATION/TRAINING PROGRAM

## 2021-02-06 PROCEDURE — 77030036660

## 2021-02-06 PROCEDURE — 51798 US URINE CAPACITY MEASURE: CPT

## 2021-02-06 PROCEDURE — 76060000039 HC ANESTHESIA 4 TO 4.5 HR: Performed by: STUDENT IN AN ORGANIZED HEALTH CARE EDUCATION/TRAINING PROGRAM

## 2021-02-06 PROCEDURE — 74011250636 HC RX REV CODE- 250/636: Performed by: STUDENT IN AN ORGANIZED HEALTH CARE EDUCATION/TRAINING PROGRAM

## 2021-02-06 PROCEDURE — 65270000029 HC RM PRIVATE

## 2021-02-06 PROCEDURE — 81001 URINALYSIS AUTO W/SCOPE: CPT

## 2021-02-06 PROCEDURE — 74011000250 HC RX REV CODE- 250: Performed by: STUDENT IN AN ORGANIZED HEALTH CARE EDUCATION/TRAINING PROGRAM

## 2021-02-06 PROCEDURE — 77030006822 HC BLD SAW SAG BRSM -B: Performed by: STUDENT IN AN ORGANIZED HEALTH CARE EDUCATION/TRAINING PROGRAM

## 2021-02-06 PROCEDURE — 77030031139 HC SUT VCRL2 J&J -A: Performed by: STUDENT IN AN ORGANIZED HEALTH CARE EDUCATION/TRAINING PROGRAM

## 2021-02-06 PROCEDURE — 77030018673: Performed by: STUDENT IN AN ORGANIZED HEALTH CARE EDUCATION/TRAINING PROGRAM

## 2021-02-06 PROCEDURE — 77030026438 HC STYL ET INTUB CARD -A: Performed by: ANESTHESIOLOGY

## 2021-02-06 PROCEDURE — 74011250637 HC RX REV CODE- 250/637: Performed by: STUDENT IN AN ORGANIZED HEALTH CARE EDUCATION/TRAINING PROGRAM

## 2021-02-06 PROCEDURE — 77030011264 HC ELECTRD BLD EXT COVD -A: Performed by: STUDENT IN AN ORGANIZED HEALTH CARE EDUCATION/TRAINING PROGRAM

## 2021-02-06 PROCEDURE — 77030041397 HC DRSG PRIMASEAL AG MDII -B: Performed by: STUDENT IN AN ORGANIZED HEALTH CARE EDUCATION/TRAINING PROGRAM

## 2021-02-06 PROCEDURE — 0SR90JA REPLACEMENT OF RIGHT HIP JOINT WITH SYNTHETIC SUBSTITUTE, UNCEMENTED, OPEN APPROACH: ICD-10-PCS | Performed by: STUDENT IN AN ORGANIZED HEALTH CARE EDUCATION/TRAINING PROGRAM

## 2021-02-06 PROCEDURE — 2709999900 HC NON-CHARGEABLE SUPPLY: Performed by: STUDENT IN AN ORGANIZED HEALTH CARE EDUCATION/TRAINING PROGRAM

## 2021-02-06 PROCEDURE — 77030010507 HC ADH SKN DERMBND J&J -B: Performed by: STUDENT IN AN ORGANIZED HEALTH CARE EDUCATION/TRAINING PROGRAM

## 2021-02-06 PROCEDURE — 74011250637 HC RX REV CODE- 250/637: Performed by: PHYSICIAN ASSISTANT

## 2021-02-06 PROCEDURE — P9045 ALBUMIN (HUMAN), 5%, 250 ML: HCPCS | Performed by: NURSE ANESTHETIST, CERTIFIED REGISTERED

## 2021-02-06 PROCEDURE — 2709999900 HC NON-CHARGEABLE SUPPLY

## 2021-02-06 PROCEDURE — 77030020788: Performed by: STUDENT IN AN ORGANIZED HEALTH CARE EDUCATION/TRAINING PROGRAM

## 2021-02-06 DEVICE — ACTIS DUOFIX HIP PROSTHESIS (FEMORAL STEM 12/14 TAPER CEMENTLESS SIZE 3 HIGH COLLAR)  CE
Type: IMPLANTABLE DEVICE | Site: HIP | Status: FUNCTIONAL
Brand: ACTIS

## 2021-02-06 DEVICE — PINNACLE GRIPTION ACETABULAR SHELL MULTI-HOLE 54MM OD
Type: IMPLANTABLE DEVICE | Site: HIP | Status: FUNCTIONAL
Brand: PINNACLE GRIPTION

## 2021-02-06 DEVICE — SCR ACET CANC PINN 6.5X15MM SS --: Type: IMPLANTABLE DEVICE | Site: HIP | Status: FUNCTIONAL

## 2021-02-06 DEVICE — PINNACLE HIP SOLUTIONS ALTRX POLYETHYLENE ACETABULAR LINER +4 NEUTRAL 36MM ID 54MM OD
Type: IMPLANTABLE DEVICE | Site: HIP | Status: FUNCTIONAL
Brand: PINNACLE ALTRX

## 2021-02-06 DEVICE — M-SPEC METAL FEMORAL HEAD 12/14 TAPER DIAMETER 36MM +8.5: Type: IMPLANTABLE DEVICE | Site: HIP | Status: FUNCTIONAL

## 2021-02-06 DEVICE — BONE CHIP CANC CRSH 1-8MM 40ML -- PCAN1/2 PRESERVON: Type: IMPLANTABLE DEVICE | Site: HIP | Status: FUNCTIONAL

## 2021-02-06 DEVICE — HIP H1 TOT STD COCR HD IMPL CAPPED SYNTHES: Type: IMPLANTABLE DEVICE | Site: HIP | Status: FUNCTIONAL

## 2021-02-06 RX ORDER — SODIUM CHLORIDE 0.9 % (FLUSH) 0.9 %
5-40 SYRINGE (ML) INJECTION EVERY 8 HOURS
Status: DISCONTINUED | OUTPATIENT
Start: 2021-02-06 | End: 2021-02-06

## 2021-02-06 RX ORDER — ALBUMIN HUMAN 50 G/1000ML
SOLUTION INTRAVENOUS AS NEEDED
Status: DISCONTINUED | OUTPATIENT
Start: 2021-02-06 | End: 2021-02-06 | Stop reason: HOSPADM

## 2021-02-06 RX ORDER — HYDROMORPHONE HYDROCHLORIDE 1 MG/ML
0.2 INJECTION, SOLUTION INTRAMUSCULAR; INTRAVENOUS; SUBCUTANEOUS
Status: DISCONTINUED | OUTPATIENT
Start: 2021-02-06 | End: 2021-02-06

## 2021-02-06 RX ORDER — SODIUM CHLORIDE 0.9 % (FLUSH) 0.9 %
5-40 SYRINGE (ML) INJECTION AS NEEDED
Status: DISCONTINUED | OUTPATIENT
Start: 2021-02-06 | End: 2021-02-06

## 2021-02-06 RX ORDER — DIPHENHYDRAMINE HYDROCHLORIDE 50 MG/ML
12.5 INJECTION, SOLUTION INTRAMUSCULAR; INTRAVENOUS AS NEEDED
Status: DISCONTINUED | OUTPATIENT
Start: 2021-02-06 | End: 2021-02-06

## 2021-02-06 RX ORDER — SODIUM CHLORIDE 0.9 % (FLUSH) 0.9 %
5-40 SYRINGE (ML) INJECTION EVERY 8 HOURS
Status: DISCONTINUED | OUTPATIENT
Start: 2021-02-06 | End: 2021-02-17 | Stop reason: HOSPADM

## 2021-02-06 RX ORDER — FERROUS SULFATE, DRIED 160(50) MG
1 TABLET, EXTENDED RELEASE ORAL
Status: DISCONTINUED | OUTPATIENT
Start: 2021-02-07 | End: 2021-02-17 | Stop reason: HOSPADM

## 2021-02-06 RX ORDER — MORPHINE SULFATE 10 MG/ML
2 INJECTION, SOLUTION INTRAMUSCULAR; INTRAVENOUS
Status: DISCONTINUED | OUTPATIENT
Start: 2021-02-06 | End: 2021-02-06

## 2021-02-06 RX ORDER — NEOSTIGMINE METHYLSULFATE 1 MG/ML
INJECTION, SOLUTION INTRAVENOUS AS NEEDED
Status: DISCONTINUED | OUTPATIENT
Start: 2021-02-06 | End: 2021-02-06 | Stop reason: HOSPADM

## 2021-02-06 RX ORDER — SODIUM CHLORIDE 9 MG/ML
125 INJECTION, SOLUTION INTRAVENOUS CONTINUOUS
Status: DISPENSED | OUTPATIENT
Start: 2021-02-06 | End: 2021-02-07

## 2021-02-06 RX ORDER — ENOXAPARIN SODIUM 100 MG/ML
30 INJECTION SUBCUTANEOUS EVERY 12 HOURS
Status: DISCONTINUED | OUTPATIENT
Start: 2021-02-06 | End: 2021-02-17 | Stop reason: HOSPADM

## 2021-02-06 RX ORDER — SODIUM CHLORIDE 0.9 % (FLUSH) 0.9 %
5-40 SYRINGE (ML) INJECTION AS NEEDED
Status: DISCONTINUED | OUTPATIENT
Start: 2021-02-06 | End: 2021-02-17 | Stop reason: HOSPADM

## 2021-02-06 RX ORDER — DEXMEDETOMIDINE HYDROCHLORIDE 100 UG/ML
INJECTION, SOLUTION INTRAVENOUS AS NEEDED
Status: DISCONTINUED | OUTPATIENT
Start: 2021-02-06 | End: 2021-02-06 | Stop reason: HOSPADM

## 2021-02-06 RX ORDER — FACIAL-BODY WIPES
10 EACH TOPICAL DAILY PRN
Status: DISCONTINUED | OUTPATIENT
Start: 2021-02-08 | End: 2021-02-17 | Stop reason: HOSPADM

## 2021-02-06 RX ORDER — GLYCOPYRROLATE 0.2 MG/ML
INJECTION INTRAMUSCULAR; INTRAVENOUS AS NEEDED
Status: DISCONTINUED | OUTPATIENT
Start: 2021-02-06 | End: 2021-02-06 | Stop reason: HOSPADM

## 2021-02-06 RX ORDER — POLYETHYLENE GLYCOL 3350 17 G/17G
17 POWDER, FOR SOLUTION ORAL DAILY
Status: DISCONTINUED | OUTPATIENT
Start: 2021-02-07 | End: 2021-02-17 | Stop reason: HOSPADM

## 2021-02-06 RX ORDER — SODIUM CHLORIDE 9 MG/ML
25 INJECTION, SOLUTION INTRAVENOUS CONTINUOUS
Status: DISCONTINUED | OUTPATIENT
Start: 2021-02-06 | End: 2021-02-06

## 2021-02-06 RX ORDER — ONDANSETRON 2 MG/ML
INJECTION INTRAMUSCULAR; INTRAVENOUS AS NEEDED
Status: DISCONTINUED | OUTPATIENT
Start: 2021-02-06 | End: 2021-02-06 | Stop reason: HOSPADM

## 2021-02-06 RX ORDER — NALOXONE HYDROCHLORIDE 0.4 MG/ML
0.4 INJECTION, SOLUTION INTRAMUSCULAR; INTRAVENOUS; SUBCUTANEOUS AS NEEDED
Status: DISCONTINUED | OUTPATIENT
Start: 2021-02-06 | End: 2021-02-17 | Stop reason: HOSPADM

## 2021-02-06 RX ORDER — MIDAZOLAM HYDROCHLORIDE 1 MG/ML
1 INJECTION, SOLUTION INTRAMUSCULAR; INTRAVENOUS AS NEEDED
Status: DISCONTINUED | OUTPATIENT
Start: 2021-02-06 | End: 2021-02-09 | Stop reason: HOSPADM

## 2021-02-06 RX ORDER — SODIUM CHLORIDE, SODIUM LACTATE, POTASSIUM CHLORIDE, CALCIUM CHLORIDE 600; 310; 30; 20 MG/100ML; MG/100ML; MG/100ML; MG/100ML
75 INJECTION, SOLUTION INTRAVENOUS CONTINUOUS
Status: DISCONTINUED | OUTPATIENT
Start: 2021-02-06 | End: 2021-02-06

## 2021-02-06 RX ORDER — SUCCINYLCHOLINE CHLORIDE 20 MG/ML
INJECTION INTRAMUSCULAR; INTRAVENOUS AS NEEDED
Status: DISCONTINUED | OUTPATIENT
Start: 2021-02-06 | End: 2021-02-06 | Stop reason: HOSPADM

## 2021-02-06 RX ORDER — FENTANYL CITRATE 50 UG/ML
INJECTION, SOLUTION INTRAMUSCULAR; INTRAVENOUS AS NEEDED
Status: DISCONTINUED | OUTPATIENT
Start: 2021-02-06 | End: 2021-02-06 | Stop reason: HOSPADM

## 2021-02-06 RX ORDER — SODIUM CHLORIDE, SODIUM LACTATE, POTASSIUM CHLORIDE, CALCIUM CHLORIDE 600; 310; 30; 20 MG/100ML; MG/100ML; MG/100ML; MG/100ML
125 INJECTION, SOLUTION INTRAVENOUS CONTINUOUS
Status: DISPENSED | OUTPATIENT
Start: 2021-02-06 | End: 2021-02-07

## 2021-02-06 RX ORDER — ESCITALOPRAM OXALATE 10 MG/1
10 TABLET ORAL DAILY
Status: DISCONTINUED | OUTPATIENT
Start: 2021-02-07 | End: 2021-02-11

## 2021-02-06 RX ORDER — ACETAMINOPHEN 325 MG/1
650 TABLET ORAL
Status: DISCONTINUED | OUTPATIENT
Start: 2021-02-06 | End: 2021-02-17 | Stop reason: HOSPADM

## 2021-02-06 RX ORDER — TRANEXAMIC ACID 100 MG/ML
INJECTION, SOLUTION INTRAVENOUS AS NEEDED
Status: DISCONTINUED | OUTPATIENT
Start: 2021-02-06 | End: 2021-02-06 | Stop reason: HOSPADM

## 2021-02-06 RX ORDER — ACETAMINOPHEN 325 MG/1
650 TABLET ORAL EVERY 6 HOURS
Status: DISCONTINUED | OUTPATIENT
Start: 2021-02-06 | End: 2021-02-06

## 2021-02-06 RX ORDER — FENTANYL CITRATE 50 UG/ML
25 INJECTION, SOLUTION INTRAMUSCULAR; INTRAVENOUS
Status: DISCONTINUED | OUTPATIENT
Start: 2021-02-06 | End: 2021-02-06

## 2021-02-06 RX ORDER — FENTANYL CITRATE 50 UG/ML
50 INJECTION, SOLUTION INTRAMUSCULAR; INTRAVENOUS AS NEEDED
Status: DISCONTINUED | OUTPATIENT
Start: 2021-02-06 | End: 2021-02-09 | Stop reason: HOSPADM

## 2021-02-06 RX ORDER — PROPOFOL 10 MG/ML
INJECTION, EMULSION INTRAVENOUS AS NEEDED
Status: DISCONTINUED | OUTPATIENT
Start: 2021-02-06 | End: 2021-02-06 | Stop reason: HOSPADM

## 2021-02-06 RX ORDER — OXYCODONE HYDROCHLORIDE 5 MG/1
2.5 TABLET ORAL
Status: DISCONTINUED | OUTPATIENT
Start: 2021-02-06 | End: 2021-02-17 | Stop reason: HOSPADM

## 2021-02-06 RX ORDER — SODIUM CHLORIDE 9 MG/ML
75 INJECTION, SOLUTION INTRAVENOUS CONTINUOUS
Status: DISCONTINUED | OUTPATIENT
Start: 2021-02-06 | End: 2021-02-10

## 2021-02-06 RX ORDER — ROCURONIUM BROMIDE 10 MG/ML
INJECTION, SOLUTION INTRAVENOUS AS NEEDED
Status: DISCONTINUED | OUTPATIENT
Start: 2021-02-06 | End: 2021-02-06 | Stop reason: HOSPADM

## 2021-02-06 RX ORDER — MIDAZOLAM HYDROCHLORIDE 1 MG/ML
0.5 INJECTION, SOLUTION INTRAMUSCULAR; INTRAVENOUS
Status: DISCONTINUED | OUTPATIENT
Start: 2021-02-06 | End: 2021-02-06

## 2021-02-06 RX ORDER — LIDOCAINE HYDROCHLORIDE 10 MG/ML
0.1 INJECTION, SOLUTION EPIDURAL; INFILTRATION; INTRACAUDAL; PERINEURAL AS NEEDED
Status: DISCONTINUED | OUTPATIENT
Start: 2021-02-06 | End: 2021-02-09 | Stop reason: HOSPADM

## 2021-02-06 RX ORDER — FAMOTIDINE 20 MG/1
40 TABLET, FILM COATED ORAL
Status: DISCONTINUED | OUTPATIENT
Start: 2021-02-06 | End: 2021-02-07

## 2021-02-06 RX ORDER — HYDROCODONE BITARTRATE AND ACETAMINOPHEN 5; 325 MG/1; MG/1
1 TABLET ORAL AS NEEDED
Status: DISCONTINUED | OUTPATIENT
Start: 2021-02-06 | End: 2021-02-06

## 2021-02-06 RX ORDER — SODIUM CHLORIDE 9 MG/ML
100 INJECTION, SOLUTION INTRAVENOUS CONTINUOUS
Status: DISPENSED | OUTPATIENT
Start: 2021-02-06 | End: 2021-02-08

## 2021-02-06 RX ORDER — SODIUM CHLORIDE 9 MG/ML
100 INJECTION, SOLUTION INTRAVENOUS CONTINUOUS
Status: DISCONTINUED | OUTPATIENT
Start: 2021-02-06 | End: 2021-02-06

## 2021-02-06 RX ORDER — ONDANSETRON 2 MG/ML
4 INJECTION INTRAMUSCULAR; INTRAVENOUS AS NEEDED
Status: DISCONTINUED | OUTPATIENT
Start: 2021-02-06 | End: 2021-02-06

## 2021-02-06 RX ORDER — FACIAL-BODY WIPES
10 EACH TOPICAL
Status: DISCONTINUED | OUTPATIENT
Start: 2021-02-06 | End: 2021-02-09 | Stop reason: SDUPTHER

## 2021-02-06 RX ORDER — AMOXICILLIN 250 MG
1 CAPSULE ORAL 2 TIMES DAILY
Status: DISCONTINUED | OUTPATIENT
Start: 2021-02-06 | End: 2021-02-17 | Stop reason: HOSPADM

## 2021-02-06 RX ORDER — CEFAZOLIN SODIUM 1 G/3ML
INJECTION, POWDER, FOR SOLUTION INTRAMUSCULAR; INTRAVENOUS AS NEEDED
Status: DISCONTINUED | OUTPATIENT
Start: 2021-02-06 | End: 2021-02-06 | Stop reason: HOSPADM

## 2021-02-06 RX ORDER — SODIUM CHLORIDE 9 MG/ML
50 INJECTION, SOLUTION INTRAVENOUS CONTINUOUS
Status: DISPENSED | OUTPATIENT
Start: 2021-02-06 | End: 2021-02-07

## 2021-02-06 RX ADMIN — DEXMEDETOMIDINE HYDROCHLORIDE 10 MCG: 100 INJECTION, SOLUTION, CONCENTRATE INTRAVENOUS at 11:37

## 2021-02-06 RX ADMIN — SODIUM CHLORIDE 125 ML/HR: 9 INJECTION, SOLUTION INTRAVENOUS at 22:10

## 2021-02-06 RX ADMIN — DOCUSATE SODIUM 50 MG AND SENNOSIDES 8.6 MG 1 TABLET: 8.6; 5 TABLET, FILM COATED ORAL at 17:09

## 2021-02-06 RX ADMIN — GLYCOPYRROLATE 0.4 MG: 0.2 INJECTION, SOLUTION INTRAMUSCULAR; INTRAVENOUS at 11:35

## 2021-02-06 RX ADMIN — CEFAZOLIN 2 G: 330 INJECTION, POWDER, FOR SOLUTION INTRAMUSCULAR; INTRAVENOUS at 08:25

## 2021-02-06 RX ADMIN — PROPOFOL 60 MG: 10 INJECTION, EMULSION INTRAVENOUS at 08:06

## 2021-02-06 RX ADMIN — ONDANSETRON HYDROCHLORIDE 4 MG: 2 INJECTION, SOLUTION INTRAMUSCULAR; INTRAVENOUS at 08:11

## 2021-02-06 RX ADMIN — FENTANYL CITRATE 25 MCG: 50 INJECTION, SOLUTION INTRAMUSCULAR; INTRAVENOUS at 09:03

## 2021-02-06 RX ADMIN — SODIUM CHLORIDE 150 ML/HR: 9 INJECTION, SOLUTION INTRAVENOUS at 22:18

## 2021-02-06 RX ADMIN — ROCURONIUM BROMIDE 5 MG: 10 SOLUTION INTRAVENOUS at 08:06

## 2021-02-06 RX ADMIN — ALBUMIN (HUMAN) 250 ML: 12.5 INJECTION, SOLUTION INTRAVENOUS at 09:51

## 2021-02-06 RX ADMIN — Medication 10 ML: at 17:19

## 2021-02-06 RX ADMIN — PHENYLEPHRINE HYDROCHLORIDE 20 MCG/MIN: 10 INJECTION INTRAVENOUS at 08:03

## 2021-02-06 RX ADMIN — FENTANYL CITRATE 25 MCG: 50 INJECTION, SOLUTION INTRAMUSCULAR; INTRAVENOUS at 09:38

## 2021-02-06 RX ADMIN — SODIUM CHLORIDE 100 ML/HR: 9 INJECTION, SOLUTION INTRAVENOUS at 12:52

## 2021-02-06 RX ADMIN — Medication 2 MG: at 11:35

## 2021-02-06 RX ADMIN — FENTANYL CITRATE 25 MCG: 50 INJECTION, SOLUTION INTRAMUSCULAR; INTRAVENOUS at 08:06

## 2021-02-06 RX ADMIN — SODIUM CHLORIDE, POTASSIUM CHLORIDE, SODIUM LACTATE AND CALCIUM CHLORIDE: 600; 310; 30; 20 INJECTION, SOLUTION INTRAVENOUS at 07:46

## 2021-02-06 RX ADMIN — CEFAZOLIN SODIUM 2 G: 1 INJECTION, POWDER, FOR SOLUTION INTRAMUSCULAR; INTRAVENOUS at 17:13

## 2021-02-06 RX ADMIN — ROCURONIUM BROMIDE 10 MG: 10 SOLUTION INTRAVENOUS at 10:17

## 2021-02-06 RX ADMIN — SODIUM CHLORIDE 100 ML/HR: 9 INJECTION, SOLUTION INTRAVENOUS at 07:20

## 2021-02-06 RX ADMIN — SUCCINYLCHOLINE CHLORIDE 60 MG: 20 INJECTION, SOLUTION INTRAMUSCULAR; INTRAVENOUS at 08:07

## 2021-02-06 RX ADMIN — ACETAMINOPHEN 650 MG: 325 TABLET ORAL at 04:49

## 2021-02-06 RX ADMIN — ACETAMINOPHEN 650 MG: 325 TABLET ORAL at 17:09

## 2021-02-06 RX ADMIN — ROCURONIUM BROMIDE 25 MG: 10 SOLUTION INTRAVENOUS at 08:11

## 2021-02-06 RX ADMIN — ENOXAPARIN SODIUM 30 MG: 30 INJECTION SUBCUTANEOUS at 17:27

## 2021-02-06 RX ADMIN — ACETAMINOPHEN 650 MG: 325 TABLET ORAL at 22:36

## 2021-02-06 NOTE — PROGRESS NOTES
Primary Nurse Marci Silva and Shabbir Hsu, RN, RN performed a dual skin assessment on this patient No impairment noted  Ricardo score is 17  Patient has bruises in upper extremities, lower extremities and some skin moles on the left side of the body.

## 2021-02-06 NOTE — PROGRESS NOTES
Bedside and Verbal shift change report given to Blaire Pierre RN (oncoming nurse) by Gloria Resendez RN (offgoing nurse). Report included the following information SBAR, Kardex, Intake/Output and MAR.

## 2021-02-06 NOTE — H&P
[address]  HISTORY AND PHYSICAL    Name:  Fred Green  MR#:  [id number]  :  [id number]  ACCOUNT #:  [de-identified]  ADMIT DATE:  2021    CHIEF COMPLAINT:  A 26-year-old white female admitted with right hip fracture. HISTORY OF PRESENT ILLNESS:  The patient has probable mild dementia. In 2020, she fell and fractured her right hip and underwent surgery. Yesterday, she fell again and came to the ER where new right hip fracture was identified. She was seen by Orthopedics. In the ER, she had some mild agitation, which responded to Haldol. She is admitted now for further evaluation and treatment. PAST MEDICAL HISTORY:  1. GERD. 2.  Probable mild dementia. 3.  Lumpectomy and radiation for breast carcinoma. PAST SURGICAL HISTORY:  Breast surgery, lumbar kyphoplasty, right hip fracture in 2020. ADVERSE DRUG REACTIONS:  CIPRO - YEAST VAGINITIS, CODEINE - VOMITING, HYDROCODONE - ESOPHAGITIS,  MORPHINE - NAUSEA, OXYCODONE - ESOPHAGITIS. MEDICATIONS:  1. Tylenol 650 mg p.o. q.4 hours p.r.n.  2.  Dulcolax 10 mg per rectum daily p.r.n.  3.  Os-Zachariah plus D 500/200 one tablet b.i.d.  4.  Pepcid 40 mg nightly. 5.  Lasix 20 mg daily p.r.n. edema. SOCIAL HISTORY:  Negative EtOH. Former smoker. Full code status if there is chance for meaningful recovery per daughter with whom I spoke on the phone     FAMILY HISTORY:  Mother - hypertension, heart disease, breast carcinoma. Father - prostate carcinoma. REVIEW OF SYSTEMS:  She denies headache, dizziness, shortness of breath, cough, fever, chest pain. Bowels are regular without hematochezia. She denies urinary difficulty. .    PHYSICAL EXAMINATION:  VITAL SIGNS:  Temperature 98, pulse 84, /74, RR 20, O2 sat 96%. HEENT:  Negative. LUNGS:  Clear. HEART:  Regular without murmur, gallop, or rub. ABDOMEN:  BS positive. Soft. No mass felt. No HSM. Nontender.   EXTREMITIES:  Right proximal thigh and right hip, mild tenderness. Right leg externally rotated and foreshortened. NEUROLOGIC:  Awake, alert, and oriented to self, to location, says the year is 2020, with prompting corrected 2021, January or February. Remainder of neuro exam is nonfocal.    LABORATORY DATA:  Sed rate 32. White count 8.3, 77% neutrophils, 7% lymphocytes, and 16% monocytes. Hemoglobin 10.7, hematocrit 33.7, platelet count 726. C-reactive protein 6.25, elevated. Sodium 134. BUN and creatinine ratio 25. A:G ratio 0.9. Remainder of CMP is normal.  Pro-time 11.4, INR 1.1. EKG showed nonspecific ST-T-wave abnormality. When compared with the EKG of November 2020,  sinus rhythm has replaced Afib;  ST elevation not present in inferior leads, nonspecific T-wave abnormality no longer in the inferior leads, nonspecific T-wave abnormality improved in anterolateral leads. Chest x-ray; increased hazy opacification,  right lower lobe, may be related to atelectasis or airspace disease. Right hip; marked foreshortened proximal femur at the femoral neck since surgery in 11/2020, intact femoral neck screws which now project outside the articular cortex of the right femoral head, lateral displacement of the femoral head itself with this change from superior valgus positioning in surgery to the inferior varus positioning on this examination, and widening of the inferior joint space, new compression or impaction of the femoral neck fracture site with significant new cephalad migration of the distal femoral fracture fragment, stable right pubic fracture. CT pelvis, kyphoplasty changes at L4 or right parasymphyseal fracture, slight irregularity of the right sacrum suggesting an insufficiency fracture, three screws outside the fracture of the femoral neck which is foreshortened.   Screws into the anterior-superior aspect of the femoral head, femoral head is subluxed laterally, suspicion of intraarticular foreign bodies, suspicion of fracture of the lateral edges of acetabulum. Right femur x-ray; fracture of the femoral neck. No additional fracture seen. Urinalysis pending     IMPRESSION:  1. Ground level fall with right femoral neck fracture. 2.  In 11/2020, fall with right hip fracture, surgery. 3.  Mild dementia. 4.  Gastroesophageal reflux disease, treated with Pepcid. 5. Paroxysmal Afib in the past. In NSR now     PLAN:  Full inpatient admission. Analgesia. Orthopedic consultation. Anticipate surgery tomorrow morning. The risk for surgery should be acceptable, I spoke with the patient's daughter by telephone. The patient is admitted for Dr. Rosa Costa, for whom I am on-call.       Janas Schilder, MD      [id number]  D:  02/05/2021 19:30  T:  02/05/2021 23:13  JOB #:  [id number]

## 2021-02-06 NOTE — PROGRESS NOTES
Pt's daughter called:  Prince Pina (daughter) [phone] who is @ home to shower and she will return to the hospital.

## 2021-02-06 NOTE — PROGRESS NOTES
Medical Progress Note      NAME: Dirk Edwards   :  3/7/1928  MRM:  [id number]    Date/Time: 2021           Problem List:     Active Problems:    GERD (gastroesophageal reflux disease) ()      Hip fracture ([facility] [address] (2020)      Mild dementia ([address] (2021)             Subjective:     PCP cross cover     Quiet night. Hx Dementia , mild confusion. O/w no new c/o's     Past Medical History:   Diagnosis Date    Cancer [facility])     radiation and lumpectony    GERD (gastroesophageal reflux disease)     Mild dementia ([address] 2021            Objective:         Vitals:      Last 24hrs VS reviewed since prior progress note. Most recent are:    Visit Vitals  BP (!) 165/72 (BP 1 Location: Right upper arm, BP Patient Position: At rest)   Pulse 96   Temp 98.7 °F (37.1 °C)   Resp 18   SpO2 95%     SpO2 Readings from Last 6 Encounters:   21 95%   20 94%   19 98%            Intake/Output Summary (Last 24 hours) at 2021 7544  Last data filed at 2021 0430  Gross per 24 hour   Intake    Output 700 ml   Net -700 ml                  Exam:      General:  Alert, cooperative, no distress, appears stated age. Lungs:   Clear to auscultation bilaterally. Heart:  Regular rate and rhythm, S1, S2 normal, no murmur, click, rub or gallop. +ectopy    Abdomen:   Soft, non-tender. Bowel sounds normal. No masses,  No organomegaly. Extremities: Rt thigh expected mild tenderness  Rt leg foreshortened and externally rotated   No edema.   Rt foot NVI     A and O to self, not year or location   O/w grossly non focal      Lab Data Reviewed: (see below)  Recent Results (from the past 24 hour(s))   EKG, 12 LEAD, INITIAL    Collection Time: 21  5:27 PM   Result Value Ref Range    Ventricular Rate 95 BPM    Atrial Rate 95 BPM    P-R Interval 116 ms    QRS Duration 68 ms    Q-T Interval 354 ms    QTC Calculation (Bezet) 444 ms    Calculated P Axis 87 degrees    Calculated R Axis 39 degrees    Calculated T Axis 56 degrees    Diagnosis       Normal sinus rhythm  Nonspecific ST and T wave abnormality  When compared with ECG of 13-NOV-2020 17:14,  Sinus rhythm has replaced Atrial fibrillation  ST elevation now present in Inferior leads  Nonspecific T wave abnormality no longer evident in Inferior leads  Nonspecific T wave abnormality, improved in Anterolateral leads  QT has shortened     CBC WITH AUTOMATED DIFF    Collection Time: 02/05/21  5:33 PM   Result Value Ref Range    WBC 8.3 3.6 - 11.0 K/uL    RBC 3.82 3.80 - 5.20 M/uL    HGB 10.7 (L) 11.5 - 16.0 g/dL    HCT 33.7 (L) 35.0 - 47.0 %    MCV 88.2 80.0 - 99.0 FL    MCH 28.0 26.0 - 34.0 PG    MCHC 31.8 30.0 - 36.5 g/dL    RDW 15.9 (H) 11.5 - 14.5 %    PLATELET 206 141 - 341 K/uL    MPV 10.4 8.9 - 12.9 FL    NRBC 0.0 0  WBC    ABSOLUTE NRBC 0.00 0.00 - 0.01 K/uL    NEUTROPHILS 77 (H) 32 - 75 %    LYMPHOCYTES 7 (L) 12 - 49 %    MONOCYTES 16 (H) 5 - 13 %    EOSINOPHILS 0 0 - 7 %    BASOPHILS 0 0 - 1 %    IMMATURE GRANULOCYTES 0 0.0 - 0.5 %    ABS. NEUTROPHILS 6.4 1.8 - 8.0 K/UL    ABS. LYMPHOCYTES 0.6 (L) 0.8 - 3.5 K/UL    ABS. MONOCYTES 1.3 (H) 0.0 - 1.0 K/UL    ABS. EOSINOPHILS 0.0 0.0 - 0.4 K/UL    ABS. BASOPHILS 0.0 0.0 - 0.1 K/UL    ABS. IMM. GRANS. 0.0 0.00 - 0.04 K/UL    DF SMEAR SCANNED      RBC COMMENTS ANISOCYTOSIS  1+        RBC COMMENTS MAVERICK CELLS  PRESENT       METABOLIC PANEL, COMPREHENSIVE    Collection Time: 02/05/21  5:33 PM   Result Value Ref Range    Sodium 134 (L) 136 - 145 mmol/L    Potassium 4.4 3.5 - 5.1 mmol/L    Chloride 102 97 - 108 mmol/L    CO2 24 21 - 32 mmol/L    Anion gap 8 5 - 15 mmol/L    Glucose 100 65 - 100 mg/dL    BUN 20 6 - 20 MG/DL    Creatinine 0.79 0.55 - 1.02 MG/DL    BUN/Creatinine ratio 25 (H) 12 - 20      GFR est AA >60 >60 ml/min/1.73m2    GFR est non-AA >60 >60 ml/min/1.73m2    Calcium 9.0 8.5 - 10.1 MG/DL    Bilirubin, total 0.5 0.2 - 1.0 MG/DL    ALT (SGPT) 18 12 - 78 U/L    AST (SGOT) 31 15 - 37 U/L    Alk.  phosphatase 93 45 - 117 U/L    Protein, total 7.5 6.4 - 8.2 g/dL    Albumin 3.6 3.5 - 5.0 g/dL    Globulin 3.9 2.0 - 4.0 g/dL    A-G Ratio 0.9 (L) 1.1 - 2.2     TYPE & SCREEN    Collection Time: 02/05/21  5:33 PM   Result Value Ref Range    Crossmatch Expiration 02/08/2021,2359     ABO/Rh(D) A NEGATIVE     Antibody screen NEG    PROTHROMBIN TIME + INR    Collection Time: 02/05/21  5:33 PM   Result Value Ref Range    INR 1.1 0.9 - 1.1      Prothrombin time 11.4 (H) 9.0 - 11.1 sec   SARS-COV-2    Collection Time: 02/05/21  5:33 PM   Result Value Ref Range    SARS-CoV-2 Please find results under separate order     SED RATE (ESR)    Collection Time: 02/05/21  5:33 PM   Result Value Ref Range    Sed rate, automated 32 (H) 0 - 30 mm/hr   C REACTIVE PROTEIN, QT    Collection Time: 02/05/21  5:33 PM   Result Value Ref Range    C-Reactive protein 6.25 (H) 0.00 - 0.60 mg/dL   SAMPLES BEING HELD    Collection Time: 02/05/21  5:33 PM   Result Value Ref Range    SAMPLES BEING HELD 1 red     COMMENT        Add-on orders for these samples will be processed based on acceptable specimen integrity and analyte stability, which may vary by analyte.    COVID-19 RAPID TEST    Collection Time: 02/05/21  5:33 PM   Result Value Ref Range    Specimen source Nasopharyngeal      COVID-19 rapid test Not detected NOTD     PROTHROMBIN TIME + INR    Collection Time: 02/06/21  3:08 AM   Result Value Ref Range    INR 1.1 0.9 - 1.1      Prothrombin time 11.4 (H) 9.0 - 11.1 sec       Medications Reviewed: (see below)    ______________________________________________________________________    Medications:     Current Facility-Administered Medications   Medication Dose Route Frequency    0.9% sodium chloride infusion  100 mL/hr IntraVENous CONTINUOUS    acetaminophen (TYLENOL) tablet 650 mg  650 mg Oral Q6H    lidocaine 4 % patch 1 Patch  1 Patch TransDERmal Q24H    traMADoL (ULTRAM) tablet 50 mg  50 mg Oral Q6H PRN                   Assessment:     Rt Hip Fracture. Risk for surgery should be acceptable. Urinalysis pending . Mild Dementia     GERD under tx.    Patient Active Problem List   Diagnosis Code    GERD (gastroesophageal reflux disease) K21.9    Senile purpura ([facility] D69.2    Closed right hip fracture, initial encounter ([facility] S72.001A    Paroxysmal atrial fibrillation (HCC) I48.0    Hip fracture ([facility]) S72.009A    Osteopenia M85.80    Mild dementia ([facility] F03.90          Plan:     Spoke w/ dtr , here                                                        ___________________________________________________      Attending Physician: Jose Sexton MD

## 2021-02-06 NOTE — BRIEF OP NOTE
Brief Postoperative Note    Patient: Miguel Ángel Baez  YOB: 1928  MRN: [id number]    Date of Procedure: 2/6/2021     Pre-Op Diagnosis: right femoral neck fracture    Post-Op Diagnosis: Same as preoperative diagnosis. Procedure(s):  RIGHT HIP CONVERSION TO ARTHROPLASTY TOTAL ANTERIOR APPROACH    Surgeon(s):  Amalia Nova MD    Surgical Assistant: Surg Asst-1: Mulugeta Cazares    Anesthesia: General     Estimated Blood Loss (mL): 777    Complications: Bleeding and Other: Medial wall fx    Specimens: * No specimens in log *     Implants:   Implant Name Type Inv.  Item Serial No.  Lot No. LRB No. Used Action   SCR LILIANE KAREN 16 THRD 7.3X80 -- SS - SNA Screw SCR BNE KAREN 16 THRD 7.3X80 -- SS NA SYNTHES [place] NA Right 1 Explanted   SCR LILIANE KAREN 16 THRD 7.3X85 -- SS - SNA Screw SCR LILIANE KAREN 16 THRD 7.3X85 -- SS NA SYNTHES [place] NA Right 1 Explanted   STEM FEM SZ 3 L101MM 12/14 TAPR HI OFFSET HIP DUOFIX CLLRD - SNA  STEM FEM SZ 3 L101MM 12/14 TAPR HI OFFSET HIP DUOFIX CLLRD NA [facility] [employer] [facility] [id number] Right 1 Implanted   SHELL PINNCL 54MM MULHL GRIPTN - SNA  SHELL PINNCL 54MM MULHL GRIPTN NA [facility] [employer] [id number] Right 1 Implanted   CUP ACET [id number] 8 H HIP GRIPTION BANTAM SER ROBSON PRESSFIT - SNA  CUP ACET [id number] 8 H HIP GRIPTION BANTAM SER ROBSON PRESSFIT NA [facility] [employer] [facility] [id number] Right 1 Implanted   HEAD FEM [id number] +8.5MM OFFSET 12/14 TAPR HIP MTL M-SPEC - SNA  HEAD FEM [id number] +8.5MM OFFSET 12/14 TAPR HIP MTL M-SPEC NA [employer] [facility] [id number] Right 1 Implanted   LINER ACET OD54MM ID36MM +4MM OFFSET HIP POLYETH MTL ON - SNA  LINER ACET OD54MM ID36MM +4MM OFFSET HIP POLYETH MTL ON NA [facility] [employer] [facility] [id number] Right 1 Implanted   BONE CHIP CANC CRSH 1-8MM 40ML -- PCAN1/2 PRESERVON - [id number]  BONE CHIP [facility] CRS 1-8MM 40ML -- PCAN1/2 PRESERVON [id number] [facility] TISSUE BANK  Right 1 Implanted Drains: * No LDAs found *    Findings: R hip failed CRPP, medial wall deficit.      Electronically Signed by Sunny Hein MD on 2/6/2021 at 12:31 PM

## 2021-02-06 NOTE — PERIOP NOTES
Received report in [employer] from Kaylan Vázquez, [facility].   Kaylan Vázquez to initiate preop checklist.

## 2021-02-06 NOTE — CONSULTS
Consult    Patient: Kamari Rios MRN: [id number]  SSN: [id number]    YOB: 1928  Age: 80 y.o. Sex: female      Subjective:      Kamari Rios is a 80 y.o. female who is being seen for Right hip fx failure. 79 yo female, s/p CRPP R femoral neck fx. Patient has hx of AMX. Per daughter, doing well until yesterday, had an unwitnessed fall. Had immediate pain and discomfort to the right hip. Daughter state patient was unable to ambulate afterward, even with her walker. As she was having worsening pain, she was brought in to the ED for evaluation. Orthopaedics consulted for the fx. Past Medical History:   Diagnosis Date    Cancer ([address]     radiation and lumpectony    GERD (gastroesophageal reflux disease)      Past Surgical History:   Procedure Laterality Date    HX ORTHOPAEDIC      right hip    IR KYPHOPLASTY LUMBAR  8/19/2019    TX BREAST SURGERY PROCEDURE UNLISTED        Family History   Problem Relation Age of Onset    Hypertension Mother     Heart Disease Mother     Cancer Mother         breast    Cancer Father         prostate     Social History     Tobacco Use    Smoking status: Former Smoker    Smokeless tobacco: Never Used   Substance Use Topics    Alcohol use: No      Current Facility-Administered Medications   Medication Dose Route Frequency Provider Last Rate Last Admin    acetaminophen (TYLENOL) tablet 650 mg  650 mg Oral Q6H [facility], [facility], [address]        lidocaine 4 % patch 1 Patch  1 Patch TransDERmal Q24H [facility], [facility], [address]        traMADoL (ULTRAM) tablet 50 mg  50 mg Oral Q6H PRN QUINTIN Sebastian         Current Outpatient Medications   Medication Sig Dispense Refill    escitalopram oxalate (LEXAPRO) 10 mg tablet Take 1 Tab by mouth daily. 30 Tab 3    acetaminophen (TYLENOL) 325 mg tablet Take 2 Tabs by mouth every six (6) hours as needed for Pain.  100 Tab 0    bisacodyL (DULCOLAX) 10 mg supp Insert 10 mg into rectum daily as needed for Constipation (For unrelieved constipation. ). 5 Suppository 0    calcium-vitamin D (OS-CARLOS +D3) 500 mg-200 unit per tablet Take 1 Tab by mouth two (2) times daily (with meals). 60 Tab 5    furosemide (LASIX) 20 mg tablet 1 tab po qam prn LE edema 90 Tab 1    famotidine (PEPCID) 40 mg tablet Take 1 Tab by mouth nightly. 90 Tab 3        Allergies   Allergen Reactions    Ciprofloxacin Other (comments)     Bad yeast inf    Codeine Nausea and Vomiting    Hydrocodone Other (comments)     \"burning the esophagus to the point patient can't swallow per family and patient\"    Morphine Nausea Only    Oxycodone Other (comments)     \"burning of esophagus\"       Review of Systems:  Review of systems not obtained due to patient factors. Objective:     Vitals:    02/05/21 1441   BP: 133/74   Pulse: 84   Resp: 20   Temp: 98 °F (36.7 °C)   SpO2: 96%        Physical Exam:  Gen: A&O to name, follows basic commands  BUE's: SILT R/U/M. Motor intact R/U/M/AIN/PIN. Radial pulses intact. LLE: SILT DP/SP/Tib/Clifford/Saph. Motor intact EHL/FHL/GSc/TA. Toes WWP. RLE: Gross motor/sensation intact. RLE shortened compared to contralateral limb. Pain with logroll. Imaging:     X-ray AP Pelvis shows cutout and failure of previous CRPP fixation with early erosion into the anterior acetabulum    CT Pelvis: Fx failure with screw cut out noted. Medial/posterior wall bone integrity well maintained. Some erosion in the anterior lip of the acetabulum. Labs:   ESR: 32  CRP: 6.25  Assessment:     Hospital Problems  Date Reviewed: 2/5/2021          Codes Class Noted POA    Hip fracture [facility]) ICD-10-CM: W71.644C  ICD-9-CM: 820.8  11/14/2020 Yes              Plan:     -Had long discussion with patient's daughter about possible treatment options for the patient. At this time, they have elected to proceed with conversion to ROMAIN. -Reviewed elevated inflammatory labwork with the patient.  As patient is 24 s/p GLF and acute pain to the hip (was doing well before per the family), will treat as acute trauma from fall and hold off on aspiration. Advised family we will send intraop cx's, if positive, will need IV abx x 6 weeks minimum. Family agrees to this plan.  -Reviewed risk/benefits of procedure including bleeding, fracture, infection, hardware failure, hardware cutout, continued pain, neurovascular damage, leg length discrepancy. Family is aware and wishes to proceed forward  -To OR 2/6/21 for conversion R ROMAIN  -NWB RLE  -Hold DVT proph  -Pain control per primary team      Signed By: Talha Hurley MD     February 5, 2021

## 2021-02-06 NOTE — ANESTHESIA POSTPROCEDURE EVALUATION
Procedure(s):  RIGHT HIP CONVERSION TO ARTHROPLASTY TOTAL ANTERIOR APPROACH.     general    <BSHSIANPOST>    INITIAL Post-op Vital signs:   Vitals Value Taken Time   /49 02/06/21 1315   Temp 36.7 °C (98.1 °F) 02/06/21 1300   Pulse 86 02/06/21 1321   Resp 19 02/06/21 1321   SpO2 99 % 02/06/21 1321

## 2021-02-06 NOTE — PERIOP NOTES
TRANSFER - OUT REPORT:    Verbal report given to [facility] (name) on Phil Barriga  being transferred to [id number] (unit) for routine post - op       Report consisted of patients Situation, Background, Assessment and   Recommendations(SBAR). Time Pre op antibiotic given:0825  Anesthesia Stop time: 1200  Grant Present on Transfer to floor:Y  Order for Grant on Chart:Y  Discharge Prescriptions with Chart:N/A    Information from the following report(s) SBAR, OR Summary, Procedure Summary, Intake/Output and MAR was reviewed with the receiving nurse. Opportunity for questions and clarification was provided. Is the patient on 02? NO       L/Min RA       Other N/A    Is the patient on a monitor? NO    Is the nurse transporting with the patient? NO    Surgical Waiting Area notified of patient's transfer from PACU?  YES      The following personal items collected during your admission accompanied patient upon transfer:   Dental Appliance: Dental Appliances: None  Vision:    Hearing Aid:    Jewelry:    Clothing:    Other Valuables:    Valuables sent to safe:

## 2021-02-06 NOTE — ANESTHESIA PREPROCEDURE EVALUATION
Relevant Problems   No relevant active problems       Anesthetic History   No history of anesthetic complications            Review of Systems / Medical History  Patient summary reviewed, nursing notes reviewed and pertinent labs reviewed    Pulmonary  Within defined limits                 Neuro/Psych   Within defined limits           Cardiovascular  Within defined limits          Dysrhythmias : atrial fibrillation           GI/Hepatic/Renal  Within defined limits   GERD           Endo/Other  Within defined limits           Other Findings              Physical Exam    Airway  Mallampati: II  TM Distance: > 6 cm  Neck ROM: normal range of motion   Mouth opening: Normal     Cardiovascular  Regular rate and rhythm,  S1 and S2 normal,  no murmur, click, rub, or gallop  Rhythm: regular           Dental  No notable dental hx       Pulmonary  Breath sounds clear to auscultation               Abdominal  GI exam deferred       Other Findings            Anesthetic Plan    ASA: 3  Anesthesia type: general          Induction: Intravenous  Anesthetic plan and risks discussed with: Son / Daughter and Patient

## 2021-02-07 LAB
ANION GAP SERPL CALC-SCNC: 9 MMOL/L (ref 5–15)
BUN SERPL-MCNC: 26 MG/DL (ref 6–20)
BUN/CREAT SERPL: 25 (ref 12–20)
CALCIUM SERPL-MCNC: 7.3 MG/DL (ref 8.5–10.1)
CHLORIDE SERPL-SCNC: 109 MMOL/L (ref 97–108)
CO2 SERPL-SCNC: 20 MMOL/L (ref 21–32)
CREAT SERPL-MCNC: 1.06 MG/DL (ref 0.55–1.02)
ERYTHROCYTE [DISTWIDTH] IN BLOOD BY AUTOMATED COUNT: 15.7 % (ref 11.5–14.5)
GLUCOSE SERPL-MCNC: 99 MG/DL (ref 65–100)
HCT VFR BLD AUTO: 20.7 % (ref 35–47)
HGB BLD-MCNC: 6.5 G/DL (ref 11.5–16)
HISTORY CHECKED?,CKHIST: NORMAL
INR PPP: 1.1 (ref 0.9–1.1)
MCH RBC QN AUTO: 28.4 PG (ref 26–34)
MCHC RBC AUTO-ENTMCNC: 31.4 G/DL (ref 30–36.5)
MCV RBC AUTO: 90.4 FL (ref 80–99)
NRBC # BLD: 0 K/UL (ref 0–0.01)
NRBC BLD-RTO: 0 PER 100 WBC
PLATELET # BLD AUTO: 191 K/UL (ref 150–400)
PMV BLD AUTO: 10.3 FL (ref 8.9–12.9)
POTASSIUM SERPL-SCNC: 3.8 MMOL/L (ref 3.5–5.1)
PROTHROMBIN TIME: 11.2 SEC (ref 9–11.1)
RBC # BLD AUTO: 2.29 M/UL (ref 3.8–5.2)
SODIUM SERPL-SCNC: 138 MMOL/L (ref 136–145)
WBC # BLD AUTO: 11 K/UL (ref 3.6–11)

## 2021-02-07 PROCEDURE — 74011250637 HC RX REV CODE- 250/637: Performed by: STUDENT IN AN ORGANIZED HEALTH CARE EDUCATION/TRAINING PROGRAM

## 2021-02-07 PROCEDURE — 74011000250 HC RX REV CODE- 250: Performed by: INTERNAL MEDICINE

## 2021-02-07 PROCEDURE — 74011250636 HC RX REV CODE- 250/636: Performed by: INTERNAL MEDICINE

## 2021-02-07 PROCEDURE — 80048 BASIC METABOLIC PNL TOTAL CA: CPT

## 2021-02-07 PROCEDURE — 85610 PROTHROMBIN TIME: CPT

## 2021-02-07 PROCEDURE — 36430 TRANSFUSION BLD/BLD COMPNT: CPT

## 2021-02-07 PROCEDURE — 85027 COMPLETE CBC AUTOMATED: CPT

## 2021-02-07 PROCEDURE — 74011250636 HC RX REV CODE- 250/636: Performed by: STUDENT IN AN ORGANIZED HEALTH CARE EDUCATION/TRAINING PROGRAM

## 2021-02-07 PROCEDURE — 2709999900 HC NON-CHARGEABLE SUPPLY

## 2021-02-07 PROCEDURE — P9016 RBC LEUKOCYTES REDUCED: HCPCS

## 2021-02-07 PROCEDURE — 65270000029 HC RM PRIVATE

## 2021-02-07 PROCEDURE — 74011250637 HC RX REV CODE- 250/637: Performed by: INTERNAL MEDICINE

## 2021-02-07 PROCEDURE — 51798 US URINE CAPACITY MEASURE: CPT

## 2021-02-07 PROCEDURE — 74011000250 HC RX REV CODE- 250: Performed by: STUDENT IN AN ORGANIZED HEALTH CARE EDUCATION/TRAINING PROGRAM

## 2021-02-07 PROCEDURE — 36415 COLL VENOUS BLD VENIPUNCTURE: CPT

## 2021-02-07 RX ORDER — SODIUM CHLORIDE 9 MG/ML
250 INJECTION, SOLUTION INTRAVENOUS AS NEEDED
Status: DISCONTINUED | OUTPATIENT
Start: 2021-02-07 | End: 2021-02-17 | Stop reason: HOSPADM

## 2021-02-07 RX ORDER — FAMOTIDINE 20 MG/1
20 TABLET, FILM COATED ORAL
Status: DISCONTINUED | OUTPATIENT
Start: 2021-02-07 | End: 2021-02-17 | Stop reason: HOSPADM

## 2021-02-07 RX ADMIN — ACETAMINOPHEN 650 MG: 325 TABLET ORAL at 19:01

## 2021-02-07 RX ADMIN — CEFAZOLIN SODIUM 2 G: 1 INJECTION, POWDER, FOR SOLUTION INTRAMUSCULAR; INTRAVENOUS at 00:26

## 2021-02-07 RX ADMIN — ACETAMINOPHEN 650 MG: 325 TABLET ORAL at 13:04

## 2021-02-07 RX ADMIN — SODIUM CHLORIDE 150 ML/HR: 9 INJECTION, SOLUTION INTRAVENOUS at 00:57

## 2021-02-07 RX ADMIN — DOCUSATE SODIUM 50 MG AND SENNOSIDES 8.6 MG 1 TABLET: 8.6; 5 TABLET, FILM COATED ORAL at 17:14

## 2021-02-07 RX ADMIN — ESCITALOPRAM OXALATE 10 MG: 10 TABLET ORAL at 09:41

## 2021-02-07 RX ADMIN — Medication 1 TABLET: at 09:41

## 2021-02-07 RX ADMIN — POLYETHYLENE GLYCOL 3350 17 G: 17 POWDER, FOR SOLUTION ORAL at 09:41

## 2021-02-07 RX ADMIN — SODIUM CHLORIDE 125 ML/HR: 9 INJECTION, SOLUTION INTRAVENOUS at 06:57

## 2021-02-07 RX ADMIN — Medication 1 TABLET: at 17:14

## 2021-02-07 RX ADMIN — SODIUM CHLORIDE 125 ML/HR: 9 INJECTION, SOLUTION INTRAVENOUS at 03:28

## 2021-02-07 RX ADMIN — SODIUM CHLORIDE 125 ML/HR: 9 INJECTION, SOLUTION INTRAVENOUS at 00:47

## 2021-02-07 RX ADMIN — Medication 10 ML: at 22:00

## 2021-02-07 RX ADMIN — ENOXAPARIN SODIUM 30 MG: 30 INJECTION SUBCUTANEOUS at 03:28

## 2021-02-07 RX ADMIN — DOCUSATE SODIUM 50 MG AND SENNOSIDES 8.6 MG 1 TABLET: 8.6; 5 TABLET, FILM COATED ORAL at 09:41

## 2021-02-07 RX ADMIN — ENOXAPARIN SODIUM 30 MG: 30 INJECTION SUBCUTANEOUS at 15:06

## 2021-02-07 RX ADMIN — ACETAMINOPHEN 650 MG: 325 TABLET ORAL at 06:58

## 2021-02-07 RX ADMIN — Medication 1 TABLET: at 13:03

## 2021-02-07 RX ADMIN — SODIUM CHLORIDE 75 ML/HR: 900 INJECTION, SOLUTION INTRAVENOUS at 20:45

## 2021-02-07 NOTE — OP NOTES
[address]  OPERATIVE REPORT    Name:  Rodolfo Gamez  MR#:  [id number]  :  1928  ACCOUNT #:  [de-identified]  DATE OF SERVICE:  2021      PREOPERATIVE DIAGNOSIS:  Failed right closed reduction and percutaneous pinning right hip fracture. POSTOPERATIVE DIAGNOSIS:  Failed right closed reduction and percutaneous pinning right hip fracture. PROCEDURE PERFORMED:  Right total hip conversion arthroplasty. SURGEON:  Lazaro Dillard MD    ASSISTANT:  EDUARDA    ANESTHESIA:  General     COMPLICATIONS:  Bleeding and medial wall fracture/deficit. SPECIMENS REMOVED:  None. IMPLANTS:    Implant Name Type Inv. Item Serial No.  Lot No. LRB No. Used Action   SCR BNE KAREN 16 THRD 7.3X80 -- SS - SNA Screw SCR BNE KAREN 16 THRD 7.3X80 -- SS NA SYNTHES [place] NA Right 1 Explanted   SCR BNE KAREN 16 THRD 7.3X85 -- SS - SNA Screw SCR BNE KAREN 16 THRD 7.3X85 -- SS NA SYNTHES [place] NA Right 1 Explanted   CUP ACET [id number] 8 H HIP GRIPTION BANTAM SER ROBSON PRESSFIT - SNA  CUP ACET [id number] 8 H HIP GRIPTION BANTAM SER ROBSON PRESSFIT NA [facility] [id number] Right 1 Implanted and Explanted   SCREW BONE FEM CANC 6. [id number] - SNA  SCREW BONE FEM CANC 6. [id number] NA [employer] [id number] Right 1 Implanted and Explanted   LINER ACET OD48MM ID32MM +4MM OFFSET HIP POLYETH MTL ON - SNA  LINER ACET OD48MM ID32MM +4MM OFFSET HIP POLYETH MTL ON NA [facility] [employer] [id number] Right 1 Implanted and Explanted   SCREW BONE FEM CANC 6. [id number] - SNA  SCREW BONE FEM CANC 6. [id number] NA [employer] [facility] [id number] Right 1 Implanted and Explanted   STEM FEM SZ 3 L101MM 12/14 TAPR HI OFFSET HIP DUOFIX CLLRD - SNA  STEM FEM SZ 3 L101MM 12/14 TAPR HI OFFSET HIP DUOFIX CLLRD NA [facility] [id number] Right 1 Implanted   SHELL PINNCL 54MM MULHL GRIPTN - SNA  SHELL PINNCL 54MM MULHL GRIPTN NA [facility] [employer]_WD [id number] Right 1 Implanted   CUP ACET [id number] 8 H HIP GRIPTION BANTAM SER ROBSON PRESSFIT - SNA  CUP ACET [id number] 8 H HIP GRIPTION BANTAM SER ROBSON PRESSFIT NA [facility] [employer]_ [id number] Right 1 Implanted   HEAD FEM [id number] +8.5MM OFFSET 12/14 TAPR HIP MTL M-SPEC - SNA  HEAD FEM [id number] +8.5MM OFFSET 12/14 TAPR HIP MTL M-SPEC NA [employer]ES [employer]_WD [id number] Right 1 Implanted   LINER ACET OD54MM ID36MM +4MM OFFSET HIP POLYETH MTL ON - SNA  LINER ACET OD54MM ID36MM +4MM OFFSET HIP POLYETH MTL ON NA [facility] [employer]_ [id number] Right 1 Implanted   BONE CHIP CANC CRSH 1-8MM 40ML -- PCAN1/2 PRESERVON - [id number]  BONE CHIP CANC CRSH 1-8MM 40ML -- PCAN1/2 [employer]ERVON [id number] [facility]  Right 1 Implanted   SCR ACET CANC PINN 6.5X15MM SS --  - SNA  SCR ACET CANC PINN 6.5X15MM SS --  NA [facility] [employer] NA Right 1 Implanted   HIP H1 TOT STD COCR HD IMPL CAPPED SYNTHES - [id number]  HIP H1 TOT STD COCR HD IMPL CAPPED SYNTHES  [facility] [employer]_WD  Right 1 Implanted         ESTIMATED BLOOD LOSS:  400 mL. INDICATIONS FOR PROCEDURE:  The patient is a 70-year-old female with the medical history significant for altered mental status and dementia who was previously treated in 11/2020 for a right femoral neck fracture with closed reduction and percutaneous pinning by another surgeon. The patient, per the daughter, was doing quite well up until about 24 hours ago when she had a ground-level fall. The patient fell on to her right side, had immediate pain to the right hip with shortening and unable to walk. The patient's pain did not subsequently improve. She then presented to [facility] ED where x-ray examination at that time showed failed CRPP of the right femoral neck with cut-out of that fracture. The patient was admitted to the hospitalist for medical clearance.   We had a long discussion with the family as the patient does have altered mental status, unable to make her medical decisions. We elected to proceed with a total hip arthroplasty and conversion. The patient was cleared for the procedure and the patient was taken to the operating room. PROCEDURE IN DETAIL:  The patient's family was met in the preoperative holding area. All questions were answered. The patient was consented and marked for the procedure. The patient was taken to the operating room where preinduction time-out was held. The patient underwent endotracheal intubation by the Anesthesia team.  The patient was then moved on to the [place] table in the supine position and was locked into good position. The patient was then prepped and draped in usual sterile fashion. Next, a preinduction time-out was held. Next, a 5-cm incision was made at the previous incision lateral aspect of the femur from the CRPP. We were able to put the guidewires through each of the screws, however, only one was able to back out with other 2 spun freely in the femoral head and we were unable to back them out. Because of this we left these two guide pins intact and then completed the standard [place] approach to the anterior hip. Upon examination of the anterior capsule, found it to be extremely scarred, attenuated, and torn. This was secondary to either the previous screws cutting out or previous guidewires. This was subsequently removed with Bovie cautery and a rongeur. Once we were able to establish where the neck cut should be,  we made a neck cut using a sagittal saw, we cut it down to the screws. Once this was done, we removed bone around the screws using a half-inch straight osteotome. We were then able to better expose the remaining 2 screws, which were then backed out using a rongeur and a screwdriver. Once this was done, the neck cut was completed and the femoral head was removed using a Rey. Once this was done, we turned our attention to the acetabulum.   An anterior retractor was carefully placed and positioned in the anterior aspect of the acetabulum. We then placed a cobra retractor overlying the femoral neck underneath the acetabulum on the posterior and inferior portion to move down the femur. Once this was done, we subsequently removed labrum and residual pulvinar to expose the medial wall. We then decided to begin reaming. A 45 reamer was chosen. This was reamed lightly and subsequently medial wall bone collapsed upon reaming. We pulled the reamer out. There still appeared to be a significant medial wall left with just a small defect. We then moved to a 47 reamer and did light reaming, we further caused a medial wall defect. Once this was done, we halted reaming and attempted to place a size 48 cup. It was found to have a good fit but upon placement of the poly the cup moved. We then subsequently increased our reaming to get a better hemispherical cup fit. This was reamed to size 53 for a 54 cup. We then backfilled the medial wall defect with autograft reamings, bone from the previously removed femoral head, as well as cancellous bone chips. These were made into a putty and placed into the defect in the medial wall. Once this was done, we malleted in a 54 cup, which was found to have a good hemispherical fit. We were able to place one screw in the acetabulum, with a good purchase. We attempted to other screws in the multi hole cup, but both went the through the graft and were unable to achieve a proper purchase due to the medialization of the cup. Once this was done, a +4 poly was placed, it was found to be locked in position and cup was found to be stable upon pulling with the Tammie Frost. Once this was done, the wound was copiously irrigated. We turned our attention to the femur. The femur was externally rotated to 120 degrees and retractors were placed on the medial calcacar as well as the posterior greater trochanter to elevate the femur.   We did subsequent capsular and external rotator release including the piriformis but did not release the obturator externus. Once this was found to be adequately released,  all retractors were removed except the femoral hook, traction was released and we moved her leg down with adduction. Once this was done, we lowered the head of the bed some to better present the femur out of the wound. Once this was done the femoral hook was hooked up to the femoral jig and this pulled the femur out of the wound. Calcar and greater troch retractors were placed and residual capsule was removed. Once this was done, a canal finder was used to find the canal and then an opening broach was used. We were able to broach to a size 3 ACTIS stem which was found to be solid and did not move upon manipulation. We then trialed with a 1.5 head and a high offset stem. We then reduced the hip. It was noted to be significantly short about 8-10 mm of shortening secondary to our medialization of the cup, which we measured on intraop flouro. Because of this, it was decided that we would keep the stem as that was a good fit and fill, and re-trial with a 8.5 neck length. We then dislocated the hip and move back down to a lower and abducted position. We removed the broach and a real high-offset #3 ACTIS stem was placed and found to be in good position and stable upon positioning. Once this was done, we trialed with an 8.5 head. This was found to increase her length and offset to within 1-2 mm of the contralateral side. Once this was done, we dislocated the hip, exchanged the trial for a real metal 8.5 head, reduced the hip, it was found to be stable with external rotation to 90 degrees and minimal shuck. The wounds were then copiously irrigated. The residual capsule that did remain was closed, fascia was closed with Stratafix. The skin was closed with 0 Vicryl, 2-0 Vicryl, and then we closed with nylon.   We then turned our attention to the incision for the screw removal.  This was copiously irrigated, was closed with 2-0 Vicryl and 3-0 nylon. The wounds were cleaned and dressed with Aquacel. The patient was extubated and moved to the hospital bed with an abduction pillow between her legs. She was taken to PACU in stable condition. DISPOSITION:  The patient to be nonweightbearing to right lower extremity. DVT prophylaxis with Lovenox for 2 weeks, then transition to aspirin for 4 weeks. IV Ancef x24 hours postop. We will also keep the patient on Keflex for 7 days due to the length of procedure and due to patient's comorbidities. She will work with PT to practice nonweightbearing to the right lower extremity as the bone graft in the medial wall defect matures and heals. The patient will follow up in 2 weeks for a wound check.          Melba Montana MD      [id number]  D:  02/06/2021 12:41  T:  02/06/2021 19:07  JOB #:  [id number]

## 2021-02-07 NOTE — PROGRESS NOTES
Physical Therapy    Orders acknowledged, chart reviewed. Noted Hgb 6.5 with plan for transfusion x 2 units. Will defer PT evaluation at this time and continue to follow.     Giuliano Head, PT, MPT

## 2021-02-07 NOTE — PROGRESS NOTES
02/07/21 0031   Vital Signs   Temp 97.7 °F (36.5 °C)   Temp Source Oral   Pulse (Heart Rate) 100   Heart Rate Source Monitor   Resp Rate 20   O2 Sat (%) 96 %   Level of Consciousness Alert   BP (!) 92/51   MAP (Calculated) 65   BP 1 Method Automatic   BP 1 Location Left upper arm   BP Patient Position At rest   MEWS Score 2   Called the provider to inform him that the patient's BP was still low and that the   SBP was less than a 100,after running the fluids at 150 mL/hr for 2 hours. The provider ordered the fluids to be run at 150 mL/Hr for another 2 hours.   3:00 am- Fluids changed to 125 mL/ hr

## 2021-02-07 NOTE — PROGRESS NOTES
02/06/21 2053   Vital Signs   Temp 98.2 °F (36.8 °C)   Temp Source Oral   Pulse (Heart Rate) (!) 109   Heart Rate Source Monitor   Resp Rate 18   O2 Sat (%) 99 %   Level of Consciousness Alert   BP (!) 89/46   MAP (Calculated) (!) 60   BP 1 Method Automatic   BP 1 Location Left upper arm   BP Patient Position At rest   MEWS Score 3     Called the provider and notified him about the patient's low BP  2205:  The provider ordered to increase normal saline from 125 mL/hr to 150 mL/hr to run for 2 hrs and if the SBP > 100 to reduce it back to 125 mL/ hr.

## 2021-02-07 NOTE — ROUTINE PROCESS
Bedside shift change report given to [id number] (oncoming nurse) by Harper Bolaños RN (offgoing nurse). Report included the following information SBAR, Kardex, Intake/Output and MAR.

## 2021-02-07 NOTE — PROGRESS NOTES
Bedside and Verbal shift change report given to Rosemary Velázquez RN (oncoming nurse) by [facility], RN (offgoing nurse). Report included the following information SBAR, Kardex, Intake/Output and MAR.

## 2021-02-07 NOTE — PROGRESS NOTES
Occupational Therapy Note:     Pt admitted for right hip fracture. Today, pt's HgB is 6.5 and has orders for 2 units of blood. Will defer at this time and follow up later as able and appropriate.   Thanks,       Carola Ocampo, OT

## 2021-02-07 NOTE — PROGRESS NOTES
Progress Note    Patient: Mario Benavides MRN: [id number]  SSN: [id number]    YOB: 1928  Age: 80 y.o. Sex: female      Admit Date: 2/5/2021    LOS: 2 days     Subjective:     POD#1 s/p conversion R ROMAIN. Post op anemia with low BP overnight. Awaiting 2 units PRBCs. Patient's daughter at bedside, states difficulty with sleep overnight. Patient asleep this AM. Able to awaken. Patient c/o soreness to right hip, but states pain feels \"different' than her pre-op pain. Objective:     Vitals:    02/07/21 0235 02/07/21 0329 02/07/21 0703 02/07/21 0831   BP: (!) 90/51 (!) 96/55 (!) 93/49 (!) 89/46   Pulse: (!) 108 (!) 101 (!) 105 96   Resp: 20 20 18 18   Temp: 97.6 °F (36.4 °C) 98.5 °F (36.9 °C) 98.3 °F (36.8 °C) 97.9 °F (36.6 °C)   SpO2: 98% 96% 98% 96%   Weight: 58.1 kg (128 lb 1.6 oz)           Intake and Output:  Current Shift: 02/07 0701 - 02/07 1900  In: -   Out: 150 [Urine:150]  Last three shifts: 02/05 1901 - 02/07 0700  In: 1000 [I.V.:1000]  Out: 1625 [HBOHR:7556]    Physical Exam:   Gen: Alert to name, can follow basic commands  RLE: +EHL/FHL, can fire quads but difficulty with straight leg raise 2/2 pain. Toes WWP. Abduction pillow in place Dressings c/d/i, minimal strike through noted.      Lab/Data Review:  Hgb: 6.5 (awaiting 2 units PRBCs)    Assessment:   81 yo female s/p conversion R ROMAIN (DOS 2/6/21)     Plan:     -NWB RLE (ok for foot flat/foot on ground with a walker, no direct transfers on RLE)  -IV ancef x24 hrs post-op  -Keflex x7 days post-op for prophylaxis due to comorbidities  -PT/OT eval/treat  -Progress diet  -F/u Hgb  -CM for dispo planning  -Pain control per primary  -IS 10x/hr/day when awake      Signed By: Jagdeep Clarke MD     February 7, 2021

## 2021-02-07 NOTE — PROGRESS NOTES
Medical Progress Note      NAME: Sophy Montez   :  3/7/1928  MRM:  [id number]    Date/Time: 2021           Problem List:     Active Problems:    GERD (gastroesophageal reflux disease) ()      Hip fracture ([facility] (2020)      Mild dementia ([facility] (2021)             Subjective:     PCP XC     POD #1 s/p rt THR for fx. Generally comfortable. Hgb down to 6.5 ; denies dizziness, sob, or chest pain     Past Medical History:   Diagnosis Date    Atrial fibrillation (HCC)     Cancer (HCC)     radiation and lumpectony    GERD (gastroesophageal reflux disease)     Hearing impaired     Mild dementia ([facility] 2021    Paroxysmal atrial fibrillation ([facility]     a.fib 2010,    NS 2021            Objective:         Vitals:      Last 24hrs VS reviewed since prior progress note. Most recent are:    Visit Vitals  BP (!) 93/49 (BP 1 Location: Left upper arm, BP Patient Position: At rest)   Pulse (!) 105   Temp 98.3 °F (36.8 °C)   Resp 18   Wt 128 lb 1.6 oz (58.1 kg)   SpO2 98%   BMI 25.02 kg/m²     SpO2 Readings from Last 6 Encounters:   21 98%   20 94%   19 98%    O2 Flow Rate (L/min): 2 l/min       Intake/Output Summary (Last 24 hours) at 2021 2821  Last data filed at 2021 9202  Gross per 24 hour   Intake 1000 ml   Output 1075 ml   Net -75 ml                  Exam:      General:  Alert, cooperative, no distress, appears stated age. Lungs:   Clear to auscultation bilaterally. Heart:  Regular rate and rhythm, S1, S2 normal, no murmur, click, rub or gallop. Abdomen:   Soft, non-tender. Bowel sounds normal.   Extremities: Rt hip dressings, C/D/I   No pedal edema.     A and O person, [place] with prompting, and oriented to year        Lab Data Reviewed: (see below)  Recent Results (from the past 24 hour(s))   URINALYSIS W/MICROSCOPIC    Collection Time: 21  3:55 PM   Result Value Ref Range    Color YELLOW/STRAW      Appearance CLEAR CLEAR      Specific gravity 1.030 pH (UA) 5.0 5.0 - 8.0      Protein TRACE (A) NEG mg/dL    Glucose Negative NEG mg/dL    Ketone 80 (A) NEG mg/dL    Bilirubin Negative NEG      Blood TRACE (A) NEG      Urobilinogen 0.2 0.2 - 1.0 EU/dL    Nitrites Negative NEG      Leukocyte Esterase Negative NEG      WBC 0-4 0 - 4 /hpf    RBC 5-10 0 - 5 /hpf    Epithelial cells FEW FEW /lpf    Bacteria Negative NEG /hpf    Mucus TRACE (A) NEG /lpf    Hyaline cast 0-2 0 - 5 /lpf    Other: Renal Epithelial cells Present     URINE CULTURE HOLD SAMPLE    Collection Time: 02/06/21  3:55 PM    Specimen: Serum; Urine   Result Value Ref Range    Urine culture hold        Urine on hold in Microbiology dept for 2 days. If unpreserved urine is submitted, it cannot be used for addtional testing after 24 hours, recollection will be required.    PROTHROMBIN TIME + INR    Collection Time: 02/07/21  3:47 AM   Result Value Ref Range    INR 1.1 0.9 - 1.1      Prothrombin time 11.2 (H) 9.0 - 38.9 sec   METABOLIC PANEL, BASIC    Collection Time: 02/07/21  3:47 AM   Result Value Ref Range    Sodium 138 136 - 145 mmol/L    Potassium 3.8 3.5 - 5.1 mmol/L    Chloride 109 (H) 97 - 108 mmol/L    CO2 20 (L) 21 - 32 mmol/L    Anion gap 9 5 - 15 mmol/L    Glucose 99 65 - 100 mg/dL    BUN 26 (H) 6 - 20 MG/DL    Creatinine 1.06 (H) 0.55 - 1.02 MG/DL    BUN/Creatinine ratio 25 (H) 12 - 20      GFR est AA 59 (L) >60 ml/min/1.73m2    GFR est non-AA 48 (L) >60 ml/min/1.73m2    Calcium 7.3 (L) 8.5 - 10.1 MG/DL   CBC W/O DIFF    Collection Time: 02/07/21  3:47 AM   Result Value Ref Range    WBC 11.0 3.6 - 11.0 K/uL    RBC 2.29 (L) 3.80 - 5.20 M/uL    HGB 6.5 (L) 11.5 - 16.0 g/dL    HCT 20.7 (L) 35.0 - 47.0 %    MCV 90.4 80.0 - 99.0 FL    MCH 28.4 26.0 - 34.0 PG    MCHC 31.4 30.0 - 36.5 g/dL    RDW 15.7 (H) 11.5 - 14.5 %    PLATELET 288 470 - 879 K/uL    MPV 10.3 8.9 - 12.9 FL    NRBC 0.0 0  WBC    ABSOLUTE NRBC 0.00 0.00 - 0.01 K/uL       Medications Reviewed: (see below)    ______________________________________________________________________    Medications:     Current Facility-Administered Medications   Medication Dose Route Frequency    0.9% sodium chloride infusion 250 mL  250 mL IntraVENous PRN    acetaminophen (TYLENOL) tablet 650 mg  650 mg Oral Q6H PRN    bisacodyL (DULCOLAX) suppository 10 mg  10 mg Rectal DAILY PRN    famotidine (PEPCID) tablet 40 mg  40 mg Oral QHS    sodium chloride (NS) flush 5-40 mL  5-40 mL IntraVENous PRN    0.9% sodium chloride infusion  75 mL/hr IntraVENous CONTINUOUS    lactated Ringers infusion  125 mL/hr IntraVENous CONTINUOUS    0.9% sodium chloride infusion  50 mL/hr IntraVENous CONTINUOUS    lidocaine (PF) (XYLOCAINE) 10 mg/mL (1 %) injection 0.1 mL  0.1 mL SubCUTAneous PRN    fentaNYL citrate (PF) injection 50 mcg  50 mcg IntraVENous PRN    midazolam (VERSED) injection 1 mg  1 mg IntraVENous PRN    midazolam (VERSED) injection 1 mg  1 mg IntraVENous PRN    escitalopram oxalate (LEXAPRO) tablet 10 mg  10 mg Oral DAILY    0.9% sodium chloride infusion  125 mL/hr IntraVENous CONTINUOUS    sodium chloride (NS) flush 5-40 mL  5-40 mL IntraVENous Q8H    naloxone (NARCAN) injection 0.4 mg  0.4 mg IntraVENous PRN    calcium-vitamin D (OS-CARLOS +D3) 500 mg-200 unit per tablet 1 Tab  1 Tab Oral TID WITH MEALS    senna-docusate (PERICOLACE) 8.6-50 mg per tablet 1 Tab  1 Tab Oral BID    polyethylene glycol (MIRALAX) packet 17 g  17 g Oral DAILY    [START ON 2/8/2021] bisacodyL (DULCOLAX) suppository 10 mg  10 mg Rectal DAILY PRN    oxyCODONE IR (ROXICODONE) tablet 2.5 mg  2.5 mg Oral Q4H PRN    enoxaparin (LOVENOX) injection 30 mg  30 mg SubCUTAneous Q12H    0.9% sodium chloride infusion  150 mL/hr IntraVENous CONTINUOUS    acetaminophen (TYLENOL) tablet 650 mg  650 mg Oral Q6H    lidocaine 4 % patch 1 Patch  1 Patch TransDERmal Q24H    traMADoL (ULTRAM) tablet 50 mg  50 mg Oral Q6H PRN                   Assessment:   S/p Rt THR. PT as per Ortho. Post op anemia. With pt and her dtr(here), we discussed transfusion PRBC's -risks, benefits, and alternatives; they wish to proceed. Consent obtained for transfusion   F/u CBC ordered     Low normal bp likely related to post op anemia. follow    Mild Dementia.    Patient Active Problem List   Diagnosis Code    GERD (gastroesophageal reflux disease) K21.9    Senile purpura ([facility] D69.2    Closed right hip fracture, initial encounter ([facility] S72.001A    Paroxysmal atrial fibrillation ([facility]) I48.0    Hip fracture ([facility]) S72.009A    Osteopenia M85.80    [facility] F03.90          Plan:     As above.                                                        ___________________________________________________      Attending Physician: Clari Bourne MD

## 2021-02-08 LAB
ABO + RH BLD: NORMAL
ANION GAP SERPL CALC-SCNC: 7 MMOL/L (ref 5–15)
BLD PROD TYP BPU: NORMAL
BLD PROD TYP BPU: NORMAL
BLOOD GROUP ANTIBODIES SERPL: NORMAL
BPU ID: NORMAL
BPU ID: NORMAL
BUN SERPL-MCNC: 28 MG/DL (ref 6–20)
BUN/CREAT SERPL: 37 (ref 12–20)
CALCIUM SERPL-MCNC: 7.7 MG/DL (ref 8.5–10.1)
CHLORIDE SERPL-SCNC: 109 MMOL/L (ref 97–108)
CO2 SERPL-SCNC: 22 MMOL/L (ref 21–32)
CREAT SERPL-MCNC: 0.75 MG/DL (ref 0.55–1.02)
CROSSMATCH RESULT,%XM: NORMAL
CROSSMATCH RESULT,%XM: NORMAL
ERYTHROCYTE [DISTWIDTH] IN BLOOD BY AUTOMATED COUNT: 15.7 % (ref 11.5–14.5)
GLUCOSE SERPL-MCNC: 113 MG/DL (ref 65–100)
HCT VFR BLD AUTO: 26.1 % (ref 35–47)
HGB BLD-MCNC: 8.5 G/DL (ref 11.5–16)
INR PPP: 1.1 (ref 0.9–1.1)
MCH RBC QN AUTO: 28.4 PG (ref 26–34)
MCHC RBC AUTO-ENTMCNC: 32.6 G/DL (ref 30–36.5)
MCV RBC AUTO: 87.3 FL (ref 80–99)
NRBC # BLD: 0 K/UL (ref 0–0.01)
NRBC BLD-RTO: 0 PER 100 WBC
PLATELET # BLD AUTO: 200 K/UL (ref 150–400)
PMV BLD AUTO: 10.5 FL (ref 8.9–12.9)
POTASSIUM SERPL-SCNC: 4 MMOL/L (ref 3.5–5.1)
PROTHROMBIN TIME: 11.2 SEC (ref 9–11.1)
RBC # BLD AUTO: 2.99 M/UL (ref 3.8–5.2)
SODIUM SERPL-SCNC: 138 MMOL/L (ref 136–145)
SPECIMEN EXP DATE BLD: NORMAL
STATUS OF UNIT,%ST: NORMAL
STATUS OF UNIT,%ST: NORMAL
UNIT DIVISION, %UDIV: 0
UNIT DIVISION, %UDIV: 0
WBC # BLD AUTO: 11.4 K/UL (ref 3.6–11)

## 2021-02-08 PROCEDURE — 65270000029 HC RM PRIVATE

## 2021-02-08 PROCEDURE — 74011250636 HC RX REV CODE- 250/636: Performed by: STUDENT IN AN ORGANIZED HEALTH CARE EDUCATION/TRAINING PROGRAM

## 2021-02-08 PROCEDURE — 85610 PROTHROMBIN TIME: CPT

## 2021-02-08 PROCEDURE — 74011250637 HC RX REV CODE- 250/637: Performed by: INTERNAL MEDICINE

## 2021-02-08 PROCEDURE — 36415 COLL VENOUS BLD VENIPUNCTURE: CPT

## 2021-02-08 PROCEDURE — 77030038269 HC DRN EXT URIN PURWCK BARD -A

## 2021-02-08 PROCEDURE — 97161 PT EVAL LOW COMPLEX 20 MIN: CPT | Performed by: PHYSICAL THERAPIST

## 2021-02-08 PROCEDURE — 97165 OT EVAL LOW COMPLEX 30 MIN: CPT

## 2021-02-08 PROCEDURE — 97535 SELF CARE MNGMENT TRAINING: CPT

## 2021-02-08 PROCEDURE — 74011250637 HC RX REV CODE- 250/637: Performed by: STUDENT IN AN ORGANIZED HEALTH CARE EDUCATION/TRAINING PROGRAM

## 2021-02-08 PROCEDURE — 51798 US URINE CAPACITY MEASURE: CPT

## 2021-02-08 PROCEDURE — 97530 THERAPEUTIC ACTIVITIES: CPT | Performed by: PHYSICAL THERAPIST

## 2021-02-08 PROCEDURE — 80048 BASIC METABOLIC PNL TOTAL CA: CPT

## 2021-02-08 PROCEDURE — 85027 COMPLETE CBC AUTOMATED: CPT

## 2021-02-08 PROCEDURE — 74011000250 HC RX REV CODE- 250: Performed by: INTERNAL MEDICINE

## 2021-02-08 RX ORDER — CEPHALEXIN 250 MG/1
500 CAPSULE ORAL EVERY 12 HOURS
Status: DISCONTINUED | OUTPATIENT
Start: 2021-02-08 | End: 2021-02-09

## 2021-02-08 RX ADMIN — Medication 1 TABLET: at 17:56

## 2021-02-08 RX ADMIN — CEPHALEXIN 500 MG: 250 CAPSULE ORAL at 13:49

## 2021-02-08 RX ADMIN — ENOXAPARIN SODIUM 30 MG: 30 INJECTION SUBCUTANEOUS at 03:25

## 2021-02-08 RX ADMIN — CEPHALEXIN 500 MG: 250 CAPSULE ORAL at 22:17

## 2021-02-08 RX ADMIN — ACETAMINOPHEN 650 MG: 325 TABLET ORAL at 03:24

## 2021-02-08 RX ADMIN — ACETAMINOPHEN 650 MG: 325 TABLET ORAL at 09:30

## 2021-02-08 RX ADMIN — ENOXAPARIN SODIUM 30 MG: 30 INJECTION SUBCUTANEOUS at 13:50

## 2021-02-08 RX ADMIN — DOCUSATE SODIUM 50 MG AND SENNOSIDES 8.6 MG 1 TABLET: 8.6; 5 TABLET, FILM COATED ORAL at 09:29

## 2021-02-08 RX ADMIN — Medication 1 TABLET: at 09:29

## 2021-02-08 RX ADMIN — Medication 10 ML: at 06:00

## 2021-02-08 RX ADMIN — ESCITALOPRAM OXALATE 10 MG: 10 TABLET ORAL at 09:29

## 2021-02-08 NOTE — PROGRESS NOTES
Bedside shift change report given to Zach Nava (oncoming nurse) by Clara Cardona (offgoing nurse). Report included the following information SBAR, Kardex, Intake/Output and MAR.

## 2021-02-08 NOTE — PROGRESS NOTES
Physical Therapy  PT evaluation complete and full note to follow. Patient currently needing maxA-totalA x 2 to come to EOB. Family hopeful to take her home but may have a hard time managing her NWB status. Recommend  PT with 24/7 assist vs SNF rehab pending progress and family wishes.   [place], PT, DPT

## 2021-02-08 NOTE — ROUTINE PROCESS
Grant cath was dc'd at 11 am and patient has not voided. Bladder scan showed 68cc on 1900 and 197cc on 2300. Dr. Star Galicia was called and continue to monitor pt.

## 2021-02-08 NOTE — PROGRESS NOTES
Problem: Self Care Deficits Care Plan (Adult)  Goal: *Acute Goals and Plan of Care (Insert Text)  Description: FUNCTIONAL STATUS PRIOR TO ADMISSION: Patient was ambulatory with rollator walker at baseline. Pt was mod I to supervision level for ADLs with assistance required for bathing. Has history of falls. HOME SUPPORT PRIOR TO ADMISSION: The patient lived alone with family across the street to provide assistance. Occupational Therapy Goals  Initiated 2/8/2021  1. Patient will perform grooming, seated EOB unsupported, with supervision/set-up within 7 day(s). 2.  Patient will perform upper body dressing with supervision/set-up within 7 day(s). 3.  Patient will perform anterior bathing from neck to thighs, seated EOB, with minimal assistance within 7 day(s). 4.  Patient will perform toilet transfers to [facility] with maximal assistance within 7 day(s). 5.  Patient will participate in upper extremity therapeutic exercise/activities with minimal assistance/contact guard assist for 5 minutes within 7 day(s). Outcome: Progressing Towards Goal     OCCUPATIONAL THERAPY EVALUATION  Patient: Tyree Morgan (89 y.o. female)  Date: 2/8/2021  Primary Diagnosis: Hip fracture ([facility] [address] [S72.009A]  Procedure(s) (LRB):  RIGHT HIP CONVERSION TO ARTHROPLASTY TOTAL ANTERIOR APPROACH (Right) 2 Days Post-Op   Precautions:  Fall, NWB    ASSESSMENT  Based on the objective data described below, the patient presents with decreased activity tolerance, generally decreased strength, impaired balance, confusion, and decreased ADL performance and mobility following admission for GLF resulting in R hip fx. Pt is now POD 2 s/p conversion R ROMAIN and received 2 units of blood POD 1, now cleared to participate with therapy. Pt received in bed, very anxious and agitated regarding wedge pillow, supported dtr present. Pt also requires frequent redirection to task due to focus/perseveration on needing mouth wash and high pain levels.  She is able to sit EOB with A x 2 to perform simple grooming tasks, noted to demos L lateral lean to offload R hip. At this time, pt is unable to complete standing ADLs, ADL transfers, and requires increased assistance for all ADL tasks. Further complicated with NWB status and family reports goal to is take pt home at d/c. At this time, recommend SNF-level rehab but will continue to follow in acute setting to address above mentioned deficits and maximize highest level of functioning. Current Level of Function Impacting Discharge (ADLs/self-care): max/total A LB ADLs; min to max A for UB ADLs; A x 2 for all mobility    Functional Outcome Measure: The patient scored 15/100 on the Barthel outcome measure which is indicative of severe functional impairment. Other factors to consider for discharge: high fall risk; pain control; NWB R LE; supportive family; cognition     Patient will benefit from skilled therapy intervention to address the above noted impairments. PLAN :  Recommendations and Planned Interventions: self care training, functional mobility training, therapeutic exercise, balance training, therapeutic activities, endurance activities, patient education, home safety training and family training/education    Frequency/Duration: Patient will be followed by occupational therapy 5 times a week to address goals. Recommendation for discharge: (in order for the patient to meet his/her long term goals)  Therapy up to 5 days/week in SNF setting; of note, family want to take pt home at d/c. She would require 24/7 hands on physical assistance and additional DME with follow up [facility] therapy services.      This discharge recommendation:  Has not yet been discussed the attending provider and/or case management    IF patient discharges home will need the following DME: TBD - medical transport home, w/c, BS, 24/7 physical assistance, and TBD       SUBJECTIVE:   Patient stated I need to wash my mouth out.    OBJECTIVE DATA SUMMARY:   HISTORY:   Past Medical History:   Diagnosis Date    Atrial fibrillation ([facility] [address]     Cancer ([facility] [address]     radiation and lumpectony    GERD (gastroesophageal reflux disease)     Hearing impaired     Mild dementia ([facility] [address] 2/6/2021    Paroxysmal atrial fibrillation ([facility] [address]     a.fib 11/2010,    NS 02/2021     Past Surgical History:   Procedure Laterality Date    HX ORTHOPAEDIC      right hip    IR KYPHOPLASTY LUMBAR  8/19/2019    [place] BREAST SURGERY PROCEDURE UNLISTED         Expanded or extensive additional review of patient history:     Home Situation  Home Environment: Private residence  # Steps to Enter: 2  One/Two Story Residence: One story  Living Alone: Yes(family lives across the street)  Support Systems: Family member(s)  Patient Expects to be Discharged to[de-identified] Private residence  Current DME Used/Available at Home: Shower chair, Walker, rollator    Hand dominance: Right    EXAMINATION OF PERFORMANCE DEFICITS:  Cognitive/Behavioral Status:  Neurologic State: Alert;Confused  Orientation Level: Oriented to person  Cognition: Decreased attention/concentration; Follows commands;Memory loss;Poor safety awareness  Perception: Cues to maintain midline in sitting(pt leans to L to offload R hip)  Perseveration: Perseverates during ADLS;Perseverates during conversation(on wanting to \"wash her mouth out\")  Safety/Judgement: Decreased awareness of environment;Decreased awareness of need for assistance;Decreased awareness of need for safety;Decreased insight into deficits    Range of Motion:  AROM: Generally decreased, functional    Strength:  Strength: Generally decreased, functional    Coordination:  Fine Motor Skills-Upper: Left Impaired;Right Impaired(generally slowed; functional)    Gross Motor Skills-Upper: Left Intact; Right Intact    Balance:  Sitting: Intact  Standing: (not tested)    Functional Mobility and Transfers for ADLs:  Bed Mobility:  Supine to Sit: Total assistance;Assist x2  Sit to Supine: Total assistance;Assist x2  Scooting: Total assistance;Assist x2    Transfers:   Not assessed this session    ADL Assessment:  Patient recalled and demonstrated avoiding extreme planes of movement with Right LE during ADLs and functional mobility with verbal cues. Feeding: Setup;Minimum assistance    Oral Facial Hygiene/Grooming: Minimum assistance(in supported sitting)    Bathing: Maximum assistance; Total assistance    Upper Body Dressing: Maximum assistance    Lower Body Dressing: Total assistance    ADL Intervention and task modifications:    Grooming  Grooming Assistance: Minimum assistance  Position Performed: Seated edge of bed(with occasional support for balance)  Washing Face: Set-up  Brushing Teeth: Minimum assistance(and mouthwash use)  Cues: Physical assistance; Tactile cues provided;Verbal cues provided    Lower Body Dressing Assistance  Socks: Total assistance (dependent)  Leg Crossed Method Used: No  Position Performed: Supine  Cues: Don;Physical assistance    Cognitive Retraining  Safety/Judgement: Decreased awareness of environment;Decreased awareness of need for assistance;Decreased awareness of need for safety;Decreased insight into deficits    Functional Measure:  Barthel Index:    Bathin  Bladder: 5  Bowels: 5  Groomin  Dressin  Feedin  Mobility: 0  Stairs: 0  Toilet Use: 0  Transfer (Bed to Chair and Back): 0  Total: 15/100        The Barthel ADL Index: Guidelines  1. The index should be used as a record of what a patient does, not as a record of what a patient could do. 2. The main aim is to establish degree of independence from any help, physical or verbal, however minor and for whatever reason. 3. The need for supervision renders the patient not independent. 4. A patient's performance should be established using the best available evidence.  Asking the patient, friends/relatives and nurses are the usual sources, but direct observation and common sense are also important. However direct testing is not needed. 5. Usually the patient's performance over the preceding 24-48 hours is important, but occasionally longer periods will be relevant. 6. Middle categories imply that the patient supplies over 50 per cent of the effort. 7. Use of aids to be independent is allowed. Linzy Dom., Barthel, D.W. (2112). Functional evaluation: the Barthel Index. [address] [facility] (14)2. Jeffrey William sridevi IVAN Wilder, Memo Badillo., Deborah Collins., [place], [address] (1999). Measuring the change indisability after inpatient rehabilitation; comparison of the responsiveness of the Barthel Index and Functional Sampson Measure. Journal of Neurology, Neurosurgery, and Psychiatry, 66(4), 760-557. GLENYS Del Castillo, KRYSTLE Karimi, & Larry Garsia M.A. (2004.) Assessment of post-stroke quality of life in cost-effectiveness studies: The usefulness of the Barthel Index and the EuroQoL-5D. Quality of Life Research, 15, 204-64     Occupational Therapy Evaluation Charge Determination   History Examination Decision-Making   LOW Complexity : Brief history review  HIGH Complexity : 5 or more performance deficits relating to physical, cognitive , or psychosocial skils that result in activity limitations and / or participation restrictions HIGH Complexity : Patient presents with comorbidities that affect occupational performance.  Signifigant modification of tasks or assistance (eg, physical or verbal) with assessment (s) is necessary to enable patient to complete evaluation       Based on the above components, the patient evaluation is determined to be of the following complexity level: LOW   Pain Rating:  Pt reporting elevated pain in R LE/hip during activity    Activity Tolerance:   Fair and Poor    After treatment patient left in no apparent distress:    Supine in bed, Call bell within reach, Caregiver / family present and Side rails x 3    COMMUNICATION/EDUCATION:   The patients plan of care was discussed with: Physical therapist and Registered nurse. Home safety education was provided and the patient/caregiver indicated understanding., Patient/family have participated as able in goal setting and plan of care. and Patient/family agree to work toward stated goals and plan of care. This patients plan of care is appropriate for delegation to [facility] [place].     Thank you for this referral.  Lizzie Gan OT  Time Calculation: 29 mins

## 2021-02-08 NOTE — PROGRESS NOTES
Bedside and Verbal shift change report given to Dickson Rondon RN (oncoming nurse) by Lilian Kent RN (offgoing nurse). Report included the following information SBAR, Kardex, Procedure Summary, Intake/Output, MAR and Recent Results.

## 2021-02-08 NOTE — PROGRESS NOTES
Problem: Mobility Impaired (Adult and Pediatric)  Goal: *Acute Goals and Plan of Care (Insert Text)  Description: FUNCTIONAL STATUS PRIOR TO ADMISSION: Patient was ambulatory with rollator walker at baseline. Has history of falls. HOME SUPPORT PRIOR TO ADMISSION: The patient lived alone with family across the street to provide assistance. Physical Therapy Goals  Initiated 2/8/2021  1. Patient will move from supine to sit and sit to supine  in bed with maximal assistance within 7 day(s). 2.  Patient will transfer from bed to chair and chair to bed with maximal assistance x 2 using the least restrictive device within 7 day(s). 3.  Patient will perform sit to stand with maximal assistance x 2 within 7 day(s). 4.  Patient will complete HEP with modA within 7 days. Outcome: Not Progressing Towards Goal   PHYSICAL THERAPY EVALUATION  Patient: Marino Proctor (53 y.o. female)  Date: 2/8/2021  Primary Diagnosis: Hip fracture ([facility] [address] [S72.009A]  Procedure(s) (LRB):  RIGHT HIP CONVERSION TO ARTHROPLASTY TOTAL ANTERIOR APPROACH (Right) 2 Days Post-Op   Precautions:   Fall, NWB (per chart patient able to foot flat in standing but should not tx on the R)    ASSESSMENT  Based on the objective data described below, the patient presents with decreased functional mobility from baseline level of function. Patient very anxious this session and agitated regarding wedge pillow between legs. Also focused on cleaning out mouth with mouth wash and high pain levels reported. Comes to EOB with totalA x 2 and extra time to complete task. Sits EOB for task with Judy-S and unable to attempt any standing this session. NWB status will complicate DC as patient's family prefers to take her home at time of DC. Will work toward this goals but may ultimately need SNF rehab.       Other factors to consider for discharge: at risk for falls, pain limits mobility progression, NWB status may make it difficult for family to care for her at home     Patient will benefit from skilled therapy intervention to address the above noted impairments. PLAN :  Recommendations and Planned Interventions: bed mobility training, transfer training, gait training, therapeutic exercises, neuromuscular re-education, orthotic/prosthetic training, patient and family training/education, and therapeutic activities      Frequency/Duration: Patient will be followed by physical therapy:  daily to address goals. Recommendation for discharge: (in order for the patient to meet his/her long term goals)  Physical therapy at least 2 days/week in the home AND ensure assist and/or supervision for safety with all mobility (family prefers) Otherwise may need SNF        IF patient discharges home will need the following DME: to be determined (TBD)         SUBJECTIVE:   Patient stated I have never been in a worse predicament before.     OBJECTIVE DATA SUMMARY:   HISTORY:    Past Medical History:   Diagnosis Date    Atrial fibrillation ([address]     Cancer ([address]     radiation and lumpectony    GERD (gastroesophageal reflux disease)     Hearing impaired     Mild dementia ([address] 2/6/2021    Paroxysmal atrial fibrillation ([address]     a.fib 11/2010,    NS 02/2021     Past Surgical History:   Procedure Laterality Date    HX ORTHOPAEDIC      right hip    IR KYPHOPLASTY LUMBAR  8/19/2019    [place] BREAST SURGERY PROCEDURE UNLISTED         Personal factors and/or comorbidities impacting plan of care:     Home Situation  Home Environment: Private residence  # Steps to Enter: 2  One/Two Story Residence: One story  Living Alone: Yes(family lives across the street)  Support Systems: Family member(s)  Patient Expects to be Discharged to[de-identified] Private residence  Current DME Used/Available at Home: Shower chair, Walker, rollator    EXAMINATION/PRESENTATION/DECISION MAKING:   Critical Behavior:     Orientation Level: Oriented to person       Range Of Motion:  AROM: Generally decreased, functional Strength:    Strength: Generally decreased, functional          Functional Mobility:  Bed Mobility:     Supine to Sit: Total assistance;Assist x2  Sit to Supine: Total assistance;Assist x2  Scooting: Total assistance;Assist x2  Transfers:      Not tested                       Balance:   Sitting: Intact  Standing: (not tested)  Ambulation/Gait Training:                    Right Side Weight Bearing: Non-weight bearing          Pain Rating:  Reports pain but does not    Activity Tolerance:   Fair and requires frequent rest breaks    After treatment patient left in no apparent distress:   Supine in bed, Call bell within reach, Caregiver / family present, and Side rails x 3    COMMUNICATION/EDUCATION:   The patients plan of care was discussed with: Physical therapist, Occupational therapist, and Registered nurse. Fall prevention education was provided and the patient/caregiver indicated understanding., Patient/family have participated as able in goal setting and plan of care., Patient/family agree to work toward stated goals and plan of care. , and Patient is unable to participate in goal setting and plan of care.     Thank you for this referral.  Leon Griggs, PT, DPT   Time Calculation: 29 mins

## 2021-02-08 NOTE — PROGRESS NOTES
Miko Boyd, Joshua Narayanan and Dayanna    Admit Date: 2/5/2021      Subjective:     Patient quite sleepy this AM. Awake and verbal. ..       Current Facility-Administered Medications   Medication Dose Route Frequency    0.9% sodium chloride infusion 250 mL  250 mL IntraVENous PRN    famotidine (PEPCID) tablet 20 mg  20 mg Oral QHS    acetaminophen (TYLENOL) tablet 650 mg  650 mg Oral Q6H PRN    bisacodyL (DULCOLAX) suppository 10 mg  10 mg Rectal DAILY PRN    sodium chloride (NS) flush 5-40 mL  5-40 mL IntraVENous PRN    0.9% sodium chloride infusion  75 mL/hr IntraVENous CONTINUOUS    lidocaine (PF) (XYLOCAINE) 10 mg/mL (1 %) injection 0.1 mL  0.1 mL SubCUTAneous PRN    fentaNYL citrate (PF) injection 50 mcg  50 mcg IntraVENous PRN    midazolam (VERSED) injection 1 mg  1 mg IntraVENous PRN    midazolam (VERSED) injection 1 mg  1 mg IntraVENous PRN    escitalopram oxalate (LEXAPRO) tablet 10 mg  10 mg Oral DAILY    sodium chloride (NS) flush 5-40 mL  5-40 mL IntraVENous Q8H    naloxone (NARCAN) injection 0.4 mg  0.4 mg IntraVENous PRN    calcium-vitamin D (OS-CARLOS +D3) 500 mg-200 unit per tablet 1 Tab  1 Tab Oral TID WITH MEALS    senna-docusate (PERICOLACE) 8.6-50 mg per tablet 1 Tab  1 Tab Oral BID    polyethylene glycol (MIRALAX) packet 17 g  17 g Oral DAILY    bisacodyL (DULCOLAX) suppository 10 mg  10 mg Rectal DAILY PRN    oxyCODONE IR (ROXICODONE) tablet 2.5 mg  2.5 mg Oral Q4H PRN    enoxaparin (LOVENOX) injection 30 mg  30 mg SubCUTAneous Q12H    0.9% sodium chloride infusion  150 mL/hr IntraVENous CONTINUOUS    acetaminophen (TYLENOL) tablet 650 mg  650 mg Oral Q6H    lidocaine 4 % patch 1 Patch  1 Patch TransDERmal Q24H    traMADoL (ULTRAM) tablet 50 mg  50 mg Oral Q6H PRN          Objective:     Patient Vitals for the past 8 hrs:   BP Pulse Resp SpO2   02/08/21 0303 (!) 156/71 97 16 97 %     No intake/output data recorded.   02/06 1901 - 02/08 0700  In: -   Out: 400 [Urine:400]    Physical Exam: Lungs: clear to auscultation bilaterally  Heart: regular rate and rhythm, S1, S2 normal, no murmur, click, rub or gallop  Abdomen: soft, non-tender. Bowel sounds normal. No masses,  no organomegaly        Data Review   Recent Results (from the past 24 hour(s))   RBC, ALLOCATE    Collection Time: 02/07/21  7:30 AM   Result Value Ref Range    HISTORY CHECKED?  Historical check performed    PROTHROMBIN TIME + INR    Collection Time: 02/08/21  3:49 AM   Result Value Ref Range    INR 1.1 0.9 - 1.1      Prothrombin time 11.2 (H) 9.0 - 11.1 sec   CBC W/O DIFF    Collection Time: 02/08/21  3:49 AM   Result Value Ref Range    WBC 11.4 (H) 3.6 - 11.0 K/uL    RBC 2.99 (L) 3.80 - 5.20 M/uL    HGB 8.5 (L) 11.5 - 16.0 g/dL    HCT 26.1 (L) 35.0 - 47.0 %    MCV 87.3 80.0 - 99.0 FL    MCH 28.4 26.0 - 34.0 PG    MCHC 32.6 30.0 - 36.5 g/dL    RDW 15.7 (H) 11.5 - 14.5 %    PLATELET 831 811 - 380 K/uL    MPV 10.5 8.9 - 12.9 FL    NRBC 0.0 0  WBC    ABSOLUTE NRBC 0.00 0.00 - 3.09 K/uL   METABOLIC PANEL, BASIC    Collection Time: 02/08/21  3:49 AM   Result Value Ref Range    Sodium 138 136 - 145 mmol/L    Potassium 4.0 3.5 - 5.1 mmol/L    Chloride 109 (H) 97 - 108 mmol/L    CO2 22 21 - 32 mmol/L    Anion gap 7 5 - 15 mmol/L    Glucose 113 (H) 65 - 100 mg/dL    BUN 28 (H) 6 - 20 MG/DL    Creatinine 0.75 0.55 - 1.02 MG/DL    BUN/Creatinine ratio 37 (H) 12 - 20      GFR est AA >60 >60 ml/min/1.73m2    GFR est non-AA >60 >60 ml/min/1.73m2    Calcium 7.7 (L) 8.5 - 10.1 MG/DL           Assessment:     Active Problems:    GERD (gastroesophageal reflux disease) ()      Hip fracture (HCC) (11/14/2020)      Mild dementia (HCC) (2/6/2021)        Plan:     1) PT/OT today  2) Encourage PO

## 2021-02-08 NOTE — PROGRESS NOTES
Renal Dosing/Monitoring  Medication: Cephalexin    Current regimen:  500 mg  every Q6 hr  Recent Labs     02/08/21  0349 02/07/21  0347 02/05/21  1733   CREA 0.75 1.06* 0.79   BUN 28* 26* 20     Estimated CrCl:  ~ 35 ml/min  Plan: Change to 500 mg q12hr for CrCL between 30-60 ml/min   per renal dosing protocol

## 2021-02-08 NOTE — PROGRESS NOTES
Progress Note    Patient: Sophy Montez MRN: [id number]  SSN: [id number]    YOB: 1928  Age: 80 y.o. Sex: female      Admit Date: 2/5/2021    LOS: 3 days     Subjective:     POD#2 s/p conversion R ROMAIN. Patient asleep this AM. S/p 2 untis PRBCs. Did not work with PT yesterday 2/2 post-op anemia. Discussed WB status and d/c planning with daughter. Advised that NWB status will make d/c to home with home health very difficult. Daughter is aware. Objective:     Vitals:    02/07/21 1528 02/07/21 2033 02/08/21 0303 02/08/21 0821   BP:  120/64 (!) 156/71 (!) 144/70   Pulse:  96 97 76   Resp:  16 16 16   Temp:  97.8 °F (36.6 °C)  98.4 °F (36.9 °C)   SpO2:  94% 97% 96%   Weight:       Height: 5' (1.524 m)           Intake and Output:  Current Shift: No intake/output data recorded. Last three shifts: 02/06 1901 - 02/08 0700  In: -   Out: 400 [Urine:400]    Physical Exam:   Gen: Alert to name, can follow basic commands  RLE: +EHL/FHL, can fire quads but difficulty with straight leg raise 2/2 pain. Toes WWP. Abduction pillow in place Dressings c/d/i, minimal strike through noted.      Lab/Data Review:  Hgb:  8.5     Assessment:   81 yo female s/p conversion R ROMAIN (DOS 2/6/21)     Plan:     -NWB RLE (ok for foot flat/foot on ground with a walker, no direct transfers on RLE)  -IV ancef x24 hrs post-op (completed)  -Keflex x7 days post-op for prophylaxis due to comorbidities (will begin 2/8)  -PT/OT eval/treat: NWB RLE  -Progress diet  -CM for dispo planning  -Pain control per primary  -IS 10x/hr/day when awake      Signed By: Rena Avila MD     February 8, 2021

## 2021-02-08 NOTE — PROGRESS NOTES
Comprehensive Nutrition Assessment    Type and Reason for Visit: Consult    Nutrition Recommendations/Plan:   1. RD add Boost pudding (naif) BID; Daughter to bring in Boost drink (chocolate)  2. Verify weight  3. Offer prune juice PRN (routinely drinks at home for [facility])    Nutrition Assessment: Hx GERD, mild dementia; R-hip fx 11/14/2020 with surgical repair. Admit s/p slip GLF, R-hip fx: 2/6/21 Anterior approach conversion to arthroplasty R-hip. Pt claims, \"appetite not up to par\". Daughter claims appetite fine PTA however, fair post-op now. Eats carefully with GERD. Daughter assists to select menu now to avoid any potential food intolerances. Rx Pepcid now and PTA    RD offered ONS, but daughter claims she will only try Boost since that's what her  drank. Unwilling to try Ensure, house ONS. Daughter to bring in Boost drink for additional kcal/pro. Plan to add Boost pudding naif BID = 460 kcal and 14 gm pro meets 35% kcal and 20% pro needs respectively. Dislikes milk and eats very little yogurt. Claims \"gets Ca++ from [employer] vanilla ice cream\". #' Wt time of last admit 11/19/2020 = 115#. This admit wt ~128# and ? Accuracy bedscale, Do not believe has had recent wt gain. Plan to verify wt. Natural teeth with implants. Chew ok, no hx dysphagia, but with GERD occasional trouble ? swallow. Last BM 2/4, Rx Miralax and Pericolace; Drinks prune juice PTA for BM. Offered prune juice from floor stock. Daughter assisting with menu selections and tray set-up. Malnutrition Assessment:  Malnutrition Status: N/A    Estimated Daily Nutrient Needs:  Energy (kcal): 4793-0983; Weight Used for Energy Requirements:    Protein (g): 70 gm/day (1.2 gm/kg);  Weight Used for Protein Requirements: Admission  Fluid (ml/day): 1500 mL; Method Used for Fluid Requirements: 1 ml/kcal    Nutrition Related Findings:  Fair appetite; GERD      Wounds:    Surgical incision       Current Nutrition Therapies:  DIET REGULAR    Anthropometric Measures:  · Height:  5' (152.4 cm)  · Current Body Wt:  58.1 kg (128 lb 1.4 oz)   · Admission Body Wt:  128 lb 1.6 oz    · Usual Body Wt:  50.8 kg (112 lb)     · Ideal Body Wt:  100 lbs:  128.1 %   · Adjusted Body Weight: No adjustment   · BMI Category:  (? bedscale wt for accuracy)       Nutrition Diagnosis:   · Inadequate oral intake, Increased nutrient needs, Altered GI function related to acute injury/trauma, altered GI function, increased demand for energy/nutrients(;Geriatric surgery; post-op appetite) as evidenced by intake 26-50%, GI abnormality(Rx Pepcid; Avoid foods GERD irritants;  Age 80 post-op)      Nutrition Interventions:   Food and/or Nutrient Delivery: Continue current diet, add ONS  Nutrition Education and Counseling: Reviewed pro needs w/pt & daughter: surgical healing  Coordination of Nutrition Care:      Goals:  Consume min 50% all meals + 1-2 Boost pudding/day next 24 hr.        Nutrition Monitoring and Evaluation:   Behavioral-Environmental Outcomes:    Food/Nutrient Intake Outcomes: Food and nutrient intake, Supplement intake  Physical Signs/Symptoms Outcomes: GI status, Hemodynamic status, Meal time behavior, Weight    Discharge Planning:    Continue oral nutrition supplement     Electronically signed by Martín Duong RD on 2/8/2021 at 1:43 PM    Contact: Esther

## 2021-02-08 NOTE — PROGRESS NOTES
EDYTA: d/c home with family support 24/7 & home health- [facility]; [facility] & DME providers placed in AVS; Rolling Walker to be delivered by [employer] to pt room; BLS Transport(pt is NWB RLE); Will need IMM letter prior to d/c     RUR: 18%    Reason for Admission:   S/p right Total Hip arthroplasty                  RUR Score:     18%             PCP: First and Last name:  Dr. Kathie Terry   Name of Practice:    Are you a current patient: Yes/No: Y Approximate date of last visit: 6 months   Can you participate in a virtual visit if needed: YES    Do you (patient/family) have any concerns for transition/discharge? Daughter and pt deny concerns for d/c                 Plan for utilizing home health:   [facility]:  No amd on file             Transition of Care Plan:        1300-CM reviewed pt chart & met with pt at bedside as well as pt dtr, Lobo Malave. Pt resides alone in a one story home with one step to entrance. Prior to admission, daughter advised family would provide 24/7 assistance to pt as they live nearby, daughter and grand-daughter. Pt was open to [facility] prior to admission and dtr expressed that they would like pt to return home at d/c with [facility], and declined SNF. DME is: rollator, which is broken-dtr requests price for new one, grab bars and transfer chair. Pt does not have a RW; ordered obtained from PT as they advised pt will need this at d/c. Pt uses [facility] for meds with no copay concerns. 1600-CM uploaded [facility] orders in All scripts to [facility]. CM noted that [facility] has accepted for RW delivery. CM confirmed PCP and demographics. CM to follow for transitions of care.   Nelson Roca RN BSN CCM  Transition of Care Plan:     The Plan for Transition of Care is related to the following treatment goals: home health, dme  The Patient and/or patient representative was provided with a choice of provider and agrees  with the discharge plan. Yes [x] No []    A Freedom of choice list was provided with basic dialogue that supports the patient's individualized plan of care/goals and shares the quality data associated with the providers. Yes [x] No []  Care Management Interventions  PCP Verified by CM:  Yes  Palliative Care Criteria Met (RRAT>21 & CHF Dx)?: No  Mode of Transport at Discharge: S  Transition of Care Consult (CM Consult): Home Health, Discharge Planning, SNF  [address] No  Reason Outside [place]: Physician referred to specific agency  MyChart Signup: No  Discharge Durable Medical Equipment: Yes(RW ordered )  Health Maintenance Reviewed: Yes  Physical Therapy Consult: Yes  Occupational Therapy Consult: Yes  Current Support Network: Own Home, Lives Alone, Family Lives Nearby(family to provide support 24/7)  Confirm Follow Up Transport: Family  The Plan for Transition of Care is Related to the Following Treatment Goals : surgical and therapy clearance   The Patient and/or Patient Representative was Provided with a Choice of Provider and Agrees with the Discharge Plan?: Yes  Name of the Patient Representative Who was Provided with a Choice of Provider and Agrees with the Discharge Plan: patient   Freedom of Choice List was Provided with Basic Dialogue that Supports the Patient's Individualized Plan of Care/Goals, Treatment Preferences and Shares the Quality Data Associated with the Providers?: Yes  Discharge Location  Discharge Placement: Home with home health  Ashwin Valero RN BSN CCM

## 2021-02-08 NOTE — PROGRESS NOTES
Spoke to Dr. Carol Forbes in regards to low urine and unable to void. Pt was bladder scanned twice only 285 in bladder. Dr Carol Forbes aware increased fluids to 100cc/hr, labs ordered for am, continue to monitor, straight cath if greater then 400cc and unable to void.

## 2021-02-09 LAB
ANION GAP SERPL CALC-SCNC: 11 MMOL/L (ref 5–15)
BUN SERPL-MCNC: 18 MG/DL (ref 6–20)
BUN/CREAT SERPL: 40 (ref 12–20)
CALCIUM SERPL-MCNC: 8.2 MG/DL (ref 8.5–10.1)
CHLORIDE SERPL-SCNC: 106 MMOL/L (ref 97–108)
CO2 SERPL-SCNC: 21 MMOL/L (ref 21–32)
CREAT SERPL-MCNC: 0.45 MG/DL (ref 0.55–1.02)
ERYTHROCYTE [DISTWIDTH] IN BLOOD BY AUTOMATED COUNT: 15.9 % (ref 11.5–14.5)
GLUCOSE SERPL-MCNC: 97 MG/DL (ref 65–100)
HCT VFR BLD AUTO: 24.8 % (ref 35–47)
HGB BLD-MCNC: 8.2 G/DL (ref 11.5–16)
INR PPP: 1 (ref 0.9–1.1)
MCH RBC QN AUTO: 29.3 PG (ref 26–34)
MCHC RBC AUTO-ENTMCNC: 33.1 G/DL (ref 30–36.5)
MCV RBC AUTO: 88.6 FL (ref 80–99)
NRBC # BLD: 0 K/UL (ref 0–0.01)
NRBC BLD-RTO: 0 PER 100 WBC
PLATELET # BLD AUTO: 202 K/UL (ref 150–400)
PMV BLD AUTO: 10.1 FL (ref 8.9–12.9)
POTASSIUM SERPL-SCNC: 3.9 MMOL/L (ref 3.5–5.1)
PROTHROMBIN TIME: 10.4 SEC (ref 9–11.1)
RBC # BLD AUTO: 2.8 M/UL (ref 3.8–5.2)
SODIUM SERPL-SCNC: 138 MMOL/L (ref 136–145)
WBC # BLD AUTO: 8.6 K/UL (ref 3.6–11)

## 2021-02-09 PROCEDURE — 85610 PROTHROMBIN TIME: CPT

## 2021-02-09 PROCEDURE — 36415 COLL VENOUS BLD VENIPUNCTURE: CPT

## 2021-02-09 PROCEDURE — 80048 BASIC METABOLIC PNL TOTAL CA: CPT

## 2021-02-09 PROCEDURE — 74011250637 HC RX REV CODE- 250/637: Performed by: INTERNAL MEDICINE

## 2021-02-09 PROCEDURE — 74011250637 HC RX REV CODE- 250/637: Performed by: STUDENT IN AN ORGANIZED HEALTH CARE EDUCATION/TRAINING PROGRAM

## 2021-02-09 PROCEDURE — 85027 COMPLETE CBC AUTOMATED: CPT

## 2021-02-09 PROCEDURE — 97530 THERAPEUTIC ACTIVITIES: CPT

## 2021-02-09 PROCEDURE — 65270000029 HC RM PRIVATE

## 2021-02-09 PROCEDURE — 74011250636 HC RX REV CODE- 250/636: Performed by: STUDENT IN AN ORGANIZED HEALTH CARE EDUCATION/TRAINING PROGRAM

## 2021-02-09 PROCEDURE — 74011250636 HC RX REV CODE- 250/636: Performed by: PHYSICIAN ASSISTANT

## 2021-02-09 PROCEDURE — 74011000250 HC RX REV CODE- 250: Performed by: PHYSICIAN ASSISTANT

## 2021-02-09 RX ORDER — CEPHALEXIN 250 MG/5ML
500 POWDER, FOR SUSPENSION ORAL EVERY 12 HOURS
Status: DISCONTINUED | OUTPATIENT
Start: 2021-02-09 | End: 2021-02-09

## 2021-02-09 RX ADMIN — Medication 1 TABLET: at 09:44

## 2021-02-09 RX ADMIN — ACETAMINOPHEN 650 MG: 325 TABLET ORAL at 19:08

## 2021-02-09 RX ADMIN — ESCITALOPRAM OXALATE 10 MG: 10 TABLET ORAL at 09:44

## 2021-02-09 RX ADMIN — WATER 2 G: 1 INJECTION INTRAMUSCULAR; INTRAVENOUS; SUBCUTANEOUS at 18:52

## 2021-02-09 RX ADMIN — ENOXAPARIN SODIUM 30 MG: 30 INJECTION SUBCUTANEOUS at 05:10

## 2021-02-09 RX ADMIN — DOCUSATE SODIUM 50 MG AND SENNOSIDES 8.6 MG 1 TABLET: 8.6; 5 TABLET, FILM COATED ORAL at 09:44

## 2021-02-09 RX ADMIN — ACETAMINOPHEN 650 MG: 325 TABLET ORAL at 09:44

## 2021-02-09 RX ADMIN — ENOXAPARIN SODIUM 30 MG: 30 INJECTION SUBCUTANEOUS at 15:10

## 2021-02-09 RX ADMIN — CEPHALEXIN 500 MG: 250 CAPSULE ORAL at 09:44

## 2021-02-09 NOTE — PROGRESS NOTES
Drs. Sharyle Dalton, Raven Garcia, Joshua and Rebecca Cameron Date: 2/5/2021      Subjective:     Patient did some better yesterday. Sat on side of bed. Urinated on own last PM.   Better urine output after fluids increased yesterday.        Current Facility-Administered Medications   Medication Dose Route Frequency    cephALEXin (KEFLEX) capsule 500 mg  500 mg Oral Q12H    0.9% sodium chloride infusion 250 mL  250 mL IntraVENous PRN    famotidine (PEPCID) tablet 20 mg  20 mg Oral QHS    acetaminophen (TYLENOL) tablet 650 mg  650 mg Oral Q6H PRN    bisacodyL (DULCOLAX) suppository 10 mg  10 mg Rectal DAILY PRN    sodium chloride (NS) flush 5-40 mL  5-40 mL IntraVENous PRN    0.9% sodium chloride infusion  75 mL/hr IntraVENous CONTINUOUS    lidocaine (PF) (XYLOCAINE) 10 mg/mL (1 %) injection 0.1 mL  0.1 mL SubCUTAneous PRN    fentaNYL citrate (PF) injection 50 mcg  50 mcg IntraVENous PRN    midazolam (VERSED) injection 1 mg  1 mg IntraVENous PRN    midazolam (VERSED) injection 1 mg  1 mg IntraVENous PRN    escitalopram oxalate (LEXAPRO) tablet 10 mg  10 mg Oral DAILY    sodium chloride (NS) flush 5-40 mL  5-40 mL IntraVENous Q8H    naloxone (NARCAN) injection 0.4 mg  0.4 mg IntraVENous PRN    calcium-vitamin D (OS-CARLOS +D3) 500 mg-200 unit per tablet 1 Tab  1 Tab Oral TID WITH MEALS    senna-docusate (PERICOLACE) 8.6-50 mg per tablet 1 Tab  1 Tab Oral BID    polyethylene glycol (MIRALAX) packet 17 g  17 g Oral DAILY    bisacodyL (DULCOLAX) suppository 10 mg  10 mg Rectal DAILY PRN    oxyCODONE IR (ROXICODONE) tablet 2.5 mg  2.5 mg Oral Q4H PRN    enoxaparin (LOVENOX) injection 30 mg  30 mg SubCUTAneous Q12H    acetaminophen (TYLENOL) tablet 650 mg  650 mg Oral Q6H    lidocaine 4 % patch 1 Patch  1 Patch TransDERmal Q24H    traMADoL (ULTRAM) tablet 50 mg  50 mg Oral Q6H PRN          Objective:     Patient Vitals for the past 8 hrs:   BP Temp Pulse Resp SpO2 Weight   02/09/21 0310 131/75 98 °F (36.7 °C) 97 16 95 % 128 lb 8.5 oz (58.3 kg)     No intake/output data recorded. 02/07 1901 - 02/09 0700  In: -   Out: 750 [Urine:750]    Physical Exam: Lungs: clear to auscultation bilaterally  Heart: regular rate and rhythm, S1, S2 normal, no murmur, click, rub or gallop  Abdomen: soft, non-tender.  Bowel sounds normal. No masses,  no organomegaly        Data Review   Recent Results (from the past 24 hour(s))   PROTHROMBIN TIME + INR    Collection Time: 02/09/21  5:11 AM   Result Value Ref Range    INR 1.0 0.9 - 1.1      Prothrombin time 10.4 9.0 - 11.1 sec   CBC W/O DIFF    Collection Time: 02/09/21  5:11 AM   Result Value Ref Range    WBC 8.6 3.6 - 11.0 K/uL    RBC 2.80 (L) 3.80 - 5.20 M/uL    HGB 8.2 (L) 11.5 - 16.0 g/dL    HCT 24.8 (L) 35.0 - 47.0 %    MCV 88.6 80.0 - 99.0 FL    MCH 29.3 26.0 - 34.0 PG    MCHC 33.1 30.0 - 36.5 g/dL    RDW 15.9 (H) 11.5 - 14.5 %    PLATELET 072 639 - 291 K/uL    MPV 10.1 8.9 - 12.9 FL    NRBC 0.0 0  WBC    ABSOLUTE NRBC 0.00 0.00 - 0.01 K/uL           Assessment:     Active Problems:    GERD (gastroesophageal reflux disease) ()      Hip fracture ([facility]) (11/14/2020)      Mild dementia ([facility] [address] (2/6/2021)        Plan:     1) Continue post op care  2) Plan for home health and PT  3) Likely to be end of week before can get her home

## 2021-02-09 NOTE — PROGRESS NOTES
Pt did not take her antibiotic due to having trouble swallowing it. Georgia Mitchell and Zohra Browning informed and will address issue.

## 2021-02-09 NOTE — PROGRESS NOTES
Orthopedic Progress Note    S: Pain well controlled, no new complaints; states she is unable to swallow capsules or tolerate liquid medicines due to gagging; Denies numbness, tingling, focal weakness, cp, sob, fever, chills    O: NAD, respirations unlabored; Dressings CDI; thigh soft, no edema, no posterior calf ttp; DF/PF intact, SILT; foot warm, cap refill brisk. Patient Vitals for the past 4 hrs:   Temp Pulse BP   02/09/21 0933 98.7 °F (37.1 °C) 96 (!) 158/71     Recent Labs     02/09/21  0511 02/08/21  0349   HGB 8.2* 8.5*   HCT 24.8* 26.1*    200   BUN 18 28*   CREA 0.45* 0.75   K 3.9 4.0    138         A/P:  Procedure: Procedure(s):  RIGHT HIP CONVERSION TO ARTHROPLASTY TOTAL ANTERIOR APPROACH  Post Op day: 3 Days Post-Op    - 7 day course of Keflex for surgical ppx due to length of case and co morbidities. Discussed patients limitations with swallowing current capsules and oral suspensions, and there are no acceptable alternatives in tablet formulations; recommend opening capsule and adding to applesauce or other food patient is able to swallow. - DVT ppx - Lovenox daily for two weeks, then asa daily for 4 weeks; SCDs  - PT/OT - NWB RLE ( ok for flat foot on ground with walker, no direct transfers on RLE)  - Pain - Tylenol, Oxycodone PRN; Ice, Elevation, Ace wrap as needed  - Dressing - Leave in place if it remains dry and intact; change as needed for saturation with dry sterile island dressing  - Dispo - Pending PT/OT recs; needs f/u in 2-3 weeks with Dr. Shai Alonso; refer to DC instructions for further details. QUINTIN Matthews  Orthopedic Trauma Service  [address] [place] [address]        -------    Will DC PO Keflex and just continue IV Ancef until discharge.

## 2021-02-09 NOTE — PROGRESS NOTES
Bedside shift change report given to Annika miguel (oncoming nurse) by Angie Vela  (offgoing nurse). Report included the following information SBAR, Kardex, Intake/Output and MAR.

## 2021-02-09 NOTE — PROGRESS NOTES
Problem: Mobility Impaired (Adult and Pediatric)  Goal: *Acute Goals and Plan of Care (Insert Text)  Description: FUNCTIONAL STATUS PRIOR TO ADMISSION: Patient was ambulatory with rollator walker at baseline. Has history of falls. HOME SUPPORT PRIOR TO ADMISSION: The patient lived alone with family across the street to provide assistance. Physical Therapy Goals  Initiated 2/8/2021  1. Patient will move from supine to sit and sit to supine  in bed with maximal assistance within 7 day(s). 2.  Patient will transfer from bed to chair and chair to bed with maximal assistance x 2 using the least restrictive device within 7 day(s). 3.  Patient will perform sit to stand with maximal assistance x 2 within 7 day(s). 4.  Patient will complete HEP with modA within 7 days. Outcome: Progressing Towards Goal   PHYSICAL THERAPY TREATMENT  Patient: Kamari Rios (42 y.o. female)  Date: 2/9/2021  Diagnosis: Hip fracture ([facility] [address] Migue Harrison <principal problem not specified>  Procedure(s) (LRB):  RIGHT HIP CONVERSION TO ARTHROPLASTY TOTAL ANTERIOR APPROACH (Right) 3 Days Post-Op  Precautions: Fall, NWB  Chart, physical therapy assessment, plan of care and goals were reviewed. ASSESSMENT  Patient continues with skilled PT services and is progressing towards goals. Continues to require Max to Total Ax2 for bed mobility. She was able to complete sit<>stand w/Mod Ax2, maintaining NWBing w/ her foot placed on OT's foot to ensure WBing status. At this time, pt may benefit from SNF for additional rehab however family/daughter prefer pt to go home. They plan to have additional Assist as pt requires Ax2 at this time. Will continue to follow. Current Level of Function Impacting Discharge (mobility/balance): Mod A to Total Ax2     Other factors to consider for discharge: NWB status RLE         PLAN :  Patient continues to benefit from skilled intervention to address the above impairments.   Continue treatment per established plan of care. to address goals. Recommendation for discharge: (in order for the patient to meet his/her long term goals)  Therapy up to 5 days/week in SNF setting or intensive home health therapy program    This discharge recommendation:  Has been made in collaboration with the attending provider and/or case management    IF patient discharges home will need the following DME: TBD. Will continue to assess       SUBJECTIVE:   Patient stated After all I've been through, I'm very fortunate.     OBJECTIVE DATA SUMMARY:   Critical Behavior:  Neurologic State: Alert  Orientation Level: Oriented X4  Cognition: Appropriate for age attention/concentration  Safety/Judgement: Decreased awareness of environment, Decreased awareness of need for assistance, Decreased awareness of need for safety, Decreased insight into deficits  Functional Mobility Training:  Bed Mobility:     Supine to Sit: Total assistance;Assist x2  Sit to Supine: Total assistance;Assist x2           Transfers:  Sit to Stand: Moderate assistance;Assist x2  Stand to Sit: Moderate assistance;Assist x2                             Balance:  Sitting: Intact  Standing: Impaired; With support  Standing - Static: Constant support  Standing - Dynamic : Constant support  Ambulation/Gait Training:                    Right Side Weight Bearing: Non-weight bearing  Left Side Weight Bearing: Full    Pain Rating:  C/o pain w/ sitting    Activity Tolerance:   Limited    After treatment patient left in no apparent distress:   Sitting in chair, Call bell within reach, Caregiver / family present, and Side rails x 3    COMMUNICATION/COLLABORATION:   The patients plan of care was discussed with: Registered nurse.      Annabel Davies PTA   Time Calculation: 37 mins

## 2021-02-09 NOTE — PROGRESS NOTES
Bedside shift change report given to Link Rubio (oncoming nurse) by Ines Andrade (offgoing nurse). Report included the following information SBAR, Kardex, Intake/Output, MAR and Recent Results.

## 2021-02-09 NOTE — PROGRESS NOTES
Bedside and Verbal shift change report given to Heidi Mcelroy RN (oncoming nurse) by Dilip Bennett RN (offgoing nurse). Report included the following information SBAR, Kardex, Procedure Summary, Intake/Output, MAR and Recent Results.

## 2021-02-09 NOTE — PROGRESS NOTES
Problem: Self Care Deficits Care Plan (Adult)  Goal: *Acute Goals and Plan of Care (Insert Text)  Description: FUNCTIONAL STATUS PRIOR TO ADMISSION: Patient was ambulatory with rollator walker at baseline. Pt was mod I to supervision level for ADLs with assistance required for bathing. Has history of falls. HOME SUPPORT PRIOR TO ADMISSION: The patient lived alone with family across the street to provide assistance. Occupational Therapy Goals  Initiated 2/8/2021  1. Patient will perform grooming, seated EOB unsupported, with supervision/set-up within 7 day(s). 2.  Patient will perform upper body dressing with supervision/set-up within 7 day(s). 3.  Patient will perform anterior bathing from neck to thighs, seated EOB, with minimal assistance within 7 day(s). 4.  Patient will perform toilet transfers to [facility] with maximal assistance within 7 day(s). 5.  Patient will participate in upper extremity therapeutic exercise/activities with minimal assistance/contact guard assist for 5 minutes within 7 day(s). Outcome: Progressing Towards Goal   OCCUPATIONAL THERAPY TREATMENT  Patient: Ardia Bernheim (72 y.o. female)  Date: 2/9/2021  Diagnosis: Hip fracture ([address] Wes Mitchell <principal problem not specified>  Procedure(s) (LRB):  RIGHT HIP CONVERSION TO ARTHROPLASTY TOTAL ANTERIOR APPROACH (Right) 3 Days Post-Op  Precautions: Fall, NWB  Chart, occupational therapy assessment, plan of care, and goals were reviewed. ASSESSMENT  Patient continues with skilled OT services and is progressing towards goals. Remains with total A x2 for supine to sit, good static sitting balance this session with ability to scoot some to EOB even with 10/10 pain in R hip, mod A x2 for sit to stand with B UE on RW pull up for support, able to stand for 2 minutes for toileting and bathing with total A. Patient with good attempt for NWB for R LE, placed foot on therapist for improved sensory instruction for NWB status.  Remains total A for LB ADLs and toileting, pleasant and cooperative. Will need A x2 for home ADLs and functional mobility, family aware and plan to supply this support that is needed. Recommend HH OT.    Current Level of Function Impacting Discharge (ADLs): total A for LB ADls, total A for supine to sit, mod A x2 for sit to stand to RW    Other factors to consider for discharge: family wishes to take home with increased assist         PLAN :  Patient continues to benefit from skilled intervention to address the above impairments. Continue treatment per established plan of care. to address goals. Recommend with staff: bedpan for toileting, EOB for ADLs if tolerated with total A x2, good sitting balance    Recommend next OT session: EOB ADLs, standing trial in prep for pivot to BS/chair with  OR sliding board to [facility] if family prefers/dc impending    Recommendation for discharge: (in order for the patient to meet his/her long term goals)  Occupational therapy at least 2 days/week in the home AND ensure assist and/or supervision for safety with ADLs and transfers    This discharge recommendation:  Has been made in collaboration with the attending provider and/or case management    IF patient discharges home will need the following DME: possible DABSC       SUBJECTIVE:   Patient stated I am so blessed.     OBJECTIVE DATA SUMMARY:   Cognitive/Behavioral Status:  Neurologic State: Alert  Orientation Level: Oriented X4  Cognition: Appropriate for age attention/concentration             Functional Mobility and Transfers for ADLs:  Bed Mobility:  Supine to Sit: Total assistance;Assist x2  Sit to Supine: Total assistance;Assist x2    Transfers:  Sit to Stand: Moderate assistance;Assist x2          Balance:  Sitting: Intact  Standing: Impaired; With support  Standing - Static: Constant support  Standing - Dynamic : Constant support    ADL Intervention:  Feeding  Feeding Assistance: Set-up    Grooming  Washing Face: Set-up  Brushing Teeth: Set-up         Lower Body Bathing  Bathing Assistance: Total assistance(dependent)         Lower Body Dressing Assistance  Socks: Total assistance (dependent)     Patient instructed and indicated understanding the benefits of maintaining activity tolerance, functional mobility, and independence with self care tasks during acute stay  to ensure safe return home and to baseline. Encouraged patient to increase frequency and duration OOB, be out of bed for all meals, perform daily ADLs (as approved by RN/MD regarding bathing etc), and performing functional mobility to/from bathroom. Patient recalled and demonstrated avoiding extreme planes of movement with Right LE during ADLs and functional mobility and NWB with verbal cues. Therapeutic Exercises:       Pain:  10/10 R hip sitting    Activity Tolerance:   Fair and requires rest breaks    After treatment patient left in no apparent distress:   Supine in bed, Call bell within reach, and Caregiver / family present    COMMUNICATION/COLLABORATION:   The patients plan of care was discussed with: Physical therapist, Registered nurse, and Rehabilitation technician.      Vladislav Horton OT  Time Calculation: 27 mins

## 2021-02-09 NOTE — PROGRESS NOTES
Physician Progress Note      Montrell Garcia  CSN #:                  [id number]  :                       3/7/1928  ADMIT DATE:       2021 3:20 PM  [address] [place] DATE:  RESPONDING  PROVIDER #:        Teresa Valdez MD          QUERY TEXT:    Pt admitted with recurrent right hip fracture from fall. Pt noted to have osteopenia. If possible, please document in progress notes and discharge summary if you are evaluating and/or treating any of the following: The medical record reflects the following:  Risk Factors: 89yo who recently had a fall in 2020 and had a closed reduction and percutaneous pinning is now re-admitted for right hip fracture from fall  Clinical Indicators:  - Problem list: Osteopenia  - CT: IMPRESSION  1. Fractures of the right sacroiliac parasymphyseal pubic bone age indeterminate. Lack of adjacent edema favors chronic fractures  2. Interval collapse of the right femoral head. 2 screws now project past the cortical margin new in the interval with lateral subluxation of the femoral head, joint effusion and multiple small loose bodies within the joint  Treatment: ortho consult, Right total hip conversion arthroplasty    Thank you,  Nicholas Greenwood  [phone]  Options provided:  -- Pathological right hip fracture  -- Osteoporotic right hip fracture  -- Osteoporotic right hip fracture following fall which would not usually break a normal, healthy bone  -- Traumatic right hip fracture  -- Other - I will add my own diagnosis  -- Disagree - Not applicable / Not valid  -- Disagree - Clinically unable to determine / Unknown  -- Refer to Clinical Documentation Reviewer    PROVIDER RESPONSE TEXT:    This patient has a traumatic fracture of right hip. Query created by: Wilver Fraser on 2021 4:17 PM      QUERY TEXT:    Pt admitted with left hip fracture and has mild dementia documented.   Pt noted to become agitated when ED provider spoke to patient regarding findings and plan. Daughter was brought in to speak to patient but patient still remained agitated. Patient was then given 2mg IM Haldol. If possible, please document in the progress notes and discharge summary if you are evaluating and / or treating any of the following: The medical record reflects the following:  Risk Factors: 89yo admitted for right hip fracture  Clinical Indicators:  - ED:  Pt informed of findings and plan and she became agitated. I brought her daughter in to speak with her and she remains agitated but eventually agrees to lab work, admission, and surgery. 2 mg IM Haldol given to assist in agitation as nurses were initially unable to obtain labs. - H&P: In the ER, she had some mild agitation, which responded to Haldol  - 2/6 Internal Medicine: Quiet night. Hx Dementia , mild confusion. 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     Query created by: Jaida Richmond on 2/8/2021 4:30 PM      Electronically signed by:  Christian Ng MD 2/9/2021 8:17 AM

## 2021-02-10 LAB
INR PPP: 1 (ref 0.9–1.1)
PROTHROMBIN TIME: 10.5 SEC (ref 9–11.1)

## 2021-02-10 PROCEDURE — 74011000250 HC RX REV CODE- 250: Performed by: PHYSICIAN ASSISTANT

## 2021-02-10 PROCEDURE — 65270000029 HC RM PRIVATE

## 2021-02-10 PROCEDURE — 74011250636 HC RX REV CODE- 250/636: Performed by: INTERNAL MEDICINE

## 2021-02-10 PROCEDURE — 74011250636 HC RX REV CODE- 250/636: Performed by: PHYSICIAN ASSISTANT

## 2021-02-10 PROCEDURE — 97535 SELF CARE MNGMENT TRAINING: CPT

## 2021-02-10 PROCEDURE — 74011000250 HC RX REV CODE- 250: Performed by: INTERNAL MEDICINE

## 2021-02-10 PROCEDURE — 85610 PROTHROMBIN TIME: CPT

## 2021-02-10 PROCEDURE — 74011250637 HC RX REV CODE- 250/637: Performed by: STUDENT IN AN ORGANIZED HEALTH CARE EDUCATION/TRAINING PROGRAM

## 2021-02-10 PROCEDURE — 97530 THERAPEUTIC ACTIVITIES: CPT

## 2021-02-10 PROCEDURE — 74011250636 HC RX REV CODE- 250/636: Performed by: STUDENT IN AN ORGANIZED HEALTH CARE EDUCATION/TRAINING PROGRAM

## 2021-02-10 PROCEDURE — 36415 COLL VENOUS BLD VENIPUNCTURE: CPT

## 2021-02-10 PROCEDURE — 74011250637 HC RX REV CODE- 250/637: Performed by: INTERNAL MEDICINE

## 2021-02-10 RX ADMIN — Medication 10 ML: at 23:33

## 2021-02-10 RX ADMIN — WATER 2 G: 1 INJECTION INTRAMUSCULAR; INTRAVENOUS; SUBCUTANEOUS at 10:49

## 2021-02-10 RX ADMIN — WATER 2 G: 1 INJECTION INTRAMUSCULAR; INTRAVENOUS; SUBCUTANEOUS at 23:31

## 2021-02-10 RX ADMIN — ACETAMINOPHEN 650 MG: 325 TABLET ORAL at 23:32

## 2021-02-10 RX ADMIN — DOCUSATE SODIUM 50 MG AND SENNOSIDES 8.6 MG 1 TABLET: 8.6; 5 TABLET, FILM COATED ORAL at 09:21

## 2021-02-10 RX ADMIN — ENOXAPARIN SODIUM 30 MG: 30 INJECTION SUBCUTANEOUS at 02:28

## 2021-02-10 RX ADMIN — ACETAMINOPHEN 650 MG: 325 TABLET ORAL at 09:22

## 2021-02-10 RX ADMIN — WATER 2 G: 1 INJECTION INTRAMUSCULAR; INTRAVENOUS; SUBCUTANEOUS at 02:29

## 2021-02-10 RX ADMIN — DOCUSATE SODIUM 50 MG AND SENNOSIDES 8.6 MG 1 TABLET: 8.6; 5 TABLET, FILM COATED ORAL at 17:33

## 2021-02-10 RX ADMIN — Medication 1 TABLET: at 09:21

## 2021-02-10 RX ADMIN — SODIUM CHLORIDE 75 ML/HR: 900 INJECTION, SOLUTION INTRAVENOUS at 05:00

## 2021-02-10 RX ADMIN — Medication 1 TABLET: at 17:33

## 2021-02-10 RX ADMIN — ACETAMINOPHEN 650 MG: 325 TABLET ORAL at 02:28

## 2021-02-10 RX ADMIN — Medication 1 TABLET: at 13:50

## 2021-02-10 RX ADMIN — ACETAMINOPHEN 650 MG: 325 TABLET ORAL at 17:33

## 2021-02-10 RX ADMIN — ENOXAPARIN SODIUM 30 MG: 30 INJECTION SUBCUTANEOUS at 13:51

## 2021-02-10 RX ADMIN — ESCITALOPRAM OXALATE 10 MG: 10 TABLET ORAL at 09:21

## 2021-02-10 NOTE — PROGRESS NOTES
Problem: Mobility Impaired (Adult and Pediatric)  Goal: *Acute Goals and Plan of Care (Insert Text)  Description: FUNCTIONAL STATUS PRIOR TO ADMISSION: Patient was ambulatory with rollator walker at baseline. Has history of falls. HOME SUPPORT PRIOR TO ADMISSION: The patient lived alone with family across the street to provide assistance. Physical Therapy Goals  Initiated 2/8/2021  1. Patient will move from supine to sit and sit to supine  in bed with maximal assistance within 7 day(s). 2.  Patient will transfer from bed to chair and chair to bed with maximal assistance x 2 using the least restrictive device within 7 day(s). 3.  Patient will perform sit to stand with maximal assistance x 2 within 7 day(s). 4.  Patient will complete HEP with modA within 7 days. Outcome: Progressing Towards Goal   PHYSICAL THERAPY TREATMENT  Patient: Stef Vidales (50 y.o. female)  Date: 2/10/2021  Diagnosis: Hip fracture ([facility] [address] [S72.009A] <principal problem not specified>  Procedure(s) (LRB):  RIGHT HIP CONVERSION TO ARTHROPLASTY TOTAL ANTERIOR APPROACH (Right) 4 Days Post-Op  Precautions: Fall, NWB  Chart, physical therapy assessment, plan of care and goals were reviewed. ASSESSMENT  Patient continues with skilled PT services and is progressing towards goals. Worked on laterally scooting to drop arm BSC and back to the bed with moderate assistance using the marc pad to facilitate transfer. She was able to use her UEs and LLE to assist with transfer and avoided having her stand during session, since she is not able to maintain NWB on the RLE. Her bed mobility still requires mod to max assist for supine to/from sitting. She benefits considerably from [address] being elevated for supine to sit.     For home, she will need the following in order to maintain NWB on the RLE consistently:  -Platform drop arm bedside commode (to enable lateral transfer with minimal risk to her skin)  -Ambulance transport home (to manage the 2 steps to enter the home safely)  -Wheelchair with the following specifications:      Lightweight (due to advanced age and generalized weakness)      16\" wide by 18\" deep      Standard height      Flip back desk arms      Swing away leg rests      Pull to lock brakes with extensions      Seatlbelt      Standard foam cushion with solid seat insert  -Hospital bed recommended due to limited ability to manage transfers, limited endurance, need for [facility] elevated for meals in bed as needed    We will continue teaching with patient and family to improve strength, endurance and transfer safety. Current Level of Function Impacting Discharge (mobility/balance): mod to max assist for bed mobility and lateral seated transfers    Other factors to consider for discharge: family will need to arrange 24 hour caregivers for home         PLAN :  Patient continues to benefit from skilled intervention to address the above impairments. Continue treatment per established plan of care. to address goals. Recommendation for discharge: (in order for the patient to meet his/her long term goals)  Physical therapy at least 2 days/week in the home AND ensure assist and/or supervision for safety with all mobility    This discharge recommendation:  Has been made in collaboration with the attending provider and/or case management    IF patient discharges home will need the following DME: as noted above       SUBJECTIVE:   Patient stated I just want to do all I can to get better.     OBJECTIVE DATA SUMMARY:   Critical Behavior:  Neurologic State: Alert  Orientation Level: Oriented to person, Oriented to place, Oriented to situation  Cognition: Follows commands, Appropriate for age attention/concentration  Safety/Judgement: Fall prevention, Awareness of environment, Decreased insight into deficits  Functional Mobility Training:  Bed Mobility:  Rolling: Minimum assistance; Moderate assistance  Supine to Sit: Maximum assistance; Additional time  Sit to Supine: Moderate assistance  Scooting: Minimum assistance; Additional time(increased verbal and tactile cues)        Transfers:              Bed to Chair: Moderate assistance; Additional time;Assist x2(lateral transfer)                    Balance:  Sitting: Intact  Standing: (not attempted due to NWB 80 yrs old)  Ambulation/Gait Training:                                                        Stairs: Therapeutic Exercises:     Pain Rating:  Some pain R hip with bed mobility and transfers, had tylenol prior to session    Activity Tolerance:   Fair    After treatment patient left in no apparent distress:   Supine in bed, Call bell within reach, Caregiver / family present, and Side rails x 3    COMMUNICATION/COLLABORATION:   The patients plan of care was discussed with: Occupational therapist, Registered nurse, and Case management.      Jannie Mcdonnell, PT   Time Calculation: 47 mins

## 2021-02-10 NOTE — PROGRESS NOTES
EDYTA: Plan for discharge home with family. [facility] has accepted patient for [facility]. Order for wheelchair sent to [employer]. CM provided family with list of private duty caregivers and places they can rent a hospital bed for home. S transport at discharge. Chart reviewed. Noted consult for hospital bed and [facility] aid. CM will provide family with resources on where to rent a hospital bed for home. CM sent updated orders to [facility] to add home health aid. CM spoke with PT. The patient will also need a wheelchair. CM will follow for orders/letter of medical necessity needed for wheelchair order. CM sent order for wheelchair to [facility] via [employer].     BLAYNE Yi/CRM

## 2021-02-10 NOTE — PROGRESS NOTES
Bedside shift change report given to Abdirahman (oncoming nurse) by Flaco Bergeron (offgoing nurse). Report included the following information SBAR, Kardex, Intake/Output, MAR and Recent Results.

## 2021-02-10 NOTE — PROGRESS NOTES
Problem: Self Care Deficits Care Plan (Adult)  Goal: *Acute Goals and Plan of Care (Insert Text)  Description: FUNCTIONAL STATUS PRIOR TO ADMISSION: Patient was ambulatory with rollator walker at baseline. Pt was mod I to supervision level for ADLs with assistance required for bathing. Has history of falls. Had ORIF Nov 2020 and is receiving HH therapy: had just recently progressed to a few hours a day alone. Using [facility] [employer] SUPPORT PRIOR TO ADMISSION: The patient lived alone with family across the street to provide assistance. All family has job with somewhat flexible hours but patient will need 24/7 care at discharge; Daughter Ilda Hutchison  aware that 24/7 is needed. Occupational Therapy Goals  Initiated 2/8/2021  1. Patient will perform grooming, seated EOB unsupported, with supervision/set-up within 7 day(s). 2.  Patient will perform upper body dressing with supervision/set-up within 7 day(s). 3.  Patient will perform anterior bathing from neck to thighs, seated EOB, with minimal assistance within 7 day(s). 4.  Patient will perform toilet transfers to [facility] with maximal assistance within 7 day(s). 5.  Patient will participate in upper extremity therapeutic exercise/activities with minimal assistance/contact guard assist for 5 minutes within 7 day(s). 2/10/2021 1256 by Kallie Osuna  Outcome: Progressing Towards Goal    OCCUPATIONAL THERAPY TREATMENT  Patient: Radha Navarro (52 y.o. female)  Date: 2/10/2021  Diagnosis: Hip fracture ([address] Praveena Chin <principal problem not specified>  Procedure(s) (LRB):  RIGHT HIP CONVERSION TO ARTHROPLASTY TOTAL ANTERIOR APPROACH (Right) 4 Days Post-Op  Precautions: Fall, NWB  Chart, occupational therapy assessment, plan of care, and goals were reviewed. ASSESSMENT  Patient continues with skilled OT services and is progressing towards goals. Good participation with therapy post op day 4 from anterior THR.  Supportive daughter present for observational training; will need hands on lateral transfer training. PT placing w/c order needed for safe lateral transfers. Current Level of Function Impacting Discharge (ADLs): set up, occasional CGA with fatigue UE ADLs; Max A-D LE ADLs but willing to attempt and assist with tasks: will need adapted techniques with NWB status: AE and PRN assist; easiest in long sitting/rolling and bridging. Mod A x 2 with lateral transfer increased set up time due to frail skin and NWB status    Other factors to consider for discharge: Patient will have 24/7 care PRN and family reports willing to purchase/rent all necessary DME if not covered by insurance. They plan to rent hospital bed         PLAN :  Patient continues to benefit from skilled intervention to address the above impairments. Continue treatment per established plan of care. to address goals. Recommend with staff: bed in chair position for meals    Recommend next OT session: family training w/lateral transfers; LE AE training; easiest clothing for NWB & EC at 80    Recommendation for discharge: (in order for the patient to meet his/her long term goals)  Occupational therapy at least 2 days/week in the home AND ensure assist and/or supervision for safety with all self care and functional mobility    This discharge recommendation:  Has been made in collaboration with the attending provider and/or case management    IF patient discharges home will need the following DME: AE: long handled bathing, AE: long handled dressing, bedside commode, hospital bed, transfer bench, walker: rolling, and wheelchair       SUBJECTIVE:   Patient stated I believe in myself.     OBJECTIVE DATA SUMMARY:   Cognitive/Behavioral Status:  Neurologic State: Alert  Orientation Level: Oriented to person;Oriented to place;Oriented to situation  Cognition: Follows commands; Appropriate for age attention/concentration  Perception: Appears intact  Perseveration: No perseveration noted  Safety/Judgement: Fall prevention; Awareness of environment;Decreased insight into deficits    Functional Mobility and Transfers for ADLs:  Bed Mobility:  Rolling: Minimum assistance; Moderate assistance  Sit to Supine: Moderate assistance  Scooting: Minimum assistance; Additional time(increased verbal and tactile cues)    Transfers:     Functional Transfers  Toilet Transfer : Moderate assistance;Maximum assistance;Assist x1;Additional time; Adaptive equipment(HDDABSC, use of draw sheet/pad to decr. sheer/protect skin)  Bed to Chair: Moderate assistance; Additional time;Assist x2(lateral transfer)    Balance:  Sitting: Intact  Standing: (not attempted due to NWB 80 yrs old)    ADL Intervention:  Feeding  Feeding Assistance: Modified independent                        Lower Body Dressing Assistance  Dressing Assistance: Total assistance(dependent)(inferred; able to assist w/roll & bridge briefly)    Toileting  Toileting Assistance: Total assistance(dependent)(inferred)    Cognitive Retraining  Attention to Task: Single task  Maintains Attention For (Time): Greater than 10 minutes  Following Commands: Follows one step commands/directions(2 step commands more difficult for patient)  Safety/Judgement: Fall prevention; Awareness of environment;Decreased insight into deficits      Pain:  No significant c/o undue pain during functional mobility tasks    Activity Tolerance:   Fair, SpO2 stable on RA, and fatigued post activity but able to remain awake and participate passively in discharge planning conversation; daughter reports patient has had poor sleep last night     After treatment patient left in no apparent distress:   Supine in bed, Call bell within reach, Caregiver / family present, and Side rails x 3    COMMUNICATION/COLLABORATION:   The patients plan of care was discussed with: Physical therapist, Registered nurse, and Case management.      Dimas CHAO/L  Time Calculation: 25 mins

## 2021-02-10 NOTE — ROUTINE PROCESS
Bedside shift change report given to Jolene Otero (oncoming nurse) by Cresencio Dandy, RN (offgoing nurse). Report included the following information SBAR.

## 2021-02-10 NOTE — PROGRESS NOTES
Orthopedic Progress Note    S: Pain well controlled, no pain currently; no new complaints;Denies numbness, tingling, focal weakness, cp, sob, fever, chills    O: NAD, respirations unlabored; Dressings with small stain, but dry and intact; thigh soft, mild swelling, no edema, no posterior calf ttp;DF/PF 5/5, SILT; Cap refill brisk, foot warm, DP2+    Patient Vitals for the past 4 hrs:   Temp Pulse BP   02/10/21 1215  (!) 105    02/10/21 0912 98.2 °F (36.8 °C) 89 (!) 155/81     Recent Labs     02/09/21  0511 02/08/21  0349   HGB 8.2* 8.5*   HCT 24.8* 26.1*    200   BUN 18 28*   CREA 0.45* 0.75   K 3.9 4.0    138         A/P:  Procedure: Procedure(s):  RIGHT HIP CONVERSION TO ARTHROPLASTY TOTAL ANTERIOR APPROACH  Post Op day: 4 Days Post-Op    - IV Ancef until 2/14 or discharge for surgical ppx.  - DVT ppx - Lovenox daily for two weeks, then asa daily for 4 weeks; SCDs  - PT/OT - NWB RLE ( ok for flat foot on ground with walker, no direct transfers on RLE)  - Pain - Tylenol, Oxycodone PRN; Ice, Elevation, Ace wrap as needed  - Dressing - Leave in place if it remains dry and intact; change as needed for saturation with dry sterile island dressing  - Dispo - Home with [facility]; needs f/u in 2-3 weeks with Dr. Robert Siddiqi; refer to CT instructions for further details.     QUINTIN Phan  Orthopedic Trauma Service  [address], [address]  [facility]  [facility]

## 2021-02-10 NOTE — PROGRESS NOTES
Miko Abdi, Joshua Aragon, and Jojo Yanez Date: 2/5/2021      Subjective:     Patient is AF, VSS. Good urine output  Improving daily with PT. ..       Current Facility-Administered Medications   Medication Dose Route Frequency    ceFAZolin (ANCEF) 2 g in sterile water (preservative free) 20 mL IV syringe  2 g IntraVENous Q8H    0.9% sodium chloride infusion 250 mL  250 mL IntraVENous PRN    famotidine (PEPCID) tablet 20 mg  20 mg Oral QHS    acetaminophen (TYLENOL) tablet 650 mg  650 mg Oral Q6H PRN    sodium chloride (NS) flush 5-40 mL  5-40 mL IntraVENous PRN    escitalopram oxalate (LEXAPRO) tablet 10 mg  10 mg Oral DAILY    sodium chloride (NS) flush 5-40 mL  5-40 mL IntraVENous Q8H    naloxone (NARCAN) injection 0.4 mg  0.4 mg IntraVENous PRN    calcium-vitamin D (OS-CARLOS +D3) 500 mg-200 unit per tablet 1 Tab  1 Tab Oral TID WITH MEALS    senna-docusate (PERICOLACE) 8.6-50 mg per tablet 1 Tab  1 Tab Oral BID    polyethylene glycol (MIRALAX) packet 17 g  17 g Oral DAILY    bisacodyL (DULCOLAX) suppository 10 mg  10 mg Rectal DAILY PRN    oxyCODONE IR (ROXICODONE) tablet 2.5 mg  2.5 mg Oral Q4H PRN    enoxaparin (LOVENOX) injection 30 mg  30 mg SubCUTAneous Q12H    acetaminophen (TYLENOL) tablet 650 mg  650 mg Oral Q6H    lidocaine 4 % patch 1 Patch  1 Patch TransDERmal Q24H    traMADoL (ULTRAM) tablet 50 mg  50 mg Oral Q6H PRN          Objective:     Patient Vitals for the past 8 hrs:   BP Temp Pulse Resp SpO2   02/10/21 0226 (!) 174/72 98.8 °F (37.1 °C) 92 16 93 %     No intake/output data recorded. 02/08 1901 - 02/10 0700  In: 300 [P.O.:300]  Out: 1100 [Urine:1100]    Physical Exam: Lungs: clear to auscultation bilaterally  Heart: regular rate and rhythm, S1, S2 normal, no murmur, click, rub or gallop  Abdomen: soft, non-tender.  Bowel sounds normal. No masses,  no organomegaly        Data Review   Recent Results (from the past 24 hour(s))   PROTHROMBIN TIME + INR Collection Time: 02/10/21  3:11 AM   Result Value Ref Range    INR 1.0 0.9 - 1.1      Prothrombin time 10.5 9.0 - 11.1 sec           Assessment:     Active Problems:    GERD (gastroesophageal reflux disease) ()      Hip fracture ([facility] (11/14/2020)      Mild dementia ([facility] (2/6/2021)        Plan:     1) D/C IVF  2) Cont to work with PT  3) Need to get hospital bed to her home  4) Will not be ready for discharge in AM  Likely will be in until early next week.

## 2021-02-11 LAB — INR BLD: 1.1 (ref 0.9–1.2)

## 2021-02-11 PROCEDURE — 74011250636 HC RX REV CODE- 250/636: Performed by: PHYSICIAN ASSISTANT

## 2021-02-11 PROCEDURE — 74011250636 HC RX REV CODE- 250/636: Performed by: STUDENT IN AN ORGANIZED HEALTH CARE EDUCATION/TRAINING PROGRAM

## 2021-02-11 PROCEDURE — 85610 PROTHROMBIN TIME: CPT

## 2021-02-11 PROCEDURE — 74011250637 HC RX REV CODE- 250/637: Performed by: INTERNAL MEDICINE

## 2021-02-11 PROCEDURE — 74011000250 HC RX REV CODE- 250: Performed by: INTERNAL MEDICINE

## 2021-02-11 PROCEDURE — 74011000250 HC RX REV CODE- 250: Performed by: PHYSICIAN ASSISTANT

## 2021-02-11 PROCEDURE — 97535 SELF CARE MNGMENT TRAINING: CPT

## 2021-02-11 PROCEDURE — 97530 THERAPEUTIC ACTIVITIES: CPT

## 2021-02-11 PROCEDURE — 65270000029 HC RM PRIVATE

## 2021-02-11 PROCEDURE — 77030038269 HC DRN EXT URIN PURWCK BARD -A

## 2021-02-11 PROCEDURE — 74011250637 HC RX REV CODE- 250/637: Performed by: STUDENT IN AN ORGANIZED HEALTH CARE EDUCATION/TRAINING PROGRAM

## 2021-02-11 RX ADMIN — ESCITALOPRAM OXALATE 10 MG: 10 TABLET ORAL at 10:02

## 2021-02-11 RX ADMIN — WATER 2 G: 1 INJECTION INTRAMUSCULAR; INTRAVENOUS; SUBCUTANEOUS at 05:56

## 2021-02-11 RX ADMIN — Medication 10 ML: at 05:57

## 2021-02-11 RX ADMIN — WATER 2 G: 1 INJECTION INTRAMUSCULAR; INTRAVENOUS; SUBCUTANEOUS at 14:00

## 2021-02-11 RX ADMIN — ACETAMINOPHEN 650 MG: 325 TABLET ORAL at 22:06

## 2021-02-11 RX ADMIN — ENOXAPARIN SODIUM 30 MG: 30 INJECTION SUBCUTANEOUS at 14:19

## 2021-02-11 RX ADMIN — DOCUSATE SODIUM 50 MG AND SENNOSIDES 8.6 MG 1 TABLET: 8.6; 5 TABLET, FILM COATED ORAL at 17:28

## 2021-02-11 RX ADMIN — Medication 10 ML: at 22:07

## 2021-02-11 RX ADMIN — WATER 2 G: 1 INJECTION INTRAMUSCULAR; INTRAVENOUS; SUBCUTANEOUS at 22:00

## 2021-02-11 RX ADMIN — DOCUSATE SODIUM 50 MG AND SENNOSIDES 8.6 MG 1 TABLET: 8.6; 5 TABLET, FILM COATED ORAL at 10:02

## 2021-02-11 RX ADMIN — ACETAMINOPHEN 650 MG: 325 TABLET ORAL at 10:02

## 2021-02-11 RX ADMIN — ACETAMINOPHEN 650 MG: 325 TABLET ORAL at 17:28

## 2021-02-11 RX ADMIN — ACETAMINOPHEN 650 MG: 325 TABLET ORAL at 05:57

## 2021-02-11 RX ADMIN — FAMOTIDINE 20 MG: 20 TABLET ORAL at 22:06

## 2021-02-11 RX ADMIN — ENOXAPARIN SODIUM 30 MG: 30 INJECTION SUBCUTANEOUS at 03:15

## 2021-02-11 NOTE — ROUTINE PROCESS
Bedside shift change report given to Rubén Lopez RN (oncoming nurse) by Molly Pimentel RN (offgoing nurse).  Report included the following information SBAR

## 2021-02-11 NOTE — PROGRESS NOTES
Progress Note    Patient: Cyndee Bello MRN: [id number]  SSN: [id number]    YOB: 1928  Age: 80 y.o. Sex: female      Admit Date: 2/5/2021    LOS: 6 days     Subjective:     POD#5  s/p conversion R ROMAIN. Patient asleep this AM. Per daughter, able to transfer with PT yesterday. Feels that the patient is improving. Soreness to right hip improving. Objective:     Vitals:    02/10/21 1215 02/10/21 1356 02/10/21 2046 02/11/21 0322   BP:  (!) 148/57 (!) 163/76 (!) 166/84   Pulse: (!) 105 91 87 99   Resp:  16 16 16   Temp:  98.4 °F (36.9 °C) 98 °F (36.7 °C) 98 °F (36.7 °C)   SpO2: 93% 94% 95% 92%   Weight:       Height:            Intake and Output:  Current Shift: No intake/output data recorded. Last three shifts: 02/09 1901 - 02/11 0700  In: 50 [P.O.:50]  Out: 1300 [Urine:1300]    Physical Exam:   Gen: Alert to name, can follow basic commands  RLE: +EHL/FHL, can fire quads but difficulty with straight leg raise 2/2 pain. Toes WWP. Abduction pillow in place Dressings c/d/i, minimal strike through noted.      Lab/Data Review:  Hgb:  8.2     Assessment:   79 yo female s/p conversion R ROMAIN (DOS 2/6/21)     Plan:     -NWB RLE (ok for foot flat/foot on ground with a walker, no direct transfers on RLE)  -IV ancef until 2/14 (patient cannot tolerate PO keflex)  -PT/OT eval/treat: NWB RLE  -Progress diet   -CM for dispo planning (family wishes to go home w/ [facility])  -Pain control per primary  -IS 10x/hr/day when awake  -Dressing change today       Signed By: Rose Yi MD     February 11, 2021

## 2021-02-11 NOTE — PROGRESS NOTES
EDYTA: Plan for discharge home with family when medically stable. [facility] has accepted patient for [facility]. Order for wheelchair and platform drop arm bedside commode sent to [employer]. Will need MD signature on detailed written order for DME-form located on hard chart. CM provided family with list of private duty caregivers and places they can rent a hospital bed for home. BLS transport at discharge. Chart reviewed. CM sent perfect serve to MD to notify that signature is needed on Detailed written order form for DME.     Rockford Spurling, LISANDROW/CRM

## 2021-02-11 NOTE — PROGRESS NOTES
Problem: Self Care Deficits Care Plan (Adult)  Goal: *Acute Goals and Plan of Care (Insert Text)  Description: FUNCTIONAL STATUS PRIOR TO ADMISSION: Patient was ambulatory with rollator walker at baseline. Pt was mod I to supervision level for ADLs with assistance required for bathing. Has history of falls. Had ORIF Nov 2020 and is receiving HH therapy: had just recently progressed to a few hours a day alone. Using [employer] SUPPORT PRIOR TO ADMISSION: The patient lived alone with family across the street to provide assistance. All family has job with somewhat flexible hours but patient will need 24/7 care at discharge; Daughter Rico Adrian  aware that 24/7 is needed. Occupational Therapy Goals  Initiated 2/8/2021  1. Patient will perform grooming, seated EOB unsupported, with supervision/set-up within 7 day(s). 2.  Patient will perform upper body dressing with supervision/set-up within 7 day(s). 3.  Patient will perform anterior bathing from neck to thighs, seated EOB, with minimal assistance within 7 day(s). 4.  Patient will perform toilet transfers to [facility] with maximal assistance within 7 day(s). 5.  Patient will participate in upper extremity therapeutic exercise/activities with minimal assistance/contact guard assist for 5 minutes within 7 day(s). Outcome: Progressing Towards Goal   OCCUPATIONAL THERAPY TREATMENT  Patient: Miguel Ángel Baez (22 y.o. female)  Date: 2/11/2021  Diagnosis: Hip fracture ([facility] [address] [S72.009A] <principal problem not specified>  Procedure(s) (LRB):  RIGHT HIP CONVERSION TO ARTHROPLASTY TOTAL ANTERIOR APPROACH (Right) 5 Days Post-Op  Precautions: Fall, NWB  Chart, occupational therapy assessment, plan of care, and goals were reviewed. ASSESSMENT  Patient continues with skilled OT services and is progressing towards goals.   Increased anxiety and fearfulness this session, cannot state precautions or surgery this date, mod A x2 for bed mobility and lateral scoot transfer to [facility] in prep for home with family/caregiver assist, trialed sit to stand with RW from [facility] however unable to maintain NWB on R LE and fatigue noted. Less participation this session even though motivated. Will continue to retrain for lateral DABSC transfer. Continue to recommend 24/7 care. Current Level of Function Impacting Discharge (ADLs): mod-max Ax2, DABSC transfer    Other factors to consider for discharge: R LE NWB         PLAN :  Patient continues to benefit from skilled intervention to address the above impairments. Continue treatment per established plan of care. to address goals. Recommend with staff: Gilmar Rios x2 for toileting    Recommend next OT session: toileting hygiene retraining with [facility] transfer and sit to stand for hygiene/CM with R LE NWB, and EOB ADLs    Recommendation for discharge: (in order for the patient to meet his/her long term goals)  Occupational therapy at least 2 days/week in the home AND ensure assist and/or supervision for safety with all ADLs    This discharge recommendation:  Has been made in collaboration with the attending provider and/or case management    IF patient discharges home will need the following DME: renting hospital bed, w/c and DABSC ordered; declines hip kit due to 24/7 total care       SUBJECTIVE:   Patient stated I can tell you now this is not a good idea.  re: standing trial    OBJECTIVE DATA SUMMARY:   Cognitive/Behavioral Status:  Neurologic State: Alert;Confused  Orientation Level: Oriented X4  Cognition: Follows commands             Functional Mobility and Transfers for ADLs:  Bed Mobility:  Supine to Sit: Assist x2; Moderate assistance  Sit to Supine: Assist x2;Maximum assistance    Transfers:  Sit to Stand: Assist x2; Moderate assistance(with A for R LE NWB)  Functional Transfers  Toilet Transfer : Assist x2; Moderate assistance; Additional time       Balance:  Sitting: Intact  Standing: Impaired; With support  Standing - Static: Constant support;Poor    ADL Intervention:     [id number] tranfer retraining with increased time, daughter present for home education/knowledge of A x2-3 required currently. Working on hiring help and DME             Patient did not recalled and demonstrated avoiding extreme planes of movement with Right LE during ADLs and functional mobility with MAX cues. Therapeutic Exercises:       Pain:  R LE with scooting    Activity Tolerance:   Fair and requires rest breaks    After treatment patient left in no apparent distress:   Supine in bed, Call bell within reach, and Caregiver / family present    COMMUNICATION/COLLABORATION:   The patients plan of care was discussed with: Physical therapy assistant and Registered nurse.      Sylvie Horton, MOE  Time Calculation: 27 mins

## 2021-02-11 NOTE — PROGRESS NOTES
Problem: Mobility Impaired (Adult and Pediatric)  Goal: *Acute Goals and Plan of Care (Insert Text)  Description: FUNCTIONAL STATUS PRIOR TO ADMISSION: Patient was ambulatory with rollator walker at baseline. Has history of falls. HOME SUPPORT PRIOR TO ADMISSION: The patient lived alone with family across the street to provide assistance. Physical Therapy Goals  Initiated 2/8/2021  1. Patient will move from supine to sit and sit to supine  in bed with maximal assistance within 7 day(s). 2.  Patient will transfer from bed to chair and chair to bed with maximal assistance x 2 using the least restrictive device within 7 day(s). 3.  Patient will perform sit to stand with maximal assistance x 2 within 7 day(s). 4.  Patient will complete HEP with modA within 7 days. Outcome: Progressing Towards Goal    PHYSICAL THERAPY TREATMENT  Patient: Stef Vidales (28 y.o. female)  Date: 2/11/2021  Diagnosis: Hip fracture ([facility] [address] [S72.009A] <principal problem not specified>  Procedure(s) (LRB):  RIGHT HIP CONVERSION TO ARTHROPLASTY TOTAL ANTERIOR APPROACH (Right) 5 Days Post-Op  Precautions: Fall, NWB  Chart, physical therapy assessment, plan of care and goals were reviewed. ASSESSMENT  Patient continues with skilled PT services and is progressing towards goals. Pt received EOB with OT and willing to work with therapy. Pt displays some confusion during the session and limited by pain as well. Pt was able to tolerate lateral transfer to [facility] with Max VC for hand placement and NWB for RLE. Pt attempted a STS from [facility] with Mod A x2 with barely clearing her bottom and attempting to WB with RLE. Pt was assisted back to bed from [facility], and completed the session supine with HOB elevated with call bell within reach and all needs met with family in room as well. Rn notified of session. Current Level of Function Impacting Discharge (mobility/balance): Max/Mod A x2 for bed mobility and lateral transfers.      Other factors to consider for discharge: decline in function with therapy, confusion         PLAN :  Patient continues to benefit from skilled intervention to address the above impairments. Continue treatment per established plan of care. to address goals. Recommendation for discharge: (in order for the patient to meet his/her long term goals)  Physical therapy at least 2 days/week in the home     This discharge recommendation:  Has not yet been discussed the attending provider and/or case management    IF patient discharges home will need the following DME: patient owns DME required for discharge       SUBJECTIVE:   Patient stated I have to put my foot back so I can stand.     OBJECTIVE DATA SUMMARY:   Critical Behavior:  Neurologic State: Alert, Confused  Orientation Level: Oriented X4  Cognition: Follows commands  Safety/Judgement: Fall prevention, Awareness of environment, Decreased insight into deficits  Functional Mobility Training:  Bed Mobility:  Supine to Sit: Assist x2; Moderate assistance  Sit to Supine: Assist x2;Maximum assistance     Transfers:  Sit to Stand: Assist x2; Moderate assistance(with A for R LE NWB)     Balance:  Sitting: Intact  Standing: Impaired; With support  Standing - Static: Constant support;Poor    Pain Rating:  No pain reported    Activity Tolerance:   Fair      After treatment patient left in no apparent distress:   Supine in bed, Call bell within reach, and Caregiver / family present    COMMUNICATION/COLLABORATION:   The patients plan of care was discussed with: Registered nurse.      Geno Warner PTA   Time Calculation: 13 mins

## 2021-02-11 NOTE — PROGRESS NOTES
Miko Lozano, Ivan Michel, and Ihsan Dodd Date: 2/5/2021      Subjective:     Patient confused yesterday. Worked better with PT and ate better yesterday. .       Current Facility-Administered Medications   Medication Dose Route Frequency    ceFAZolin (ANCEF) 2 g in sterile water (preservative free) 20 mL IV syringe  2 g IntraVENous Q8H    0.9% sodium chloride infusion 250 mL  250 mL IntraVENous PRN    famotidine (PEPCID) tablet 20 mg  20 mg Oral QHS    acetaminophen (TYLENOL) tablet 650 mg  650 mg Oral Q6H PRN    sodium chloride (NS) flush 5-40 mL  5-40 mL IntraVENous PRN    escitalopram oxalate (LEXAPRO) tablet 10 mg  10 mg Oral DAILY    sodium chloride (NS) flush 5-40 mL  5-40 mL IntraVENous Q8H    naloxone (NARCAN) injection 0.4 mg  0.4 mg IntraVENous PRN    calcium-vitamin D (OS-CARLOS +D3) 500 mg-200 unit per tablet 1 Tab  1 Tab Oral TID WITH MEALS    senna-docusate (PERICOLACE) 8.6-50 mg per tablet 1 Tab  1 Tab Oral BID    polyethylene glycol (MIRALAX) packet 17 g  17 g Oral DAILY    bisacodyL (DULCOLAX) suppository 10 mg  10 mg Rectal DAILY PRN    oxyCODONE IR (ROXICODONE) tablet 2.5 mg  2.5 mg Oral Q4H PRN    enoxaparin (LOVENOX) injection 30 mg  30 mg SubCUTAneous Q12H    acetaminophen (TYLENOL) tablet 650 mg  650 mg Oral Q6H    lidocaine 4 % patch 1 Patch  1 Patch TransDERmal Q24H    traMADoL (ULTRAM) tablet 50 mg  50 mg Oral Q6H PRN          Objective:     No data found. No intake/output data recorded. 02/09 1901 - 02/11 0700  In: 50 [P.O.:50]  Out: 1300 [Urine:1300]    Physical Exam: Lungs: clear to auscultation bilaterally  Heart: regular rate and rhythm, S1, S2 normal, no murmur, click, rub or gallop  Abdomen: soft, non-tender.  Bowel sounds normal. No masses,  no organomegaly        Data Review   Recent Results (from the past 24 hour(s))   POC INR    Collection Time: 02/11/21 10:46 AM   Result Value Ref Range    INR (POC) 1.1 <1.2             Assessment:     Active Problems:    GERD (gastroesophageal reflux disease) ()      Hip fracture ([facility] [address] (11/14/2020)      Mild dementia ([facility] [address] (2/6/2021)        Plan:     1) Still need to get hospital bed into her house. 2) Confusion still a problem- hope more time post anesthesia will help that clear.   3) Continue to work with PT  4) Trying to get a home aid  5) sit up for eating  6) Get ford out tomorrow

## 2021-02-12 PROCEDURE — 77030019905 HC CATH URETH INTMIT MDII -A

## 2021-02-12 PROCEDURE — 74011250636 HC RX REV CODE- 250/636: Performed by: STUDENT IN AN ORGANIZED HEALTH CARE EDUCATION/TRAINING PROGRAM

## 2021-02-12 PROCEDURE — 74011250636 HC RX REV CODE- 250/636: Performed by: PHYSICIAN ASSISTANT

## 2021-02-12 PROCEDURE — 97535 SELF CARE MNGMENT TRAINING: CPT | Performed by: OCCUPATIONAL THERAPIST

## 2021-02-12 PROCEDURE — 74011250637 HC RX REV CODE- 250/637: Performed by: STUDENT IN AN ORGANIZED HEALTH CARE EDUCATION/TRAINING PROGRAM

## 2021-02-12 PROCEDURE — 97530 THERAPEUTIC ACTIVITIES: CPT | Performed by: OCCUPATIONAL THERAPIST

## 2021-02-12 PROCEDURE — 97530 THERAPEUTIC ACTIVITIES: CPT

## 2021-02-12 PROCEDURE — 51798 US URINE CAPACITY MEASURE: CPT

## 2021-02-12 PROCEDURE — 74011250637 HC RX REV CODE- 250/637: Performed by: INTERNAL MEDICINE

## 2021-02-12 PROCEDURE — 65270000029 HC RM PRIVATE

## 2021-02-12 PROCEDURE — 77030038269 HC DRN EXT URIN PURWCK BARD -A

## 2021-02-12 PROCEDURE — 74011000250 HC RX REV CODE- 250: Performed by: INTERNAL MEDICINE

## 2021-02-12 PROCEDURE — 74011000250 HC RX REV CODE- 250: Performed by: PHYSICIAN ASSISTANT

## 2021-02-12 RX ADMIN — WATER 2 G: 1 INJECTION INTRAMUSCULAR; INTRAVENOUS; SUBCUTANEOUS at 22:15

## 2021-02-12 RX ADMIN — ACETAMINOPHEN 650 MG: 325 TABLET ORAL at 18:52

## 2021-02-12 RX ADMIN — ACETAMINOPHEN 650 MG: 325 TABLET ORAL at 23:16

## 2021-02-12 RX ADMIN — ENOXAPARIN SODIUM 30 MG: 30 INJECTION SUBCUTANEOUS at 14:55

## 2021-02-12 RX ADMIN — ENOXAPARIN SODIUM 30 MG: 30 INJECTION SUBCUTANEOUS at 02:25

## 2021-02-12 RX ADMIN — FAMOTIDINE 20 MG: 20 TABLET ORAL at 22:37

## 2021-02-12 RX ADMIN — WATER 2 G: 1 INJECTION INTRAMUSCULAR; INTRAVENOUS; SUBCUTANEOUS at 06:14

## 2021-02-12 RX ADMIN — Medication 1 TABLET: at 13:00

## 2021-02-12 RX ADMIN — Medication 10 ML: at 23:17

## 2021-02-12 RX ADMIN — DOCUSATE SODIUM 50 MG AND SENNOSIDES 8.6 MG 1 TABLET: 8.6; 5 TABLET, FILM COATED ORAL at 18:00

## 2021-02-12 RX ADMIN — ACETAMINOPHEN 650 MG: 325 TABLET ORAL at 09:18

## 2021-02-12 RX ADMIN — Medication 1 TABLET: at 09:18

## 2021-02-12 RX ADMIN — DOCUSATE SODIUM 50 MG AND SENNOSIDES 8.6 MG 1 TABLET: 8.6; 5 TABLET, FILM COATED ORAL at 09:18

## 2021-02-12 RX ADMIN — Medication 10 ML: at 14:57

## 2021-02-12 RX ADMIN — WATER 2 G: 1 INJECTION INTRAMUSCULAR; INTRAVENOUS; SUBCUTANEOUS at 14:02

## 2021-02-12 NOTE — PROGRESS NOTES
Miko Mullins, Joshua Ghosh and Gaetana Dao Date: 2/5/2021      Subjective:     Patient awake, alert, and talkative. Feeling OK. Eating adequately. Good pain control  Intolerant to Lexapro- have D/Oniel. .. Current Facility-Administered Medications   Medication Dose Route Frequency    ceFAZolin (ANCEF) 2 g in sterile water (preservative free) 20 mL IV syringe  2 g IntraVENous Q8H    0.9% sodium chloride infusion 250 mL  250 mL IntraVENous PRN    famotidine (PEPCID) tablet 20 mg  20 mg Oral QHS    acetaminophen (TYLENOL) tablet 650 mg  650 mg Oral Q6H PRN    sodium chloride (NS) flush 5-40 mL  5-40 mL IntraVENous PRN    sodium chloride (NS) flush 5-40 mL  5-40 mL IntraVENous Q8H    naloxone (NARCAN) injection 0.4 mg  0.4 mg IntraVENous PRN    calcium-vitamin D (OS-CARLOS +D3) 500 mg-200 unit per tablet 1 Tab  1 Tab Oral TID WITH MEALS    senna-docusate (PERICOLACE) 8.6-50 mg per tablet 1 Tab  1 Tab Oral BID    polyethylene glycol (MIRALAX) packet 17 g  17 g Oral DAILY    bisacodyL (DULCOLAX) suppository 10 mg  10 mg Rectal DAILY PRN    oxyCODONE IR (ROXICODONE) tablet 2.5 mg  2.5 mg Oral Q4H PRN    enoxaparin (LOVENOX) injection 30 mg  30 mg SubCUTAneous Q12H    acetaminophen (TYLENOL) tablet 650 mg  650 mg Oral Q6H    lidocaine 4 % patch 1 Patch  1 Patch TransDERmal Q24H    traMADoL (ULTRAM) tablet 50 mg  50 mg Oral Q6H PRN          Objective:     Patient Vitals for the past 8 hrs:   BP Temp Pulse Resp SpO2 Weight   02/12/21 0906 (!) 168/64 98 °F (36.7 °C) 78 17 96 %    02/12/21 0309      129 lb 4.8 oz (58.7 kg)     No intake/output data recorded. 02/10 1901 - 02/12 0700  In: 320 [P.O.:320]  Out: 2700 [Urine:2700]    Physical Exam: Lungs: clear to auscultation bilaterally  Heart: regular rate and rhythm, S1, S2 normal, no murmur, click, rub or gallop  Abdomen: soft, non-tender.  Bowel sounds normal. No masses,  no organomegaly        Data Review   Recent Results (from the past 24 hour(s))   POC INR    Collection Time: 02/11/21 10:46 AM   Result Value Ref Range    INR (POC) 1.1 <1.2             Assessment:     Active Problems:    GERD (gastroesophageal reflux disease) ()      [facility] ([facility] (11/14/2020)      Mild dementia ([facility] (2/6/2021)        Plan:     1) Cont to work with PT- needs to be able to get up, pivot, and get to potty chair  2) Working on home hospital bed  3) Working on getting home aid to assist with ADLs  Plan discharge early next week.

## 2021-02-12 NOTE — ROUTINE PROCESS
Bedside shift change report given to Enmanuel Carias (oncoming nurse) by Edith Villaseñor (offgoing nurse). Report included the following information SBAR, Kardex, MAR and Recent Results.

## 2021-02-12 NOTE — PROGRESS NOTES
EDYTA: Plan for discharge home with family early next week per [facility] has accepted patient for [facility] PT, nursing and HH aid. Order for wheelchair and platform drop arm bedside commode pending with [facility].  [facility] is not doing deliveries today due to the weather. Family has been provided with list of private duty caregivers and places they can rent a hospital bed for home as the patient does not qualify under insurance. BLS transport at discharge. Chart reviewed. CM obtained MD signature on Detailed Written order for DME and sent to [employer]. Care management will follow.     BLAYNE Adame/CRM

## 2021-02-12 NOTE — PROGRESS NOTES
Bedside shift change report given to Annika ISAACS (oncoming nurse) by Lorena Bacon (offgoing nurse). Report included the following information SBAR, Kardex, Intake/Output and MAR.

## 2021-02-12 NOTE — PROGRESS NOTES
Problem: Self Care Deficits Care Plan (Adult)  Goal: *Acute Goals and Plan of Care (Insert Text)  Description: FUNCTIONAL STATUS PRIOR TO ADMISSION: Patient was ambulatory with rollator walker at baseline. Pt was mod I to supervision level for ADLs with assistance required for bathing. Has history of falls. Had ORIF Nov 2020 and is receiving HH therapy: had just recently progressed to a few hours a day alone. Using [facility] [id number] SUPPORT PRIOR TO ADMISSION: The patient lived alone with family across the street to provide assistance. All family has job with somewhat flexible hours but patient will need 24/7 care at discharge; Daughter Yifan Villalpando  aware that 24/7 is needed. Occupational Therapy Goals  Initiated 2/8/2021  1. Patient will perform grooming, seated EOB unsupported, with supervision/set-up within 7 day(s). 2.  Patient will perform upper body dressing with supervision/set-up within 7 day(s). 3.  Patient will perform anterior bathing from neck to thighs, seated EOB, with minimal assistance within 7 day(s). 4.  Patient will perform toilet transfers to [facility] with maximal assistance within 7 day(s). 5.  Patient will participate in upper extremity therapeutic exercise/activities with minimal assistance/contact guard assist for 5 minutes within 7 day(s). Outcome: Progressing Towards Goal     OCCUPATIONAL THERAPY TREATMENT  Patient: Vic Fong (89 y.o. female)  Date: 2/12/2021  Diagnosis: Hip fracture ([facility] [address] [S72.009A] <principal problem not specified>  Procedure(s) (LRB):  RIGHT HIP CONVERSION TO ARTHROPLASTY TOTAL ANTERIOR APPROACH (Right) 6 Days Post-Op  Precautions: Fall, NWB  Chart, occupational therapy assessment, plan of care, and goals were reviewed. ASSESSMENT  Patient continues with skilled OT services and is progressing towards goals. She demonstrated improved cognition, mobility and required less physical assist this session.   She continues to be unable to follow RLE NWB precautions. Pt will have 24/7 assist at home and has all equipment needed for safety. Recommend HH at discharge. Current Level of Function Impacting Discharge (ADLs): max A LE ADLs, total A toileting, mod A squat pivot transfers    Other factors to consider for discharge: see above         PLAN :  Patient continues to benefit from skilled intervention to address the above impairments. Continue treatment per established plan of care. to address goals. Recommend with staff: lateral transfers to drop arm [facility] for toileting    Recommend next OT session: NWB RLE, LE ADLs with AE PRN    Recommendation for discharge: (in order for the patient to meet his/her long term goals)  Occupational therapy at least 2 days/week in the home AND ensure assist and/or supervision for safety     This discharge recommendation:  Has been made in collaboration with the attending provider and/or case management    IF patient discharges home will need the following DME: patient owns DME required for discharge       SUBJECTIVE:   Patient stated Severiano Grieves will be fine at home.     OBJECTIVE DATA SUMMARY:   Cognitive/Behavioral Status:  Neurologic State: Alert  Orientation Level: Oriented to person;Oriented to place;Oriented to situation  Cognition: Decreased attention/concentration; Follows commands  Perception: Appears intact  Perseveration: No perseveration noted  Safety/Judgement: Awareness of environment;Decreased insight into deficits; Decreased awareness of need for safety; Fall prevention    Functional Mobility and Transfers for ADLs:  Bed Mobility:  Scooting: Minimum assistance; Additional time;Assist x1    Transfers:  Sit to Stand: Maximum assistance; Additional time;Assist x1(pt unable to follow RLE NWB- squats well with min A)  Functional Transfers  Toilet Transfer : Moderate assistance; Additional time;Assist x1(squat pivot transfer to [facility]- pt unable to follow RLE NWB)  Cues: Physical assistance; Tactile cues provided;Verbal cues provided;Visual cues provided  Adaptive Equipment: Bedside commode  Bed to Chair: Moderate assistance; Additional time;Assist x1(squat pivot transfer- pt unable to follow RLE NWB)    Balance:  Sitting: Intact  Standing: Impaired; With support(NWB RLE)  Standing - Static: Poor;Constant support  Standing - Dynamic : Poor;Constant support    ADL Intervention:  Lower Body Dressing Assistance  Dressing Assistance: Total assistance(dependent)  Protective Undergarmet: Total assistance (dependent)  Socks: Total assistance (dependent)  Leg Crossed Method Used: No  Position Performed: Bending forward method;Seated in chair;Standing(as she would do at home)  Cues: Don;Physical assistance; Tactile cues provided;Verbal cues provided;Visual cues provided    Toileting  Toileting Assistance: Total assistance(dependent)  Bladder Hygiene: Minimum assistance  Bowel Hygiene: Total assistance (dependent)  Clothing Management: Total assistance (dependent)  Cues: Tactile cues provided;Verbal cues provided;Visual cues provided    Cognitive Retraining  Orientation Retraining: Reorienting  Safety/Judgement: Awareness of environment;Decreased insight into deficits; Decreased awareness of need for safety; Fall prevention    Patient recalled and demonstrated avoiding extreme planes of movement with Right LE during ADLs and functional mobility with max cues. Bathing: Patient instructed when bathing to not submerge wound in water, stand to shower or sponge bathe, cover wound with plastic and tape to ensure no water reaches bandage/wound without cues. Patient indicated understanding. Dressing joint: Patient instructed and demonstrated to don/doff Right LE first/last max cues.   Patient instructed and demonstrated to don all clothing while sitting prior to standing, doff all clothing to knees while standing, then sit to doff clothing off from knees to feet in order to facilitate fall prevention, pain management, and energy conservation with Maximum assistance. Home safety: Patient instructed on home modifications and safety (raise height of ADL objects, appropriate height of chair surfaces, recliner safety, change of floor surfaces, clear pathways) to increase independence and fall prevention. Patient indicated understanding. Standing: Patient instructed and demonstrated to walk up to sink/counter top/surfaces, step into walker to increase safety of joint and fall prevention with Maximum assistance. Patient educated about hip anatomy verbally and with pictures and educated to avoid internal rotation of Right LE. Instructed to apply concept to ADLs within the home (no reaching across body to Right side, square off while using objects, slide objects along surfaces). Patient instructed to increase amount of time standing, observe standing position during ADLs in order to increase even weight bearing through bilateral LEs in order to increase independence with ADLs. Goal to be reached 30 days post - op, per orthopedic surgeon or per PT. Patient indicated understanding. Tub transfer: Patient instructed regarding when it is safe to begin transfer into tub (complete stairs with PT, advance exercises with PT high enough to clear tub height). Patient instructed to use the same technique as used with stairs when entering and exiting tub (\"up with the non-surgical, down with the surgical leg\"). Patient indicated understanding. Pain:  4/10    Activity Tolerance:   Fair and requires frequent rest breaks    After treatment patient left in no apparent distress:   Sitting in chair, Call bell within reach, and Caregiver / family present    COMMUNICATION/COLLABORATION:   The patients plan of care was discussed with: Physical therapy assistant and Registered nurse.      Michelle Grande OT  Time Calculation: 25 mins

## 2021-02-12 NOTE — PROGRESS NOTES
Bedside and Verbal shift change report given to Sondra Kyle (oncoming nurse) by Jose A Hernández (offgoing nurse). Report included the following information SBAR.

## 2021-02-12 NOTE — PROGRESS NOTES
Problem: Mobility Impaired (Adult and Pediatric)  Goal: *Acute Goals and Plan of Care (Insert Text)  Description: FUNCTIONAL STATUS PRIOR TO ADMISSION: Patient was ambulatory with rollator walker at baseline. Has history of falls. HOME SUPPORT PRIOR TO ADMISSION: The patient lived alone with family across the street to provide assistance. Physical Therapy Goals  Initiated 2/8/2021  1. Patient will move from supine to sit and sit to supine  in bed with maximal assistance within 7 day(s). 2.  Patient will transfer from bed to chair and chair to bed with maximal assistance x 2 using the least restrictive device within 7 day(s). 3.  Patient will perform sit to stand with maximal assistance x 2 within 7 day(s). 4.  Patient will complete HEP with modA within 7 days. Outcome: Progressing Towards Goal     PHYSICAL THERAPY TREATMENT  Patient: Elian Gallo (67 y.o. female)  Date: 2/12/2021  Diagnosis: Hip fracture ([facility] [S72.009A] <principal problem not specified>  Procedure(s) (LRB):  RIGHT HIP CONVERSION TO ARTHROPLASTY TOTAL ANTERIOR APPROACH (Right) 6 Days Post-Op  Precautions: Fall, NWB  Chart, physical therapy assessment, plan of care and goals were reviewed. ASSESSMENT  Patient continues with skilled PT services and is slowly  progressing towards goals. Pt received seated in recliner and willing to work with therapy. Pt's awareness more intact today from pervious session. Pt was able to tolerate a squat pivot transfer to the [facility], and from [facility] to bed with Mod A, with pt R foot on therapist foot to ensure following NWB. During squat pivot transfer it was noted that pt continues to attempt to bear some weight through RLE. Pt completed session supine in bed with call bell within reach and all needs met with family in room. Current Level of Function Impacting Discharge (mobility/balance):  Max A with bed moblitiy, Min A with additional time for scooting to [facility], Mod A for squat pivot transfer    Other factors to consider for discharge: deconditioning, fall risk, safety awarness         PLAN :  Patient continues to benefit from skilled intervention to address the above impairments. Continue treatment per established plan of care. to address goals. Recommendation for discharge: (in order for the patient to meet his/her long term goals)  Physical therapy at least 2 days/week in the home     This discharge recommendation:  Has not yet been discussed the attending provider and/or case management    IF patient discharges home will need the following DME: patient owns DME required for discharge       SUBJECTIVE:   Patient stated Elham Lie you for your instruction.     OBJECTIVE DATA SUMMARY:   Critical Behavior:  Neurologic State: Alert  Orientation Level: Oriented to person, Oriented to place, Oriented to situation  Cognition: Decreased attention/concentration, Follows commands  Safety/Judgement: Awareness of environment, Decreased insight into deficits, Decreased awareness of need for safety, Fall prevention  Functional Mobility Training:  Bed Mobility:  Sit to Supine: Maximum assistance; Additional time  Scooting: Minimum assistance; Additional time     Transfers:  Bed to Chair: Moderate assistance; Additional time(squat pivot due to no able to follow NWB with RLE)    Balance:  Sitting: Intact  Standing: Impaired  Standing - Static: Constant support;Poor  Standing - Dynamic : Constant support;Poor        Activity Tolerance:   Fair      After treatment patient left in no apparent distress:   Supine in bed, Call bell within reach and Caregiver / family present    COMMUNICATION/COLLABORATION:   The patients plan of care was discussed with: Registered nurse.      Coral Warner   Time Calculation: 36 mins

## 2021-02-13 LAB
INR PPP: 1 (ref 0.9–1.1)
PROTHROMBIN TIME: 10.8 SEC (ref 9–11.1)

## 2021-02-13 PROCEDURE — 85610 PROTHROMBIN TIME: CPT

## 2021-02-13 PROCEDURE — 65270000029 HC RM PRIVATE

## 2021-02-13 PROCEDURE — 74011250636 HC RX REV CODE- 250/636: Performed by: STUDENT IN AN ORGANIZED HEALTH CARE EDUCATION/TRAINING PROGRAM

## 2021-02-13 PROCEDURE — 74011000250 HC RX REV CODE- 250: Performed by: PHYSICIAN ASSISTANT

## 2021-02-13 PROCEDURE — 74011250637 HC RX REV CODE- 250/637: Performed by: STUDENT IN AN ORGANIZED HEALTH CARE EDUCATION/TRAINING PROGRAM

## 2021-02-13 PROCEDURE — 36415 COLL VENOUS BLD VENIPUNCTURE: CPT

## 2021-02-13 PROCEDURE — 74011250636 HC RX REV CODE- 250/636: Performed by: PHYSICIAN ASSISTANT

## 2021-02-13 PROCEDURE — 77030038269 HC DRN EXT URIN PURWCK BARD -A

## 2021-02-13 PROCEDURE — 74011250637 HC RX REV CODE- 250/637: Performed by: INTERNAL MEDICINE

## 2021-02-13 PROCEDURE — 97530 THERAPEUTIC ACTIVITIES: CPT

## 2021-02-13 RX ADMIN — ACETAMINOPHEN 650 MG: 325 TABLET ORAL at 10:02

## 2021-02-13 RX ADMIN — ENOXAPARIN SODIUM 30 MG: 30 INJECTION SUBCUTANEOUS at 07:19

## 2021-02-13 RX ADMIN — ENOXAPARIN SODIUM 30 MG: 30 INJECTION SUBCUTANEOUS at 18:05

## 2021-02-13 RX ADMIN — Medication 10 ML: at 07:17

## 2021-02-13 RX ADMIN — WATER 2 G: 1 INJECTION INTRAMUSCULAR; INTRAVENOUS; SUBCUTANEOUS at 22:20

## 2021-02-13 RX ADMIN — DOCUSATE SODIUM 50 MG AND SENNOSIDES 8.6 MG 1 TABLET: 8.6; 5 TABLET, FILM COATED ORAL at 18:05

## 2021-02-13 RX ADMIN — DOCUSATE SODIUM 50 MG AND SENNOSIDES 8.6 MG 1 TABLET: 8.6; 5 TABLET, FILM COATED ORAL at 10:02

## 2021-02-13 RX ADMIN — WATER 2 G: 1 INJECTION INTRAMUSCULAR; INTRAVENOUS; SUBCUTANEOUS at 13:57

## 2021-02-13 RX ADMIN — ACETAMINOPHEN 650 MG: 325 TABLET ORAL at 18:04

## 2021-02-13 RX ADMIN — WATER 2 G: 1 INJECTION INTRAMUSCULAR; INTRAVENOUS; SUBCUTANEOUS at 06:20

## 2021-02-13 RX ADMIN — Medication 10 ML: at 21:23

## 2021-02-13 NOTE — PROGRESS NOTES
Progress Note    Patient: Kamari Rios MRN: [id number]  SSN: [id number]    YOB: 1928  Age: 80 y.o. Sex: female      Admit Date: 2/5/2021    LOS: 8 days     Subjective:     POD#6  s/p conversion R ROMAIN. Patient eating dinner. Tolerating PO well. Worked with PT today. Feels like pain is improving. Did c/o a \"burning\" sensation in her anterolateral thigh that was present with PT yesterday, but is resolving. Objective:     Vitals:    02/12/21 0309 02/12/21 0906 02/12/21 1627 02/12/21 2134   BP:  (!) 168/64 (!) 146/70 (!) 159/56   Pulse:  78 85 85   Resp:  17 16 16   Temp:  98 °F (36.7 °C) 98.1 °F (36.7 °C) 98.4 °F (36.9 °C)   SpO2:  96% 96% 97%   Weight: 58.7 kg (129 lb 4.8 oz)      Height:            Intake and Output:  Current Shift: 02/13 0701 - 02/13 1900  In: -   Out: 600 [Urine:600]  Last three shifts: 02/11 1901 - 02/13 0700  In: 400 [P.O.:400]  Out: 2100 [Urine:2100]    Physical Exam:   Gen: Alert to name, can follow basic commands  RLE: +EHL/FHL, can fire quads but difficulty with straight leg raise 2/2 pain. Toes WWP. Abduction pillow in place Surgical incision c/d/i, well approximated with nylons. No drainage or periwound erythema. Lab/Data Review:  Hgb:  8.2     Assessment:   81 yo female s/p conversion R ROMAIN (DOS 2/6/21)     Plan:     -NWB RLE (ok for foot flat/foot on ground with a walker, no direct transfers on RLE)  -IV ancef until 2/14 (patient cannot tolerate PO keflex)  -PT/OT eval/treat: NWB RLE  -Progress diet   -CM for dispo planning (family wishes to go home w/ HH)  -Pain control per primary  -Monitor R LFCN pain  -IS 10x/hr/day when awake  -Dressing change qday per nursing w/ dry gauze/tape only.  Bolster pannus as needed with ABD pads      Signed By: Karishma Marsh MD     February 13, 2021

## 2021-02-13 NOTE — PROGRESS NOTES
Mildred Wei, Leeroy Mandujano    Admit Date: 2/5/2021    Subjective:     Pt sleeping comfortably, and her dtr asked me not to awaken her. No new issues reported. Current Facility-Administered Medications   Medication Dose Route Frequency    ceFAZolin (ANCEF) 2 g in sterile water (preservative free) 20 mL IV syringe  2 g IntraVENous Q8H    0.9% sodium chloride infusion 250 mL  250 mL IntraVENous PRN    famotidine (PEPCID) tablet 20 mg  20 mg Oral QHS    acetaminophen (TYLENOL) tablet 650 mg  650 mg Oral Q6H PRN    sodium chloride (NS) flush 5-40 mL  5-40 mL IntraVENous PRN    sodium chloride (NS) flush 5-40 mL  5-40 mL IntraVENous Q8H    naloxone (NARCAN) injection 0.4 mg  0.4 mg IntraVENous PRN    calcium-vitamin D (OS-CARLOS +D3) 500 mg-200 unit per tablet 1 Tab  1 Tab Oral TID WITH MEALS    senna-docusate (PERICOLACE) 8.6-50 mg per tablet 1 Tab  1 Tab Oral BID    polyethylene glycol (MIRALAX) packet 17 g  17 g Oral DAILY    bisacodyL (DULCOLAX) suppository 10 mg  10 mg Rectal DAILY PRN    oxyCODONE IR (ROXICODONE) tablet 2.5 mg  2.5 mg Oral Q4H PRN    enoxaparin (LOVENOX) injection 30 mg  30 mg SubCUTAneous Q12H    acetaminophen (TYLENOL) tablet 650 mg  650 mg Oral Q6H    lidocaine 4 % patch 1 Patch  1 Patch TransDERmal Q24H    traMADoL (ULTRAM) tablet 50 mg  50 mg Oral Q6H PRN          Objective:     No data found. 02/13 0701 - 02/13 1900  In: -   Out: 600 [Urine:600]  02/11 1901 - 02/13 0700  In: 400 [P.O.:400]  Out: 2100 [Urine:2100]    Physical Exam: NAD. Sleeping comfortably.           Data Review   Recent Results (from the past 24 hour(s))   PROTHROMBIN TIME + INR    Collection Time: 02/13/21  7:11 AM   Result Value Ref Range    INR 1.0 0.9 - 1.1      Prothrombin time 10.8 9.0 - 11.1 sec           Assessment:     Principal Problem:    Hip fracture -- S/p ORIF 2/6      Active Problems:    GERD (gastroesophageal reflux disease) ()      Mild dementia ([facility] (2/6/2021)        Plan:     1. Post-op care & DVT prophylaxis per ortho. 2. Dispo -- Home early next week with HH/PT, etc.      D/w dtr, present.         Anival Bergeron MD

## 2021-02-13 NOTE — PROGRESS NOTES
Problem: Mobility Impaired (Adult and Pediatric)  Goal: *Acute Goals and Plan of Care (Insert Text)  Description: FUNCTIONAL STATUS PRIOR TO ADMISSION: Patient was ambulatory with rollator walker at baseline. Has history of falls. HOME SUPPORT PRIOR TO ADMISSION: The patient lived alone with family across the street to provide assistance. Physical Therapy Goals  Initiated 2/8/2021  1. Patient will move from supine to sit and sit to supine  in bed with maximal assistance within 7 day(s). 2.  Patient will transfer from bed to chair and chair to bed with maximal assistance x 2 using the least restrictive device within 7 day(s). 3.  Patient will perform sit to stand with maximal assistance x 2 within 7 day(s). 4.  Patient will complete HEP with modA within 7 days. Outcome: Progressing Towards Goal    PHYSICAL THERAPY TREATMENT  Patient: Kristel Tan (11 y.o. female)  Date: 2/13/2021  Diagnosis: Hip fracture ([facility] [S72.009A] Hip fracture ([facility] [address]  Procedure(s) (LRB):  RIGHT HIP CONVERSION TO ARTHROPLASTY TOTAL ANTERIOR APPROACH (Right) 7 Days Post-Op  Precautions: Fall, NWB  Chart, physical therapy assessment, plan of care and goals were reviewed. ASSESSMENT  Patient continues with skilled PT services and is slowly progressing towards goals. Pt received supine in bed and willing to work with therapy. Pt showed improvement from last session with bed mobilitiy needing light mod/Min A for supine to sit EOB noted using LLE to assist with scooting. Pt was able to tolerate lateral transfer to drop arm recliner with Mod A  and VC for forward leaning, pt seemed to be more aware of not WB with RLE. Noted pt able to clear her bottom with UE support during transfer. Pt completed session seated in recliner with call bell within reach and all needs met at the time with family in room for majority of session. Rn notified of session.      Current Level of Function Impacting Discharge (mobility/balance): mod/min with bed mobiltiy with additional time and lateral transfer    Other factors to consider for discharge: generalized weakness, low tolerance for activity, adherence to [address] status         PLAN :  Patient continues to benefit from skilled intervention to address the above impairments. Continue treatment per established plan of care. to address goals. Recommendation for discharge: (in order for the patient to meet his/her long term goals)  Physical therapy at least 2 days/week in the home     This discharge recommendation:  Has not yet been discussed the attending provider and/or case management    IF patient discharges home will need the following DME: patient owns DME required for discharge       SUBJECTIVE:   Patient stated im gonna do everything I can.     OBJECTIVE DATA SUMMARY:   Critical Behavior:  Neurologic State: Alert, Appropriate for age  Orientation Level: Oriented to situation, Oriented to place, Disoriented X4  Cognition: Follows commands  Safety/Judgement: Awareness of environment, Decreased insight into deficits, Decreased awareness of need for safety, Fall prevention  Functional Mobility Training:  Bed Mobility:  Supine to Sit: Moderate assistance;Minimum assistance; Additional time  Scooting: Minimum assistance; Additional time    Transfers:  Bed to Chair: Moderate assistance; Additional time(lateral transfer )    Balance:  Sitting: Intact  Standing: Impaired  Standing - Static: Constant support;Poor  Standing - Dynamic : Constant support;Poor    Pain Rating:  Visible high pain with activity no # given    Activity Tolerance:   Good and Fair      After treatment patient left in no apparent distress:   Sitting in chair, Call bell within reach, and Caregiver / family present    COMMUNICATION/COLLABORATION:   The patients plan of care was discussed with: Registered nurse.      Libertad Warner PTA   Time Calculation: 17 mins

## 2021-02-14 ENCOUNTER — APPOINTMENT (OUTPATIENT)
Dept: VASCULAR SURGERY | Age: 86
DRG: 522 | End: 2021-02-14
Attending: INTERNAL MEDICINE
Payer: MEDICARE

## 2021-02-14 PROCEDURE — 74011250637 HC RX REV CODE- 250/637: Performed by: STUDENT IN AN ORGANIZED HEALTH CARE EDUCATION/TRAINING PROGRAM

## 2021-02-14 PROCEDURE — 74011000250 HC RX REV CODE- 250: Performed by: PHYSICIAN ASSISTANT

## 2021-02-14 PROCEDURE — 74011000250 HC RX REV CODE- 250: Performed by: INTERNAL MEDICINE

## 2021-02-14 PROCEDURE — 93971 EXTREMITY STUDY: CPT

## 2021-02-14 PROCEDURE — 65270000029 HC RM PRIVATE

## 2021-02-14 PROCEDURE — 74011250636 HC RX REV CODE- 250/636: Performed by: STUDENT IN AN ORGANIZED HEALTH CARE EDUCATION/TRAINING PROGRAM

## 2021-02-14 PROCEDURE — 97530 THERAPEUTIC ACTIVITIES: CPT

## 2021-02-14 PROCEDURE — 74011250636 HC RX REV CODE- 250/636: Performed by: PHYSICIAN ASSISTANT

## 2021-02-14 PROCEDURE — 77030038269 HC DRN EXT URIN PURWCK BARD -A

## 2021-02-14 RX ADMIN — DOCUSATE SODIUM 50 MG AND SENNOSIDES 8.6 MG 1 TABLET: 8.6; 5 TABLET, FILM COATED ORAL at 09:33

## 2021-02-14 RX ADMIN — ENOXAPARIN SODIUM 30 MG: 30 INJECTION SUBCUTANEOUS at 18:26

## 2021-02-14 RX ADMIN — Medication 10 ML: at 07:23

## 2021-02-14 RX ADMIN — ENOXAPARIN SODIUM 30 MG: 30 INJECTION SUBCUTANEOUS at 07:22

## 2021-02-14 RX ADMIN — DOCUSATE SODIUM 50 MG AND SENNOSIDES 8.6 MG 1 TABLET: 8.6; 5 TABLET, FILM COATED ORAL at 18:26

## 2021-02-14 RX ADMIN — Medication 10 ML: at 18:23

## 2021-02-14 RX ADMIN — WATER 2 G: 1 INJECTION INTRAMUSCULAR; INTRAVENOUS; SUBCUTANEOUS at 06:30

## 2021-02-14 NOTE — PROGRESS NOTES
Fatimahs Leeroy Avila    Admit Date: 2/5/2021    Subjective:     Pt doing OK, but she c/o some RLE edema that has developed over the past day or so. No other new c/o's. Current Facility-Administered Medications   Medication Dose Route Frequency    ceFAZolin (ANCEF) 2 g in sterile water (preservative free) 20 mL IV syringe  2 g IntraVENous Q8H    0.9% sodium chloride infusion 250 mL  250 mL IntraVENous PRN    famotidine (PEPCID) tablet 20 mg  20 mg Oral QHS    acetaminophen (TYLENOL) tablet 650 mg  650 mg Oral Q6H PRN    sodium chloride (NS) flush 5-40 mL  5-40 mL IntraVENous PRN    sodium chloride (NS) flush 5-40 mL  5-40 mL IntraVENous Q8H    naloxone (NARCAN) injection 0.4 mg  0.4 mg IntraVENous PRN    calcium-vitamin D (OS-CARLOS +D3) 500 mg-200 unit per tablet 1 Tab  1 Tab Oral TID WITH MEALS    senna-docusate (PERICOLACE) 8.6-50 mg per tablet 1 Tab  1 Tab Oral BID    polyethylene glycol (MIRALAX) packet 17 g  17 g Oral DAILY    bisacodyL (DULCOLAX) suppository 10 mg  10 mg Rectal DAILY PRN    oxyCODONE IR (ROXICODONE) tablet 2.5 mg  2.5 mg Oral Q4H PRN    enoxaparin (LOVENOX) injection 30 mg  30 mg SubCUTAneous Q12H    acetaminophen (TYLENOL) tablet 650 mg  650 mg Oral Q6H    lidocaine 4 % patch 1 Patch  1 Patch TransDERmal Q24H    traMADoL (ULTRAM) tablet 50 mg  50 mg Oral Q6H PRN          Objective:     Patient Vitals for the past 8 hrs:   BP Temp Pulse Resp SpO2 Weight   02/14/21 0938 120/68 98.4 °F (36.9 °C) 82 16 96 %    02/14/21 0741      117 lb 6.4 oz (53.3 kg)     02/14 0701 - 02/14 1900  In: -   Out: 600 [Urine:600]  02/12 1901 - 02/14 0700  In: 320 [P.O.:320]  Out: 1100 [Urine:1100]    Physical Exam: NAD. Sleeping comfortably. Neck -- Supple. Heart -- RRR with some ectopy. Lungs -- Grossly CTA. Abd -- Benign. Ext -- 1-2+ RLE edema with some mild tenderness at R calf. No significant LLE edema. Data Review   No results found for this or any previous visit (from the past 24 hour(s)). Assessment:     Principal Problem:    Hip fracture -- S/p ORIF 2/6      Active Problems:    GERD (gastroesophageal reflux disease) ()      Mild dementia ([facility] (2/6/2021)      RLE edema        Plan:     1. Post-op care & DVT prophylaxis per ortho. 2. RLE venous doppler being ordered to r/o DVT. 3. Dispo -- Probably home in the next day or two with HH/PT, etc.      D/w dtr, present.         Teena Hodges MD

## 2021-02-14 NOTE — PROGRESS NOTES
Problem: Mobility Impaired (Adult and Pediatric)  Goal: *Acute Goals and Plan of Care (Insert Text)  Description: FUNCTIONAL STATUS PRIOR TO ADMISSION: Patient was ambulatory with rollator walker at baseline. Has history of falls. HOME SUPPORT PRIOR TO ADMISSION: The patient lived alone with family across the street to provide assistance. Physical Therapy Goals  Initiated 2/8/2021  1. Patient will move from supine to sit and sit to supine  in bed with maximal assistance within 7 day(s). 2.  Patient will transfer from bed to chair and chair to bed with maximal assistance x 2 using the least restrictive device within 7 day(s). 3.  Patient will perform sit to stand with maximal assistance x 2 within 7 day(s). 4.  Patient will complete HEP with modA within 7 days. Outcome: Progressing Towards Goal    PHYSICAL THERAPY TREATMENT  Patient: Fabiana Trejo (89 y.o. female)  Date: 2/14/2021  Diagnosis: Hip fracture ([facility] [S72.009A] Hip fracture (HCC)  Procedure(s) (LRB):  RIGHT HIP CONVERSION TO ARTHROPLASTY TOTAL ANTERIOR APPROACH (Right) 8 Days Post-Op  Precautions: Fall, NWB  Chart, physical therapy assessment, plan of care and goals were reviewed. ASSESSMENT  Patient continues with skilled PT services and is progressing towards goals. Pt agreeable to therapy. Pt is aware of right NWB but unable to maintain. Attempted a squat pivot transfer but weightbearring during task. Pt will need lateral transfers to have improved compliance. Pt has good family support that will assist pt at discharge. Pt will need wheelchair for main mobility. Current Level of Function Impacting Discharge (mobility/balance): max A     Other factors to consider for discharge: right NWB, good family support         PLAN :  Patient continues to benefit from skilled intervention to address the above impairments. Continue treatment per established plan of care. to address goals.     Recommendation for discharge: (in order for the patient to meet his/her long term goals)  Physical therapy at least 2 days/week in the home AND ensure assist and/or supervision for safety with mobility     This discharge recommendation:  Has not yet been discussed the attending provider and/or case management    IF patient discharges home will need the following DME: wheelchair and drop arm commode        SUBJECTIVE:   Patient stated “I want to do anything you want me to.”    OBJECTIVE DATA SUMMARY:   Critical Behavior:  Neurologic State: Alert, Appropriate for age  Orientation Level: Appropriate for age  Cognition: Follows commands  Safety/Judgement: Awareness of environment, Decreased insight into deficits, Decreased awareness of need for safety, Fall prevention  Functional Mobility Training:  Bed Mobility:     Supine to Sit: Moderate assistance     Scooting: Moderate assistance        Transfers:              Bed to Chair: Maximum assistance(squat pviot to right)                    Balance:  Sitting: Intact;With support  Standing: Impaired  Standing - Static: Poor  Standing - Dynamic : Poor  Ambulation/Gait Training:                                                       Stairs:              Therapeutic Exercises:   Sitting ankle pumps, LAQ left hip flexion  Pain Rating:  Right LE    Activity Tolerance:   Poor    After treatment patient left in no apparent distress:   Sitting in chair, Call bell within reach, and Caregiver / family present    COMMUNICATION/COLLABORATION:   The patient’s plan of care was discussed with: Registered nurse.     Emerita Negrete, BRANDI   Time Calculation: 26 mins

## 2021-02-15 LAB
ANION GAP SERPL CALC-SCNC: 5 MMOL/L (ref 5–15)
BASOPHILS # BLD: 0 K/UL (ref 0–0.1)
BASOPHILS NFR BLD: 1 % (ref 0–1)
BUN SERPL-MCNC: 15 MG/DL (ref 6–20)
BUN/CREAT SERPL: 28 (ref 12–20)
CALCIUM SERPL-MCNC: 7.8 MG/DL (ref 8.5–10.1)
CHLORIDE SERPL-SCNC: 105 MMOL/L (ref 97–108)
CO2 SERPL-SCNC: 27 MMOL/L (ref 21–32)
CREAT SERPL-MCNC: 0.53 MG/DL (ref 0.55–1.02)
DIFFERENTIAL METHOD BLD: ABNORMAL
EOSINOPHIL # BLD: 0.1 K/UL (ref 0–0.4)
EOSINOPHIL NFR BLD: 3 % (ref 0–7)
ERYTHROCYTE [DISTWIDTH] IN BLOOD BY AUTOMATED COUNT: 15.9 % (ref 11.5–14.5)
GLUCOSE SERPL-MCNC: 93 MG/DL (ref 65–100)
HCT VFR BLD AUTO: 25.2 % (ref 35–47)
HGB BLD-MCNC: 8.1 G/DL (ref 11.5–16)
IMM GRANULOCYTES # BLD AUTO: 0.1 K/UL (ref 0–0.04)
IMM GRANULOCYTES NFR BLD AUTO: 2 % (ref 0–0.5)
LYMPHOCYTES # BLD: 0.5 K/UL (ref 0.8–3.5)
LYMPHOCYTES NFR BLD: 10 % (ref 12–49)
MCH RBC QN AUTO: 28.9 PG (ref 26–34)
MCHC RBC AUTO-ENTMCNC: 32.1 G/DL (ref 30–36.5)
MCV RBC AUTO: 90 FL (ref 80–99)
MONOCYTES # BLD: 0.8 K/UL (ref 0–1)
MONOCYTES NFR BLD: 18 % (ref 5–13)
NEUTS SEG # BLD: 3.1 K/UL (ref 1.8–8)
NEUTS SEG NFR BLD: 66 % (ref 32–75)
NRBC # BLD: 0 K/UL (ref 0–0.01)
NRBC BLD-RTO: 0 PER 100 WBC
PLATELET # BLD AUTO: 416 K/UL (ref 150–400)
PMV BLD AUTO: 9.3 FL (ref 8.9–12.9)
POTASSIUM SERPL-SCNC: 3.6 MMOL/L (ref 3.5–5.1)
RBC # BLD AUTO: 2.8 M/UL (ref 3.8–5.2)
RBC MORPH BLD: ABNORMAL
SODIUM SERPL-SCNC: 137 MMOL/L (ref 136–145)
WBC # BLD AUTO: 4.6 K/UL (ref 3.6–11)

## 2021-02-15 PROCEDURE — 74011250637 HC RX REV CODE- 250/637: Performed by: INTERNAL MEDICINE

## 2021-02-15 PROCEDURE — 97535 SELF CARE MNGMENT TRAINING: CPT

## 2021-02-15 PROCEDURE — 80048 BASIC METABOLIC PNL TOTAL CA: CPT

## 2021-02-15 PROCEDURE — 65270000029 HC RM PRIVATE

## 2021-02-15 PROCEDURE — 85025 COMPLETE CBC W/AUTO DIFF WBC: CPT

## 2021-02-15 PROCEDURE — 36415 COLL VENOUS BLD VENIPUNCTURE: CPT

## 2021-02-15 PROCEDURE — 97530 THERAPEUTIC ACTIVITIES: CPT

## 2021-02-15 PROCEDURE — 97164 PT RE-EVAL EST PLAN CARE: CPT

## 2021-02-15 PROCEDURE — 74011250637 HC RX REV CODE- 250/637: Performed by: STUDENT IN AN ORGANIZED HEALTH CARE EDUCATION/TRAINING PROGRAM

## 2021-02-15 PROCEDURE — 74011250636 HC RX REV CODE- 250/636: Performed by: STUDENT IN AN ORGANIZED HEALTH CARE EDUCATION/TRAINING PROGRAM

## 2021-02-15 RX ADMIN — Medication 10 ML: at 00:00

## 2021-02-15 RX ADMIN — DOCUSATE SODIUM 50 MG AND SENNOSIDES 8.6 MG 1 TABLET: 8.6; 5 TABLET, FILM COATED ORAL at 18:12

## 2021-02-15 RX ADMIN — DOCUSATE SODIUM 50 MG AND SENNOSIDES 8.6 MG 1 TABLET: 8.6; 5 TABLET, FILM COATED ORAL at 10:48

## 2021-02-15 RX ADMIN — Medication 10 ML: at 06:11

## 2021-02-15 RX ADMIN — FAMOTIDINE 20 MG: 20 TABLET ORAL at 21:10

## 2021-02-15 RX ADMIN — ACETAMINOPHEN 650 MG: 325 TABLET ORAL at 18:12

## 2021-02-15 RX ADMIN — ACETAMINOPHEN 650 MG: 325 TABLET ORAL at 10:48

## 2021-02-15 RX ADMIN — ENOXAPARIN SODIUM 30 MG: 30 INJECTION SUBCUTANEOUS at 18:14

## 2021-02-15 RX ADMIN — Medication 10 ML: at 13:52

## 2021-02-15 RX ADMIN — ENOXAPARIN SODIUM 30 MG: 30 INJECTION SUBCUTANEOUS at 06:08

## 2021-02-15 NOTE — PROGRESS NOTES
Problem: Self Care Deficits Care Plan (Adult)  Goal: *Acute Goals and Plan of Care (Insert Text)  Description: FUNCTIONAL STATUS PRIOR TO ADMISSION: Patient was ambulatory with rollator walker at baseline. Pt was mod I to supervision level for ADLs with assistance required for bathing. Has history of falls. Had ORIF Nov 2020 and is receiving HH therapy: had just recently progressed to a few hours a day alone. Using [employer] SUPPORT PRIOR TO ADMISSION: The patient lived alone with family across the street to provide assistance. All family has job with somewhat flexible hours but patient will need 24/7 care at discharge; Daughter Jonathan Wilson  aware that 24/7 is needed. Occupational Therapy Goals  Initiated 2/8/2021; Weekly Re-Assessment 2/15/2021 - Continue all. 1.  Patient will perform grooming, seated EOB unsupported, with supervision/set-up within 7 day(s). 2.  Patient will perform upper body dressing with supervision/set-up within 7 day(s). 3.  Patient will perform anterior bathing from neck to thighs, seated EOB, with minimal assistance within 7 day(s). 4.  Patient will perform toilet transfers to [facility] with maximal assistance within 7 day(s). 5.  Patient will participate in upper extremity therapeutic exercise/activities with minimal assistance/contact guard assist for 5 minutes within 7 day(s). Outcome: Progressing Towards Goal   OCCUPATIONAL THERAPY TREATMENT/WEEKLY RE-ASSESSMENT  Patient: Robert Colon (7 y.o. female)  Date: 2/15/2021  Diagnosis: Hip fracture ([facility] [S72.009A] Hip fracture ([facility] [address]  Procedure(s) (LRB):  RIGHT HIP CONVERSION TO ARTHROPLASTY TOTAL ANTERIOR APPROACH (Right) 9 Days Post-Op  Precautions: Fall, NWB  Chart, occupational therapy assessment, plan of care, and goals were reviewed. ASSESSMENT  Patient continues with skilled OT services and is progressing towards goals.  Patient received in bed with daughter in room - demonstrated good understanding of leg  to achieve EOB transfer. This session, emphasis on functional transfers, attempting lateral transfers to drop-arm recliner chair and BSC with use of sliding board. Patient with decreased processing during instruction on transfer techniques and weight-shifting to facilitate seated lower body dressing at [facility], with max multimodal cues and repetition required. Note patient easily overwhelmed, discouraged by current limitations and at times tearful. Frequent rest breaks required for calming and re-focusing on session goals. Per patient and family, plan is to discharge home with family support. Patient will require heavy assistance of multiple persons for any OOB mobility/activity as well as [facility] services to maximize gains in patient safety and independence. While in acute setting, goals remain appropriate for next reporting period. Current Level of Function Impacting Discharge (ADLs): up to total A    Other factors to consider for discharge: unable to maintain NWB RLE         PLAN :  Goals have been updated based on progression since last assessment. Patient continues to benefit from skilled intervention to address the above impairments. Continue to follow patient 5 times a week to address goals. Recommend with staff: Chair position in bed for meals    Recommend next OT session: BSC transfer; LB ADLs    Recommendation for discharge: (in order for the patient to meet his/her long term goals)  HH with 24/7 hands-on supervision and assist for all activity and mobility; If needed level of assist/supervision unavailable, would require rehab. This discharge recommendation:  Has been made in collaboration with the attending provider and/or case management    IF patient discharges home will need the following DME: TBD       SUBJECTIVE:   Patient stated i've never been such a baby since I was a baby.  Patient very discouraged and frustrated by current limitations given she had been independent previously. OBJECTIVE DATA SUMMARY:   Cognitive/Behavioral Status:  Neurologic State: Alert  Orientation Level: Oriented to person;Oriented to situation;Oriented to place  Cognition: Follows commands;Decreased attention/concentration  Perception: Appears intact  Perseveration: No perseveration noted  Safety/Judgement: Fall prevention; Awareness of environment    Functional Mobility and Transfers for ADLs:  Bed Mobility:  Supine to Sit: Stand-by assistance; Additional time(leg )    Transfers:  Functional Transfers  Toilet Transfer : Maximum assistance;Assist x2(via lateral transfer to drop-arm BSC)  Bed to Chair: Maximum assistance;Assist x2(lateral transfer via sliding board)    Balance:   Sitting Balance: Good    ADL Intervention:  Feeding  Feeding Assistance: Set-up  Drink to Mouth: Set-up    Lower Body Dressing Assistance  Dressing Assistance: Total assistance(dependent)  Protective Undergarmet: Total assistance (dependent)    Toileting  Clothing Management: Total assistance (dependent)  Cues: Verbal cues provided;Visual cues provided; Tactile cues provided;Physical assistance for pants down  Adaptive Equipment: Grab bars(drop-arm BSC)    Cognitive Retraining  Problem Solving: Identifying the problem  Executive Functions: Executing cognitive plans  Safety/Judgement: Fall prevention; Awareness of environment    Therapeutic Exercises:   Encouraged chair push-ups for upper body strengthening to facilitate lateral transfers. Pain:  Patient with pain at operative site. Activity Tolerance:   Fair and requires frequent rest breaks    After treatment patient left in no apparent distress:   Sitting in chair, Call bell within reach, and Caregiver / family present    COMMUNICATION/COLLABORATION:   The patients plan of care was discussed with: Physical therapist and Registered nurse.      Genna Nunez OT  Time Calculation: 59 mins

## 2021-02-15 NOTE — PROGRESS NOTES
Problem: Mobility Impaired (Adult and Pediatric)  Goal: *Acute Goals and Plan of Care (Insert Text)  Description: FUNCTIONAL STATUS PRIOR TO ADMISSION: Patient was ambulatory with rollator walker at baseline. Has history of falls. HOME SUPPORT PRIOR TO ADMISSION: The patient lived alone with family across the street to provide assistance. Physical Therapy Goals  Reassessed 2/15/2021  1. Patient will move from supine to sit and sit to supine  in bed with maximal assistance within 7 day(s). (GOAL MET with head of bed elevated/use of bed rail, cues, use of strap). 2.  Patient will transfer from bed to chair and chair to bed with maximal assistance x 2 using the least restrictive device within 7 day(s). (GOAL MET & upgraded to moderate assistance of 2). 3.  Patient will perform sit to stand with maximal assistance x 2 within 7 day(s). (Unable to stand while maintaining [address] status). 4.  Patient will complete HEP with modA within 7 days. (Improving toward goal). Patient has met 2 goals, which have been upgraded, is improving toward one goal, and has not made progress toward 4th goal. Continue to address as written above. Physical Therapy Goals  Initiated 2/8/2021  1. Patient will move from supine to sit and sit to supine  in bed with maximal assistance within 7 day(s). 2.  Patient will transfer from bed to chair and chair to bed with maximal assistance x 2 using the least restrictive device within 7 day(s). 3.  Patient will perform sit to stand with maximal assistance x 2 within 7 day(s). 4.  Patient will complete HEP with modA within 7 days.        Outcome: Progressing Towards Goal   PHYSICAL THERAPY REEVALUATION  Patient: Mando Naranjo (89 y.o. female)  Date: 2/15/2021  Primary Diagnosis: Hip fracture ([facility] [address] [S72.009A]  Procedure(s) (LRB):  RIGHT HIP CONVERSION TO ARTHROPLASTY TOTAL ANTERIOR APPROACH (Right) 9 Days Post-Op   Precautions: Fall, NWB      ASSESSMENT  Based on the objective data described below, the patient presents as follows: Patient has met 2 goals, which have been upgraded, is improving toward one goal, and has not made progress toward 4th goal. Continue to address as written above. Family wants to bring patient home with Home Health and 24/7 family assistance, but transfers need to improve in order for that to occur. Patient seems discouraged today, frequently putting her head into her hands and disparaging her lack of \"ability\". Tried to give patient much encouragement and positive reinforcement. Current Level of Function Impacting Discharge (mobility/balance): Maximal assistance of 2 for transfers (lateral transfer, use of sliding board and drop arm commode and chair, gait belt). Stand by assistance, additional time, use of strap, bed rail, head of bed elevated and cues for supine-sidelying-sit on edge of bed. Functional Outcome Measure: The patient scored 35/100 on the Barthel outcome measure which is indicative of maximal impaired ability to care for basic self-needs/dependency on others. Other factors to consider for discharge: [address] R and unable to stand and maintain NWB'ing status/Motivated/A & O x 4/Supportive Family/Far from Modified independent PLOF      Patient will benefit from skilled therapy intervention to address the above noted impairments. PLAN :  Recommendations and Planned Interventions: bed mobility training, transfer training, therapeutic exercises, patient and family training/education, and therapeutic activities      Frequency/Duration: Patient will be followed by physical therapy:  daily to address goals. Recommendation for discharge: (in order for the patient to meet his/her long term goals)  Physical therapy at least 2 days/week in the home AND ensure assist and/or supervision for safety with all mobility and ADLs. Patient adamantly does NOT want patient to go to SNF.     This discharge recommendation:  Has been made in collaboration with the attending provider and/or case management    Equipment recommendations for successful discharge (if) home: bedside commode, hospital bed, and wheelchair (Family is obtaining hospital bed and drop arm bed side commode on their own. Case Management is ordering wheelchair). SUBJECTIVE:   Patient stated I just can't stand having people do things for me. I am usually so independent.     OBJECTIVE DATA SUMMARY:   HISTORY:    Past Medical History:   Diagnosis Date    Atrial fibrillation ([facility] [address]     Cancer ([facility] [address]     radiation and lumpectony    GERD (gastroesophageal reflux disease)     Hearing impaired     Mild dementia ([facility] [address] 2/6/2021    Paroxysmal atrial fibrillation ([facility] [address]     a.fib 11/2010,    NS 02/2021     Past Surgical History:   Procedure Laterality Date    HX ORTHOPAEDIC      right hip    IR KYPHOPLASTY LUMBAR  8/19/2019    [facility] course since last seen and reason for reevaluation: Patient has met 2 goals, which have been upgraded, is improving toward one goal, and has not made progress toward 4th goal. Continue to address as written above. Family wants to bring patient home with Home Health and 24/7 family assistance, but transfers need to improve in order for that to occur. Patient seems discouraged today, frequently putting her head into her hands and disparaging her lack of \"ability\".      Personal factors and/or comorbidities impacting plan of care: [address] R and unable to stand and maintain NWB'ing status/Motivated/A & O x 4/Supportive Family/Far from Modified independent PLOF     Home Situation  Home Environment: Private residence  # Steps to Enter: 2  One/Two Story Residence: One story  Living Alone: Yes(family lives across the street)  Support Systems: Family member(s)  Patient Expects to be Discharged to[de-identified] Private residence  Current DME Used/Available at Home: Shower chair, Walker, rollator    EXAMINATION/PRESENTATION/DECISION MAKING:   Critical Behavior:  Neurologic State: Alert  Orientation Level: Oriented to person, Oriented to situation, Oriented to place  Cognition: Follows commands, Decreased attention/concentration  Safety/Judgement: Fall prevention, Awareness of environment  Range Of Motion:  AROM: Generally decreased, functional           PROM: Generally decreased, functional           Strength:    Strength: Generally decreased, functional                    Tone & Sensation:   Tone: Normal              Sensation: Intact               Coordination:  Coordination: Within functional limits  Vision:      Functional Mobility:  Bed Mobility:     Supine to Sit: Stand-by assistance; Adaptive equipment; Additional time;Bed Modified(cues/use of strap/head of bed elevated/use of rail)  Sit to Supine: Other (comment)(left up in recliner)     Transfers:              Bed to Chair: Maximum assistance;Assist x2(use of sliding board & gait belt)  Lateral Transfers: (see above)     Sliding Board : (see above)     Balance:   Sitting: Intact  Standing: (Unable)  Ambulation/Gait Training:                    Right Side Weight Bearing: Non-weight bearing                    Therapeutic Exercises: Ankle Pumps  Ham Sets  Quad Sets  Glute Sets  Assisted Heel Slides  X 10 each every hour     Functional Measure:  Barthel Index:    Bathin  Bladder: 5  Bowels: 5  Groomin  Dressin  Feeding: 10  Mobility: 0  Stairs: 0  Toilet Use: 0  Transfer (Bed to Chair and Back): 5  Total: 35/100       The Barthel ADL Index: Guidelines  1. The index should be used as a record of what a patient does, not as a record of what a patient could do. 2. The main aim is to establish degree of independence from any help, physical or verbal, however minor and for whatever reason. 3. The need for supervision renders the patient not independent. 4. A patient's performance should be established using the best available evidence.  Asking the patient, friends/relatives and nurses are the usual sources, but direct observation and common sense are also important. However direct testing is not needed. 5. Usually the patient's performance over the preceding 24-48 hours is important, but occasionally longer periods will be relevant. 6. Middle categories imply that the patient supplies over 50 per cent of the effort. 7. Use of aids to be independent is allowed. Linzy Dom., Barthel, D.W. (8992). Functional evaluation: the Barthel Index. [facility] (14)2. Jeffrey William sridevi IVAN Wilder, Memo Badillo., Deborah Collins., [place], [address] (). Measuring the change indisability after inpatient rehabilitation; comparison of the responsiveness of the Barthel Index and Functional [place] Measure. Journal of Neurology, Neurosurgery, and Psychiatry, 66(4), 499-287. GLENYS Del Castillo, KRYSTLE Karimi, & Larry Garsia M.A. (2004.) Assessment of post-stroke quality of life in cost-effectiveness studies: The usefulness of the Barthel Index and the EuroQoL-5D. Quality of Life Research, 13, 427-43             Pain Ratin/10 right hip    Activity Tolerance:   Fair    After treatment patient left in no apparent distress:   Sitting in chair, Call bell within reach, Caregiver / family present, and nurse notified. COMMUNICATION/EDUCATION:   The patients plan of care was discussed with: Occupational therapist and Registered nurse. Fall prevention education was provided and the patient/caregiver indicated understanding., Patient/family have participated as able in goal setting and plan of care. , and Patient/family agree to work toward stated goals and plan of care.     Thank you for this referral.  Yvonne Nelsonr   Time Calculation: 52 mins

## 2021-02-15 NOTE — PROGRESS NOTES
Bedside shift change report given to Shaji Palacio RN (oncoming nurse) by Sadi Alvarado RN (offgoing nurse). Report included the following information SBAR, Kardex, Intake/Output, MAR and Recent Results.

## 2021-02-15 NOTE — ROUTINE PROCESS
LETTER OF MEDICAL NECESSITY 2/15/2021 To Whom It May Concern:  
Ardia Bernheim is currently a patient at [facility]. She was admitted on 2/5/2021 due to Hip fracture ([facility] [address] [S72.009A], Procedure(s) (LRB): 
RIGHT HIP CONVERSION TO ARTHROPLASTY TOTAL ANTERIOR APPROACH (Right) 9 Days Post-Op and with Fall, NWB precautions. Due to medical diagnosis and additional complications weakness, NWB'ing status, has not progressed to a functional level where the patient can safely ambulate using a SPC, rolling walker, crutches, or standard walker. Therefore, she is a HIGH fall risk & needs a wheelchair to help with daily ADLs. -Platform drop arm bedside commode (to enable lateral transfer with minimal risk to her skin) -Ambulance transport home (to manage the 2 steps to enter the home safely) -Wheelchair with the following specifications: 
    Lightweight (due to advanced age and generalized weakness) 16\" wide by 18\" deep Standard height Flip back desk arms Swing away leg rests Pull to lock brakes with extensions (needs extensions in order to be able to lock brakes independently, due to UE weakness ). Seatlbelt Standard foam cushion with solid seat insert A seat belt and anti-tippers are needed for fall prevention within the home, on access ramp into home, community outings, and appointments. Thank you from the treating therapist and physician, Lubna Perkins/PT

## 2021-02-15 NOTE — PROGRESS NOTES
Progress Note    Patient: Kristel Tan MRN: [id number]  SSN: [id number]    YOB: 1928  Age: 80 y.o. Sex: female      Admit Date: 2/5/2021    LOS: 10 days     Subjective:     No events overnight. Did well over the weekend. Pain controlled. Did c/o swelling to the RLE noticed yesterday. DVT US negative. Patient tolerating PO well. Denies fevers, chills. Awaiting H/H equipement. Objective:     Vitals:    02/14/21 1500 02/14/21 2200 02/15/21 0251 02/15/21 0344   BP: 114/63 121/61  (!) 158/72   Pulse: 86 83  81   Resp: 18 16  16   Temp: 97.8 °F (36.6 °C) 98.4 °F (36.9 °C)  98 °F (36.7 °C)   SpO2: 94% 96%  95%   Weight:   48.7 kg (107 lb 6.4 oz)    Height:            Intake and Output:  Current Shift: No intake/output data recorded. Last three shifts: 02/13 1901 - 02/15 0700  In: -   Out: 1500 [Urine:1500]    Physical Exam:   Gen: Alert to name, can follow basic commands  RLE: +EHL/FHL, can fire quads but difficulty with straight leg raise 2/2 pain. Toes WWP. Surgical dressing c/d/i    Lab/Data Review:  DVGT  2/14: Neg    Assessment:   79 yo female s/p conversion R ROMAIN (DOS 2/6/21)     Plan:     -NWB RLE (ok for foot flat/foot on ground with a walker, no direct transfers on RLE)  -IV ancef completed  -PT/OT eval/treat: NWB RLE  -CM for dispo planning (family wishes to go home w/ HH)  -Pain control per primary  -Monitor R LFCN pain (no issues past 24 hrs, will monitor)  -IS 10x/hr/day when awake  -Will order wound care consult to eval R anterior hip wound, rec possible pannus bolster to prevent wound breakdown.        Signed By: Ena Gorman MD     February 15, 2021

## 2021-02-15 NOTE — PROGRESS NOTES
EDYTA: Plan for discharge home with [address] has accepted patient for [facility]. Order for wheelchair and platform drop arm bedside commode pending with [employer].  Family is working on coordinating private duty care with [address] [place] [address] and renting a hospital bed for home. BLS transport at discharge.      CM recieved notification that [employer] medical is requesting additional documentation for the wheelchair. CM discussed with PT and sent updates to [facility] via [employer].     LISANDRO BuckleyW/CRM

## 2021-02-15 NOTE — WOUND CARE
Wound Care Note:  
 
New consult placed by physician request for post op R anterior hip wound. Eval post op wound, possible bolster for pannus Chart shows: 
Admitted for right hip fracture s/p right total hip conversion arthroplasty 2/6/21 Past Medical History:  
Diagnosis Date  Atrial fibrillation ([facility] [address]  Cancer [facility])   
 radiation and lumpectony  GERD (gastroesophageal reflux disease)  Hearing impaired  Mild dementia ([facility] [address] 2/6/2021  Paroxysmal atrial fibrillation ([facility] [address]   
 a.fib 11/2010,    NS 02/2021 WBC = 4.6 on 2/15/21 Admitted from home Assessment:  
Patient is alert and talking, incontinent with moderate assistance needed in repositioning. Bed: [facility] Patient wearing briefs for incontinence and has a Pure Fredick Casey in place. Diet: Regular Patient reports no pain. Bilateral heels, buttocks, and sacral skin intact and without erythema. 1. POA right hip with anterior and lateral incisions, anterior being the longest, sutures in place, incision well approximated, no drainage, hitesh-wound intact without erythema. ABD pad applied and taped to secure. Optifoam Gentle placed just above incision and raised to gather pannus and lift away from incision. Seems to be holding well. Patient repositioned on left side. Heels offloaded on pillows. Recommendations:   
Right hip incisions- Daily change dry dressing. Use Optifoam Gentle just above incision, lift up gathering pannus and secure to keep pannus off incision. Skin Care & Pressure Prevention: 
Minimize layers of linen/pads under patient to optimize support surface. Turn/reposition approximately every 2 hours and offload heels. Manage incontinence / promote continence Nourishing Skin Cream to dry skin, minimize use of briefs when able Discussed above plan with patient Lawyer Esau RN 
 
 Transition of Care: Plan to follow as needed while admitted to hospital. 
 
DOUGLAS Teague, RN, [facility], [facility]. 
office [phone] 
pager [phone] or call  to page

## 2021-02-15 NOTE — PROGRESS NOTES
Miko Caruso, Joshua Castro, and Mai Joe Date: 2/5/2021      Subjective:     Patient continues to do better with her transfers. Family is working on getting aid from [employer] to help at home. Also waiting for hospital bed to be delivered- hopefully today. .       Current Facility-Administered Medications   Medication Dose Route Frequency    0.9% sodium chloride infusion 250 mL  250 mL IntraVENous PRN    famotidine (PEPCID) tablet 20 mg  20 mg Oral QHS    acetaminophen (TYLENOL) tablet 650 mg  650 mg Oral Q6H PRN    sodium chloride (NS) flush 5-40 mL  5-40 mL IntraVENous PRN    sodium chloride (NS) flush 5-40 mL  5-40 mL IntraVENous Q8H    naloxone (NARCAN) injection 0.4 mg  0.4 mg IntraVENous PRN    calcium-vitamin D (OS-CARLOS +D3) 500 mg-200 unit per tablet 1 Tab  1 Tab Oral TID WITH MEALS    senna-docusate (PERICOLACE) 8.6-50 mg per tablet 1 Tab  1 Tab Oral BID    polyethylene glycol (MIRALAX) packet 17 g  17 g Oral DAILY    bisacodyL (DULCOLAX) suppository 10 mg  10 mg Rectal DAILY PRN    oxyCODONE IR (ROXICODONE) tablet 2.5 mg  2.5 mg Oral Q4H PRN    enoxaparin (LOVENOX) injection 30 mg  30 mg SubCUTAneous Q12H    acetaminophen (TYLENOL) tablet 650 mg  650 mg Oral Q6H    lidocaine 4 % patch 1 Patch  1 Patch TransDERmal Q24H    traMADoL (ULTRAM) tablet 50 mg  50 mg Oral Q6H PRN          Objective:     Patient Vitals for the past 8 hrs:   BP Temp Pulse Resp SpO2 Weight   02/15/21 0344 (!) 158/72 98 °F (36.7 °C) 81 16 95 %    02/15/21 0251      107 lb 6.4 oz (48.7 kg)     No intake/output data recorded. 02/13 1901 - 02/15 0700  In: -   Out: 1500 [Urine:1500]    Physical Exam: Lungs: clear to auscultation bilaterally  Heart: regular rate and rhythm, S1, S2 normal, no murmur, click, rub or gallop  Abdomen: soft, non-tender.  Bowel sounds normal. No masses,  no organomegaly        Data Review   Recent Results (from the past 24 hour(s))   METABOLIC PANEL, BASIC Collection Time: 02/15/21  6:00 AM   Result Value Ref Range    Sodium 137 136 - 145 mmol/L    Potassium 3.6 3.5 - 5.1 mmol/L    Chloride 105 97 - 108 mmol/L    CO2 27 21 - 32 mmol/L    Anion gap 5 5 - 15 mmol/L    Glucose 93 65 - 100 mg/dL    BUN 15 6 - 20 MG/DL    Creatinine 0.53 (L) 0.55 - 1.02 MG/DL    BUN/Creatinine ratio 28 (H) 12 - 20      GFR est AA >60 >60 ml/min/1.73m2    GFR est non-AA >60 >60 ml/min/1.73m2    Calcium 7.8 (L) 8.5 - 10.1 MG/DL   CBC WITH AUTOMATED DIFF    Collection Time: 02/15/21  6:00 AM   Result Value Ref Range    WBC 4.6 3.6 - 11.0 K/uL    RBC 2.80 (L) 3.80 - 5.20 M/uL    HGB 8.1 (L) 11.5 - 16.0 g/dL    HCT 25.2 (L) 35.0 - 47.0 %    MCV 90.0 80.0 - 99.0 FL    MCH 28.9 26.0 - 34.0 PG    MCHC 32.1 30.0 - 36.5 g/dL    RDW 15.9 (H) 11.5 - 14.5 %    PLATELET 417 (H) 079 - 400 K/uL    MPV 9.3 8.9 - 12.9 FL    NRBC 0.0 0  WBC    ABSOLUTE NRBC 0.00 0.00 - 0.01 K/uL    NEUTROPHILS 66 32 - 75 %    LYMPHOCYTES 10 (L) 12 - 49 %    MONOCYTES 18 (H) 5 - 13 %    EOSINOPHILS 3 0 - 7 %    BASOPHILS 1 0 - 1 %    IMMATURE GRANULOCYTES 2 (H) 0.0 - 0.5 %    ABS. NEUTROPHILS 3.1 1.8 - 8.0 K/UL    ABS. LYMPHOCYTES 0.5 (L) 0.8 - 3.5 K/UL    ABS. MONOCYTES 0.8 0.0 - 1.0 K/UL    ABS. EOSINOPHILS 0.1 0.0 - 0.4 K/UL    ABS. BASOPHILS 0.0 0.0 - 0.1 K/UL    ABS. IMM.  GRANS. 0.1 (H) 0.00 - 0.04 K/UL    DF SMEAR SCANNED      RBC COMMENTS ANISOCYTOSIS  1+               Assessment:     Principal Problem:    Hip fracture (HCC) (11/14/2020)    Active Problems:    GERD (gastroesophageal reflux disease) ()      Mild dementia ([address] (2/6/2021)        Plan:     1) Cont to work with PT  2) Up in chair as much as possible  3) Hopefully able to d/c in AM if bed and help are in place

## 2021-02-16 PROCEDURE — 97530 THERAPEUTIC ACTIVITIES: CPT

## 2021-02-16 PROCEDURE — 65270000029 HC RM PRIVATE

## 2021-02-16 PROCEDURE — 74011250637 HC RX REV CODE- 250/637: Performed by: INTERNAL MEDICINE

## 2021-02-16 PROCEDURE — 74011250637 HC RX REV CODE- 250/637: Performed by: STUDENT IN AN ORGANIZED HEALTH CARE EDUCATION/TRAINING PROGRAM

## 2021-02-16 PROCEDURE — 97535 SELF CARE MNGMENT TRAINING: CPT

## 2021-02-16 PROCEDURE — 74011250636 HC RX REV CODE- 250/636: Performed by: STUDENT IN AN ORGANIZED HEALTH CARE EDUCATION/TRAINING PROGRAM

## 2021-02-16 PROCEDURE — 77030038269 HC DRN EXT URIN PURWCK BARD -A

## 2021-02-16 RX ADMIN — ACETAMINOPHEN 650 MG: 325 TABLET ORAL at 17:06

## 2021-02-16 RX ADMIN — BISACODYL 10 MG: 10 SUPPOSITORY RECTAL at 08:12

## 2021-02-16 RX ADMIN — Medication 1 TABLET: at 08:49

## 2021-02-16 RX ADMIN — DOCUSATE SODIUM 50 MG AND SENNOSIDES 8.6 MG 1 TABLET: 8.6; 5 TABLET, FILM COATED ORAL at 08:49

## 2021-02-16 RX ADMIN — Medication 10 ML: at 17:06

## 2021-02-16 RX ADMIN — Medication 10 ML: at 06:00

## 2021-02-16 RX ADMIN — ACETAMINOPHEN 650 MG: 325 TABLET ORAL at 08:49

## 2021-02-16 RX ADMIN — Medication 1 TABLET: at 17:06

## 2021-02-16 RX ADMIN — ACETAMINOPHEN 650 MG: 325 TABLET ORAL at 06:31

## 2021-02-16 RX ADMIN — Medication 1 TABLET: at 11:56

## 2021-02-16 RX ADMIN — ENOXAPARIN SODIUM 30 MG: 30 INJECTION SUBCUTANEOUS at 06:31

## 2021-02-16 RX ADMIN — FAMOTIDINE 20 MG: 20 TABLET ORAL at 21:00

## 2021-02-16 RX ADMIN — DOCUSATE SODIUM 50 MG AND SENNOSIDES 8.6 MG 1 TABLET: 8.6; 5 TABLET, FILM COATED ORAL at 17:06

## 2021-02-16 RX ADMIN — ENOXAPARIN SODIUM 30 MG: 30 INJECTION SUBCUTANEOUS at 18:01

## 2021-02-16 NOTE — PROGRESS NOTES
Bedside and Verbal shift change report given to Mega Childers (oncoming nurse) by Tony Navarro (offgoing nurse). Report included the following information SBAR, Kardex, Procedure Summary, Intake/Output and MAR.

## 2021-02-16 NOTE — PROGRESS NOTES
Bedside shift change report given to Emerita  (oncoming nurse) by Yuri Rowe  (offgoing nurse). Report included the following information SBAR, Kardex, Intake/Output and MAR.

## 2021-02-16 NOTE — PROGRESS NOTES
Progress Note    Patient: Margoth Mojica MRN: [id number]  SSN: [id number]    YOB: 1928  Age: 80 y.o. Sex: female      Admit Date: 2/5/2021    LOS: 11 days     Subjective:     No events overnight. Discussed that hospital bed will likely be delivered tomorrow. Likely d/c at that time. Patient continues to work with PT. Pain overall improving. Wound care evaluation yesterday. Objective:     Vitals:    02/15/21 1639 02/15/21 2046 02/16/21 0629 02/16/21 0917   BP: 111/61 (!) 146/72 136/75 (!) 104/50   Pulse: 89 84 77 97   Resp: 16 16 16 16   Temp: 98.3 °F (36.8 °C) 97.9 °F (36.6 °C) 98 °F (36.7 °C) 98.2 °F (36.8 °C)   SpO2: 98% 96% 94% 95%   Weight:       Height:            Intake and Output:  Current Shift: No intake/output data recorded. Last three shifts: 02/14 1901 - 02/16 0700  In: -   Out: 1800 [Urine:1800]    Physical Exam:   Gen: Alert to name, can follow basic commands  RLE: +EHL/FHL, can fire quads but difficulty with straight leg raise 2/2 pain. Toes WWP.  Surgical dressing c/d/i    [facility]  [facility]    Assessment:   79 yo female s/p conversion R ROMAIN (DOS 2/6/21)     Plan:     -NWB RLE (ok for foot flat/foot on ground with a walker, no direct transfers on RLE)  -IV ancef completed  -PT/OT eval/treat: NWB RLE  -CM for dispo planning (family wishes to go home w/ HH)  -Pain control per primary  -Monitor R LFCN pain (no issues the past 2 days, will monitor)  -IS 10x/hr/day when awake  -Wound care eval: Appreciate recs     Signed By: Luis Alberto Durant MD     February 16, 2021

## 2021-02-16 NOTE — PROGRESS NOTES
Problem: Self Care Deficits Care Plan (Adult)  Goal: *Acute Goals and Plan of Care (Insert Text)  Description: FUNCTIONAL STATUS PRIOR TO ADMISSION: Patient was ambulatory with rollator walker at baseline. Pt was mod I to supervision level for ADLs with assistance required for bathing. Has history of falls. Had ORIF Nov 2020 and is receiving HH therapy: had just recently progressed to a few hours a day alone. Using enosiX SUPPORT PRIOR TO ADMISSION: The patient lived alone with family across the street to provide assistance. All family has job with somewhat flexible hours but patient will need 24/7 care at discharge; Daughter Ilda Hutchison  aware that 24/7 is needed. Occupational Therapy Goals  Initiated 2/8/2021; Weekly Re-Assessment 2/15/2021 - Continue all. 1.  Patient will perform grooming, seated EOB unsupported, with supervision/set-up within 7 day(s). 2.  Patient will perform upper body dressing with supervision/set-up within 7 day(s). 3.  Patient will perform anterior bathing from neck to thighs, seated EOB, with minimal assistance within 7 day(s). 4.  Patient will perform toilet transfers to [facility] with maximal assistance within 7 day(s). 5.  Patient will participate in upper extremity therapeutic exercise/activities with minimal assistance/contact guard assist for 5 minutes within 7 day(s). Outcome: Progressing Towards Goal    OCCUPATIONAL THERAPY TREATMENT  Patient: Radha Navarro (33 y.o. female)  Date: 2/16/2021  Diagnosis: Hip fracture ([facility] [address] [S72.009A] Hip fracture ([facility] [address]  Procedure(s) (LRB):  RIGHT HIP CONVERSION TO ARTHROPLASTY TOTAL ANTERIOR APPROACH (Right) 10 Days Post-Op  Precautions: Fall, NWB  Chart, occupational therapy assessment, plan of care, and goals were reviewed. ASSESSMENT  Patient continues with skilled OT services and is progressing towards goals.   Pt progressing with functional mobility/balance as evidenced by successful engagement with toileting at [facility], implementing standing pivot turn transfer and good compliance with RLE NWB. Pt's daughter present and education on home safety, safe transfer techniques, with good understanding verbalized and extended time provided by therapist to answer questions. Pt continues to benefit from skilled OT to address functional deficits during acute hospitalization in an attempt at maximizing pt's highest level of safe functional independence prior to discharge, with reporting therapist believing pt would benefit from [address] [place] [address] services and ADL/IADL Supervision upon discharge. Current Level of Function Impacting Discharge (ADLs): Total A    Other factors to consider for discharge: Total A         PLAN :  Patient continues to benefit from skilled intervention to address the above impairments. Continue treatment per established plan of care. to address goals. Recommend next OT session: POC progression    Recommendation for discharge: (in order for the patient to meet his/her long term goals)  Occupational therapy at least 2 days/week in the home AND ensure assist and/or supervision for safety with ADLs/IADLs    This discharge recommendation:  Has been made in collaboration with the attending provider and/or case management    IF patient discharges home will need the following DME: CM already following DME needs       SUBJECTIVE:   Patient stated my leg feels hot.     OBJECTIVE DATA SUMMARY:   Cognitive/Behavioral Status:  Neurologic State: Alert  Orientation Level: Oriented X4  Cognition: Memory loss  Perception: Appears intact  Perseveration: No perseveration noted  Safety/Judgement: Awareness of environment; Fall prevention;Home safety    Functional Mobility and Transfers for ADLs:  Bed Mobility:  Rolling: Minimum assistance  Sit to Supine: Maximum assistance    Transfers:  Sit to Stand: Maximum assistance  Functional Transfers  Toilet Transfer : Maximum assistance  Bed to Chair: Maximum assistance    Balance:  Sitting: Intact  Standing: Impaired; With support  Standing - Static: Poor  Standing - Dynamic : Poor    ADL Intervention:  Toileting  Toileting Assistance: Total assistance(dependent)  Bladder Hygiene: Minimum assistance  Bowel Hygiene: Total assistance (dependent)  Clothing Management: Total assistance (dependent)  Cues: Physical assistance for pants down;Physical assistance for pants up; Tactile cues provided;Verbal cues provided;Visual cues provided(Max multi-modal cues for technique/self-calming)  Adaptive Equipment: Walker(BSC)    Cognitive Retraining  Safety/Judgement: Awareness of environment; Fall prevention;Home safety    Pain:  9/10 pain in RLE s/p bed mobility; nursing aware and following    Activity Tolerance:   Poor and requires frequent rest breaks    After treatment patient left in no apparent distress:   Left sidelying , daughter present, call bell within reach    COMMUNICATION/COLLABORATION:   The patients plan of care was discussed with: Physical therapist, Registered nurse, and Case management.      Sandy Calhoun OT  Time Calculation: 39 mins

## 2021-02-16 NOTE — PROGRESS NOTES
Benjamin Love, Narendra Carr, and Jitendra Avendaño Date: 2/5/2021      Subjective:     Patient awake and alert. No new issues. Hospital bed not being delivered to home until tomorrow. Home aid is starting tomorrow. ..       Current Facility-Administered Medications   Medication Dose Route Frequency    0.9% sodium chloride infusion 250 mL  250 mL IntraVENous PRN    famotidine (PEPCID) tablet 20 mg  20 mg Oral QHS    acetaminophen (TYLENOL) tablet 650 mg  650 mg Oral Q6H PRN    sodium chloride (NS) flush 5-40 mL  5-40 mL IntraVENous PRN    sodium chloride (NS) flush 5-40 mL  5-40 mL IntraVENous Q8H    naloxone (NARCAN) injection 0.4 mg  0.4 mg IntraVENous PRN    calcium-vitamin D (OS-CARLOS +D3) 500 mg-200 unit per tablet 1 Tab  1 Tab Oral TID WITH MEALS    senna-docusate (PERICOLACE) 8.6-50 mg per tablet 1 Tab  1 Tab Oral BID    polyethylene glycol (MIRALAX) packet 17 g  17 g Oral DAILY    bisacodyL (DULCOLAX) suppository 10 mg  10 mg Rectal DAILY PRN    oxyCODONE IR (ROXICODONE) tablet 2.5 mg  2.5 mg Oral Q4H PRN    enoxaparin (LOVENOX) injection 30 mg  30 mg SubCUTAneous Q12H    acetaminophen (TYLENOL) tablet 650 mg  650 mg Oral Q6H    lidocaine 4 % patch 1 Patch  1 Patch TransDERmal Q24H    traMADoL (ULTRAM) tablet 50 mg  50 mg Oral Q6H PRN          Objective:     Patient Vitals for the past 8 hrs:   BP Temp Pulse Resp SpO2   02/16/21 0917 (!) 104/50 98.2 °F (36.8 °C) 97 16 95 %   02/16/21 0629 136/75 98 °F (36.7 °C) 77 16 94 %     No intake/output data recorded. 02/14 1901 - 02/16 0700  In: -   Out: 1800 [Urine:1800]    Physical Exam: Lungs: clear to auscultation bilaterally  Heart: regular rate and rhythm, S1, S2 normal, no murmur, click, rub or gallop  Abdomen: soft, non-tender. Bowel sounds normal. No masses,  no organomegaly        Data Review No results found for this or any previous visit (from the past 24 hour(s)).         Assessment:     Principal Problem:    Hip fracture ([facility] [address] (11/14/2020)    Active Problems:    GERD (gastroesophageal reflux disease) ()      Mild dementia ([facility] [address] (2/6/2021)        Plan:     1) COnt to work with PT  2) Plan for home in AM

## 2021-02-16 NOTE — PROGRESS NOTES
Problem: Mobility Impaired (Adult and Pediatric)  Goal: *Acute Goals and Plan of Care (Insert Text)  Description: FUNCTIONAL STATUS PRIOR TO ADMISSION: Patient was ambulatory with rollator walker at baseline. Has history of falls. HOME SUPPORT PRIOR TO ADMISSION: The patient lived alone with family across the street to provide assistance. Physical Therapy Goals  Reassessed 2/15/2021  1. Patient will move from supine to sit and sit to supine  in bed with maximal assistance within 7 day(s). (GOAL MET with head of bed elevated/use of bed rail, cues, use of strap). 2.  Patient will transfer from bed to chair and chair to bed with maximal assistance x 2 using the least restrictive device within 7 day(s). (GOAL MET & upgraded to moderate assistance of 2). 3.  Patient will perform sit to stand with maximal assistance x 2 within 7 day(s). (Unable to stand while maintaining [address] status). 4.  Patient will complete HEP with modA within 7 days. (Improving toward goal). Patient has met 2 goals, which have been upgraded, is improving toward one goal, and has not made progress toward 4th goal. Continue to address as written above. Physical Therapy Goals  Initiated 2/8/2021  1. Patient will move from supine to sit and sit to supine  in bed with maximal assistance within 7 day(s). 2.  Patient will transfer from bed to chair and chair to bed with maximal assistance x 2 using the least restrictive device within 7 day(s). 3.  Patient will perform sit to stand with maximal assistance x 2 within 7 day(s). 4.  Patient will complete HEP with modA within 7 days.        Outcome: Progressing Towards Goal   PHYSICAL THERAPY TREATMENT  Patient: Yovany Powell (27 y.o. female)  Date: 2/16/2021  Diagnosis: Hip fracture ([facility] [address] [S72.009A] Hip fracture ([facility] [address]  Procedure(s) (LRB):  RIGHT HIP CONVERSION TO ARTHROPLASTY TOTAL ANTERIOR APPROACH (Right) 10 Days Post-Op  Precautions: Fall, NWB  Chart, physical therapy assessment, plan of care and goals were reviewed. ASSESSMENT  Patient continues with skilled PT services and is progressing towards goals. Patient did not do well with lateral transfers yesterday, had difficulty with motor planning despite constant cues, became frustrated. Today performed stand-squat pivot transfers with maximal assistance and gait belt (holding onto clinicians upper arms), bearing weight through LLE to pivot. Second clinician placed foot under patient's right foot to assess-maintain [address] status through RLE. Transferred from recliner to bed side commode (drop arm), then from commode to bed. Transferred toward strong side (left) each time. Daughter present for session and demonstrated understanding of transfer technique using proper body mechanics. Suspext that patient performed better as this technique is closer to what she is used to doing. Current Level of Function Impacting Discharge (mobility/balance): Maximal assistance of 1 for all transfers and bed mobility. Other factors to consider for discharge: Motivated/A & O x 4/Supportive Family/Modified independent PLOF          PLAN :  Patient continues to benefit from skilled intervention to address the above impairments. Continue treatment per established plan of care. to address goals. Recommendation for discharge: (in order for the patient to meet his/her long term goals)  Physical therapy at least 2 days/week in the home AND ensure assist and/or supervision for safety with all mobility and ADLs. This discharge recommendation:  Has been made in collaboration with the attending provider and/or case management    IF patient discharges home will need the following DME: bedside commode, hospital bed, and wheelchair       SUBJECTIVE:   Patient stated Tell me what to do and I will do it.  You know this leg (right) hurts really bad    OBJECTIVE DATA SUMMARY:   Critical Behavior:  Neurologic State: Alert  Orientation Level: Oriented X4  Cognition: Memory loss  Safety/Judgement: Awareness of environment, Fall prevention, Home safety  Functional Mobility Training:  Bed Mobility:  Rolling: Minimum assistance     Sit to Supine: Maximum assistance           Transfers:  Sit to Stand: Maximum assistance  Stand to Sit: Maximum assistance        Bed to Chair: Maximum assistance                    Balance:  Sitting: Intact  Standing: Impaired; With support  Standing - Static: Poor;Constant support  Standing - Dynamic : Poor;Constant support  Ambulation/Gait Training:                    Right Side Weight Bearing: Non-weight bearing          Activity Tolerance:   Fair    After treatment patient left in no apparent distress:   Supine in bed, Call bell within reach, Caregiver / family present, Side rails x 3, and nurse notified. COMMUNICATION/COLLABORATION:   The patients plan of care was discussed with: Occupational therapist, Registered nurse, and Case management.      Jann Duong   Time Calculation: 25 mins

## 2021-02-16 NOTE — PROGRESS NOTES
Bedside and Verbal shift change report given to Yolanda Reeves RN (oncoming nurse) by Rhonda Wills RN (offgoing nurse). Report included the following information SBAR.

## 2021-02-16 NOTE — PROGRESS NOTES
EDYTA: [facility] The wheelchair and platform drop arm bedside commode will be delivered to the hospital tomorrow.  Family is working on coordinating private duty care with [address] [place] [address] 178 and renting a hospital bed for home.  [facility] phone [phone] transport on will-call. CM called [facility] [phone] to follow-up on the status of the DME order. The DME has been approved and will be delivered to the hospital tomorrow.     Nancy Almodovar, BSW/CRM

## 2021-02-17 VITALS
RESPIRATION RATE: 16 BRPM | HEART RATE: 73 BPM | SYSTOLIC BLOOD PRESSURE: 145 MMHG | BODY MASS INDEX: 21.01 KG/M2 | HEIGHT: 60 IN | DIASTOLIC BLOOD PRESSURE: 72 MMHG | TEMPERATURE: 98.1 F | OXYGEN SATURATION: 97 % | WEIGHT: 107 LBS

## 2021-02-17 PROCEDURE — 74011250637 HC RX REV CODE- 250/637: Performed by: INTERNAL MEDICINE

## 2021-02-17 PROCEDURE — 74011250637 HC RX REV CODE- 250/637: Performed by: STUDENT IN AN ORGANIZED HEALTH CARE EDUCATION/TRAINING PROGRAM

## 2021-02-17 PROCEDURE — 74011250636 HC RX REV CODE- 250/636: Performed by: STUDENT IN AN ORGANIZED HEALTH CARE EDUCATION/TRAINING PROGRAM

## 2021-02-17 RX ORDER — POLYETHYLENE GLYCOL 3350 17 G/17G
17 POWDER, FOR SOLUTION ORAL DAILY
Qty: 30 PACKET | Refills: 0 | Status: SHIPPED | OUTPATIENT
Start: 2021-02-17

## 2021-02-17 RX ADMIN — ENOXAPARIN SODIUM 30 MG: 30 INJECTION SUBCUTANEOUS at 06:21

## 2021-02-17 RX ADMIN — ACETAMINOPHEN 650 MG: 325 TABLET ORAL at 06:21

## 2021-02-17 RX ADMIN — Medication 10 ML: at 06:00

## 2021-02-17 RX ADMIN — ACETAMINOPHEN 650 MG: 325 TABLET ORAL at 13:01

## 2021-02-17 RX ADMIN — DOCUSATE SODIUM 50 MG AND SENNOSIDES 8.6 MG 1 TABLET: 8.6; 5 TABLET, FILM COATED ORAL at 09:05

## 2021-02-17 RX ADMIN — Medication 1 TABLET: at 09:05

## 2021-02-17 NOTE — PROGRESS NOTES
I have reviewed discharge instructions with the patient. The patient and caregiver verbalized understanding. Provided with discharge instructions and elquist coupon. No further questions at this time.

## 2021-02-17 NOTE — PROGRESS NOTES
Miko Vasquez, Zack Ruth, and Nayely Klein Date: 2/5/2021      Subjective:     Patient doing OK. Hospital bed being delivered today at [place] [address].. Current Facility-Administered Medications   Medication Dose Route Frequency    0.9% sodium chloride infusion 250 mL  250 mL IntraVENous PRN    famotidine (PEPCID) tablet 20 mg  20 mg Oral QHS    acetaminophen (TYLENOL) tablet 650 mg  650 mg Oral Q6H PRN    sodium chloride (NS) flush 5-40 mL  5-40 mL IntraVENous PRN    sodium chloride (NS) flush 5-40 mL  5-40 mL IntraVENous Q8H    naloxone (NARCAN) injection 0.4 mg  0.4 mg IntraVENous PRN    calcium-vitamin D (OS-CARLOS +D3) 500 mg-200 unit per tablet 1 Tab  1 Tab Oral TID WITH MEALS    senna-docusate (PERICOLACE) 8.6-50 mg per tablet 1 Tab  1 Tab Oral BID    polyethylene glycol (MIRALAX) packet 17 g  17 g Oral DAILY    bisacodyL (DULCOLAX) suppository 10 mg  10 mg Rectal DAILY PRN    oxyCODONE IR (ROXICODONE) tablet 2.5 mg  2.5 mg Oral Q4H PRN    enoxaparin (LOVENOX) injection 30 mg  30 mg SubCUTAneous Q12H    acetaminophen (TYLENOL) tablet 650 mg  650 mg Oral Q6H    lidocaine 4 % patch 1 Patch  1 Patch TransDERmal Q24H    traMADoL (ULTRAM) tablet 50 mg  50 mg Oral Q6H PRN          Objective:     Patient Vitals for the past 8 hrs:   BP Temp Pulse Resp SpO2   02/17/21 0811 (!) 145/72 98.1 °F (36.7 °C) 73 16 97 %   02/17/21 0622 (!) 150/72 98.1 °F (36.7 °C) 76 16 96 %     02/17 0701 - 02/17 1900  In: 200 [P.O.:200]  Out: -   02/15 1901 - 02/17 0700  In: 200 [P.O.:200]  Out: 2250 [Urine:2250]    Physical Exam: Lungs: clear to auscultation bilaterally  Heart: regular rate and rhythm, S1, S2 normal, no murmur, click, rub or gallop  Abdomen: soft, non-tender. Bowel sounds normal. No masses,  no organomegaly        Data Review No results found for this or any previous visit (from the past 24 hour(s)).         Assessment:     Principal Problem:    Hip fracture ([address] (11/14/2020)    Active Problems: GERD (gastroesophageal reflux disease) ()      Mild dementia ([facility] [address] (2/6/2021)        Plan:     1) Discharge to home today

## 2021-02-17 NOTE — DISCHARGE INSTRUCTIONS
Patient Discharge Instructions    Dirk Edwards / [id number] : 3/7/1928    Admitted 2021 Discharged: 2021     Take Home Medications            · It is important that you take the medication exactly as they are prescribed. · Keep your medication in the bottles provided by the pharmacist and keep a list of the medication names, dosages, and times to be taken in your wallet. · Do not take other medications without consulting your doctor. What to do at Home    Recommended diet: Regular Diet,     Recommended activity: Activity as tolerated, work with home PT    If you experience any of the following symptoms increased pain, please follow up with Dr Galen Moritz or Orthopedics. Follow-up Appointments   Procedures    FOLLOW UP VISIT Appointment in: One Month     Standing Status:   Standing     Number of Occurrences:   1     Order Specific Question:   Appointment in     Answer:   One Month            Information obtained by :  I understand that if any problems occur once I am at home I am to contact my physician. I understand and acknowledge receipt of the instructions indicated above.                                                                                                                                            Physician's or R.N.'s Signature                                                                  Date/Time                                                                                                                                              Patient or Representative Signature                                                          Date/TimePost op Discharge Instructions Hip Fracture   Dr. Dm Cervantes, [address] [place] [id number]    Patient Name: Dirk Edwards  Date of admission:  2021  Date of procedure: 2021   Procedure: Procedure(s):  RIGHT HIP CONVERSION TO ARTHROPLASTY TOTAL ANTERIOR APPROACH  PCP: Kiana Marrero MD  Date of discharge: 2/17/21     Follow up office visit   See Dr. Robert Siddiqi approximately 2-3 weeks from date of surgery. Call [phone] (ext [phone]) to make an appointment. Activity  Walk with your walker with weight bearing restrictions as instructed by your physical therapist, Non-weight bearing. Continue using your walker until seen for follow-up visit. You should walk daily with the physical therapist.  Perform your exercise routine 3 times a day as instructed by the physical therapist.    Incision Care  The  surgical dressing is to remain on the hip for 2 days. You may remove the dressing then. If no drainage is noted, you may leave the incision exposed the air. Keep it clean and dry. You may shower, use soap and water on the incision. Pat dry when done. You may shower and get your incision wet but do not submerge your incision under water in a bath tub, hot tub or swimming pool for 6 weeks after surgery. Preventing blood clots  Eliquis 2.5 mg twice a day for 30 days, then daily 81mg Aspirin for 4 weeks      Pain management  - Please take scheduled 650 mg tylenol every 6 hours for 6 weeks    - Avoid alcoholic beverages while taking pain medication  - Do not take any over-the-counter medication for pain except Tylenol (acetaminophen)  - Please be aware that many medications contain Tylenol. We do not want you to over medicate so please read the information below as a guide.   Do not take more than 4 Grams of Tylenol in a 24 hour  - You may place an ice wrap on your hip  after exercising and as needed throughout the day and night      Diet  Resume usual diet; encourage fluids; provide foods high in fiber, calcium and vitamin D  Provide stool softeners/laxatives as needed      After [facility] for SNF/Rehab (to be followed by post-acute provider)    Nursing  Complete head to toe assessment, vital signs  Medication reconciliation  Review pain management  Manage chronic medical conditions  Remove Aquacel dressing 7 days after surgery    Physical Therapy-status at discharge from the hospital    Weight bearing status:  Precautions at Admission: Fall, NWB     Right Side Weight Bearing: Non-weight bearing    Mobility Status:  Supine to Sit: Total assistance, Assist x2     Sit to Supine: Total assistance, Assist x2       Gait:                ADL status overall composite:                Physical Therapy-exercises, transfers, gait-training (partial weight bearing on the surgical leg)    Exercises related to strengthening the surgical hip:  Supine Exercises: Ankle pumps  Quad Sets  Gluteal Sets  Hip Abduction slides supine  Hip external rotation  Heel Slides    Standing Exercises:  Heel Raises  Mini-squats  Heel/toe touches and knee bend  Marching   Hip Abduction  Hip External Rotation  Hip Extension    Advance exercises with equipment for strengthening, flexibility, balance needs as appropriate per setting. Avoid active hip flexion exercised for 4 weeks. Repetitions and number of sets to be established per patient tolerance     Reasons to call the Surgeon  1. Increased redness, swelling or drainage from the incision site  2. Temperature consistently greater than 101 degrees  3.   Increased pain or unrelieved pain in hip or calf

## 2021-02-17 NOTE — PROGRESS NOTES
Bedside and Verbal shift change report given to Joelle Yoon RN (oncoming nurse) by Cathie Arango RN (offgoing nurse). Report included the following information SBAR.

## 2021-02-17 NOTE — PROGRESS NOTES
Chart reviewed. CM met with patient and daughter. There was a miscommunication regarding the DME, and family has purchased a transport chair and drop arm beside commode on their own. The daughter stated that a standard wheelchair will not fit through the doorways in the home, and asked CM to cancel the order for both the wheelchair and bedside commode that has been ordered through [employer]. The daughter stated that the transport chair that they ordered has removable arm rests. The family has purchased a hospital bed as well. [facility] transport is set for 1 PM. Family aware. CM notified [facility] of discharge today. Medicare pt has received, reviewed, and signed 2nd IM letter informing them of their right to appeal the discharge. Signed copy has been placed on pt bedside chart.       BLAYNE Torrez/ROYA

## 2021-03-08 NOTE — DISCHARGE SUMMARY
Physician Discharge Summary     Patient ID:  Dirk Edwards  [id number]  89 y.o.  3/7/1928    Admit date: 2/5/2021    Discharge date and time: 3/7/2021    Admission Diagnoses: Hip fracture [facility]) [S72.009A]    Discharge Diagnoses:  Principal Diagnosis Hip fracture ([facility]                                            Principal Problem:    Hip fracture ([facility] (11/14/2020)    Active Problems:    GERD (gastroesophageal reflux disease) ()      Mild dementia ([facility] (2/6/2021)           Hospital Course: Patient was admitted after ground-level fall sustaining a right femoral neck fracture. She had had a right hip fracture and 1120 with a surgical repair. She also has mild dementia GE reflux and paroxysmal A. fib in the past and normal sinus rhythm now. She was seen by orthopedics and preoperative work-up was done. She was taken to the operating room and a total right hip replacement was performed. Postoperatively she did okay. She did have some issue with low urine output and this improved with fluid resuscitation. She also was found to have side effects to Lexapro which I have started her on which caused her to have decreased mental status. Once this was discontinued she perked up and did much better. She worked with physical therapy on transfers as she was not able to do weightbearing on that leg. She had home hospital bed set up as well as getting home care aides. Once this was accomplished she was discharged to home in improved and stable condition. She will continue to be followed by home health and I will see her back in the office when she is capable of doing so.     PCP:     Consults: Orthopedic Surgery        Discharge Exam:  Visit Vitals  BP (!) 145/72 (BP 1 Location: Right upper arm)   Pulse 73   Temp 98.1 °F (36.7 °C)   Resp 16   Ht 5' (1.524 m)   Wt 107 lb (48.5 kg)   SpO2 97%   BMI 20.90 kg/m²     Neck: supple, symmetrical, trachea midline, no adenopathy, thyroid: not enlarged, symmetric, no tenderness/mass/nodules, no carotid bruit and no JVD  Lungs: clear to auscultation bilaterally  Heart: regular rate and rhythm, S1, S2 normal, no murmur, click, rub or gallop  Abdomen: soft, non-tender. Bowel sounds normal. No masses,  no organomegaly  Extremities: extremities normal, atraumatic, no cyanosis or edema  Neurologic: Grossly normal    Disposition: home    Patient Instructions:   Cannot display discharge medications since this patient is not currently admitted.     Activity: Activity as tolerated  Diet: Regular Diet  Wound Care: Keep wound clean and dry    Follow-up Appointments   Procedures    FOLLOW UP VISIT Appointment in: One Month     Standing Status:   Standing     Number of Occurrences:   1     Order Specific Question:   Appointment in     Answer:   One Month          Signed:  Zehra Winters MD  3/7/2021  8:51 PM

## 2021-03-10 ENCOUNTER — TRANSCRIBE ORDER (OUTPATIENT)
Dept: SCHEDULING | Age: 86
End: 2021-03-10

## 2021-03-10 DIAGNOSIS — Z09 STATUS POST ORTHOPEDIC SURGERY, FOLLOW-UP EXAM: ICD-10-CM

## 2021-03-10 DIAGNOSIS — S72.001K CLOSED FRACTURE OF NECK OF RIGHT FEMUR WITH NONUNION: Primary | ICD-10-CM

## 2021-03-16 ENCOUNTER — HOSPITAL ENCOUNTER (OUTPATIENT)
Dept: CT IMAGING | Age: 86
Discharge: HOME | End: 2021-03-16
Attending: ORTHOPAEDIC SURGERY
Payer: MEDICARE

## 2021-03-16 DIAGNOSIS — Z09 STATUS POST ORTHOPEDIC SURGERY, FOLLOW-UP EXAM: ICD-10-CM

## 2021-03-16 DIAGNOSIS — S72.001K CLOSED FRACTURE OF NECK OF RIGHT FEMUR WITH NONUNION: ICD-10-CM

## 2021-03-16 PROCEDURE — 72192 CT PELVIS W/O DYE: CPT

## 2021-12-03 ENCOUNTER — APPOINTMENT (OUTPATIENT)
Dept: GENERAL RADIOLOGY | Age: 86
DRG: 481 | End: 2021-12-03
Attending: EMERGENCY MEDICINE
Payer: MEDICARE

## 2021-12-03 ENCOUNTER — HOSPITAL ENCOUNTER (INPATIENT)
Age: 86
LOS: 8 days | Discharge: HOME HEALTH | DRG: 481 | End: 2021-12-11
Attending: EMERGENCY MEDICINE | Admitting: HOSPITALIST
Payer: MEDICARE
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
## 2021-12-03 DIAGNOSIS — S72.002A CLOSED FRACTURE OF LEFT HIP, INITIAL ENCOUNTER (HCC): ICD-10-CM

## 2021-12-03 LAB
ALBUMIN SERPL-MCNC: 2.9 G/DL (ref 3.5–5)
ALBUMIN/GLOB SERPL: 0.9 {RATIO} (ref 1.1–2.2)
ALP SERPL-CCNC: 59 U/L (ref 45–117)
ALT SERPL-CCNC: 33 U/L (ref 12–78)
ANION GAP SERPL CALC-SCNC: 6 MMOL/L (ref 5–15)
AST SERPL-CCNC: 37 U/L (ref 15–37)
ATRIAL RATE: 87 BPM
BASOPHILS # BLD: 0 K/UL (ref 0–0.1)
BASOPHILS NFR BLD: 0 % (ref 0–1)
BILIRUB SERPL-MCNC: 0.7 MG/DL (ref 0.2–1)
BUN SERPL-MCNC: 28 MG/DL (ref 6–20)
BUN/CREAT SERPL: 36 (ref 12–20)
CALCIUM SERPL-MCNC: 8.3 MG/DL (ref 8.5–10.1)
CALCULATED P AXIS, ECG09: 68 DEGREES
CALCULATED R AXIS, ECG10: 38 DEGREES
CALCULATED T AXIS, ECG11: 39 DEGREES
CHLORIDE SERPL-SCNC: 102 MMOL/L (ref 97–108)
CO2 SERPL-SCNC: 26 MMOL/L (ref 21–32)
COMMENT, HOLDF: NORMAL
CREAT SERPL-MCNC: 0.78 MG/DL (ref 0.55–1.02)
DIAGNOSIS, 93000: NORMAL
DIFFERENTIAL METHOD BLD: ABNORMAL
EOSINOPHIL # BLD: 0 K/UL (ref 0–0.4)
EOSINOPHIL NFR BLD: 0 % (ref 0–7)
ERYTHROCYTE [DISTWIDTH] IN BLOOD BY AUTOMATED COUNT: 15.6 % (ref 11.5–14.5)
GLOBULIN SER CALC-MCNC: 3.4 G/DL (ref 2–4)
GLUCOSE SERPL-MCNC: 102 MG/DL (ref 65–100)
HCT VFR BLD AUTO: 25.6 % (ref 35–47)
HGB BLD-MCNC: 8.3 G/DL (ref 11.5–16)
HISTORY CHECKED?,CKHIST: NORMAL
IMM GRANULOCYTES # BLD AUTO: 0 K/UL (ref 0–0.04)
IMM GRANULOCYTES NFR BLD AUTO: 0 % (ref 0–0.5)
LYMPHOCYTES # BLD: 0.5 K/UL (ref 0.8–3.5)
LYMPHOCYTES NFR BLD: 5 % (ref 12–49)
MCH RBC QN AUTO: 28.3 PG (ref 26–34)
MCHC RBC AUTO-ENTMCNC: 32.4 G/DL (ref 30–36.5)
MCV RBC AUTO: 87.4 FL (ref 80–99)
MONOCYTES # BLD: 1.5 K/UL (ref 0–1)
MONOCYTES NFR BLD: 15 % (ref 5–13)
NEUTS SEG # BLD: 7.9 K/UL (ref 1.8–8)
NEUTS SEG NFR BLD: 80 % (ref 32–75)
NRBC # BLD: 0 K/UL (ref 0–0.01)
NRBC BLD-RTO: 0 PER 100 WBC
P-R INTERVAL, ECG05: 120 MS
PLATELET # BLD AUTO: 173 K/UL (ref 150–400)
POTASSIUM SERPL-SCNC: 3.8 MMOL/L (ref 3.5–5.1)
PROT SERPL-MCNC: 6.3 G/DL (ref 6.4–8.2)
Q-T INTERVAL, ECG07: 356 MS
QRS DURATION, ECG06: 70 MS
QTC CALCULATION (BEZET), ECG08: 428 MS
RBC # BLD AUTO: 2.93 M/UL (ref 3.8–5.2)
RBC MORPH BLD: ABNORMAL
SAMPLES BEING HELD,HOLD: NORMAL
SODIUM SERPL-SCNC: 134 MMOL/L (ref 136–145)
VENTRICULAR RATE, ECG03: 87 BPM
WBC # BLD AUTO: 9.9 K/UL (ref 3.6–11)

## 2021-12-03 PROCEDURE — 86923 COMPATIBILITY TEST ELECTRIC: CPT

## 2021-12-03 PROCEDURE — 99283 EMERGENCY DEPT VISIT LOW MDM: CPT

## 2021-12-03 PROCEDURE — 65270000029 HC RM PRIVATE

## 2021-12-03 PROCEDURE — 73552 X-RAY EXAM OF FEMUR 2/>: CPT

## 2021-12-03 PROCEDURE — 99284 EMERGENCY DEPT VISIT MOD MDM: CPT

## 2021-12-03 PROCEDURE — 80053 COMPREHEN METABOLIC PANEL: CPT

## 2021-12-03 PROCEDURE — 86901 BLOOD TYPING SEROLOGIC RH(D): CPT

## 2021-12-03 PROCEDURE — 85025 COMPLETE CBC W/AUTO DIFF WBC: CPT

## 2021-12-03 PROCEDURE — 74011250636 HC RX REV CODE- 250/636: Performed by: HOSPITALIST

## 2021-12-03 PROCEDURE — 93005 ELECTROCARDIOGRAM TRACING: CPT

## 2021-12-03 PROCEDURE — 74011250637 HC RX REV CODE- 250/637: Performed by: HOSPITALIST

## 2021-12-03 PROCEDURE — 36415 COLL VENOUS BLD VENIPUNCTURE: CPT

## 2021-12-03 PROCEDURE — 73502 X-RAY EXAM HIP UNI 2-3 VIEWS: CPT

## 2021-12-03 PROCEDURE — 71045 X-RAY EXAM CHEST 1 VIEW: CPT

## 2021-12-03 RX ORDER — ACETAMINOPHEN 325 MG/1
650 TABLET ORAL
Status: DISCONTINUED | OUTPATIENT
Start: 2021-12-03 | End: 2021-12-04

## 2021-12-03 RX ORDER — ONDANSETRON 2 MG/ML
4 INJECTION INTRAMUSCULAR; INTRAVENOUS
Status: DISCONTINUED | OUTPATIENT
Start: 2021-12-03 | End: 2021-12-11 | Stop reason: HOSPADM

## 2021-12-03 RX ORDER — FAMOTIDINE 20 MG/1
20 TABLET, FILM COATED ORAL
Status: DISCONTINUED | OUTPATIENT
Start: 2021-12-03 | End: 2021-12-07

## 2021-12-03 RX ORDER — SODIUM CHLORIDE 9 MG/ML
250 INJECTION, SOLUTION INTRAVENOUS AS NEEDED
Status: DISCONTINUED | OUTPATIENT
Start: 2021-12-03 | End: 2021-12-04 | Stop reason: ALTCHOICE

## 2021-12-03 RX ORDER — NALOXONE HYDROCHLORIDE 0.4 MG/ML
0.4 INJECTION, SOLUTION INTRAMUSCULAR; INTRAVENOUS; SUBCUTANEOUS AS NEEDED
Status: DISCONTINUED | OUTPATIENT
Start: 2021-12-03 | End: 2021-12-04 | Stop reason: SDUPTHER

## 2021-12-03 RX ORDER — SODIUM CHLORIDE 0.9 % (FLUSH) 0.9 %
5-40 SYRINGE (ML) INJECTION AS NEEDED
Status: DISCONTINUED | OUTPATIENT
Start: 2021-12-03 | End: 2021-12-04 | Stop reason: SDUPTHER

## 2021-12-03 RX ORDER — FACIAL-BODY WIPES
10 EACH TOPICAL
Status: DISCONTINUED | OUTPATIENT
Start: 2021-12-03 | End: 2021-12-04 | Stop reason: SDUPTHER

## 2021-12-03 RX ORDER — FERROUS SULFATE, DRIED 160(50) MG
1 TABLET, EXTENDED RELEASE ORAL 2 TIMES DAILY WITH MEALS
Status: DISCONTINUED | OUTPATIENT
Start: 2021-12-03 | End: 2021-12-04 | Stop reason: SDUPTHER

## 2021-12-03 RX ORDER — POLYETHYLENE GLYCOL 3350 17 G/17G
17 POWDER, FOR SOLUTION ORAL DAILY
Status: DISCONTINUED | OUTPATIENT
Start: 2021-12-04 | End: 2021-12-04 | Stop reason: SDUPTHER

## 2021-12-03 RX ORDER — TRAMADOL HYDROCHLORIDE 50 MG/1
50 TABLET ORAL
Status: DISCONTINUED | OUTPATIENT
Start: 2021-12-03 | End: 2021-12-04

## 2021-12-03 RX ORDER — SODIUM CHLORIDE 0.9 % (FLUSH) 0.9 %
5-40 SYRINGE (ML) INJECTION EVERY 8 HOURS
Status: DISCONTINUED | OUTPATIENT
Start: 2021-12-03 | End: 2021-12-04 | Stop reason: SDUPTHER

## 2021-12-03 RX ORDER — SODIUM CHLORIDE 9 MG/ML
75 INJECTION, SOLUTION INTRAVENOUS CONTINUOUS
Status: DISCONTINUED | OUTPATIENT
Start: 2021-12-03 | End: 2021-12-04

## 2021-12-03 RX ADMIN — FAMOTIDINE 20 MG: 20 TABLET ORAL at 22:12

## 2021-12-03 RX ADMIN — SODIUM CHLORIDE 75 ML/HR: 9 INJECTION, SOLUTION INTRAVENOUS at 16:43

## 2021-12-03 RX ADMIN — Medication 10 ML: at 22:00

## 2021-12-03 RX ADMIN — ACETAMINOPHEN 650 MG: 325 TABLET ORAL at 17:05

## 2021-12-03 RX ADMIN — Medication 5 ML: at 17:00

## 2021-12-03 RX ADMIN — ACETAMINOPHEN 650 MG: 325 TABLET ORAL at 22:32

## 2021-12-03 NOTE — PROGRESS NOTES
Problem: Pressure Injury - Risk of  Goal: *Prevention of pressure injury  Description: Document Ricardo Scale and appropriate interventions in the flowsheet. Outcome: Progressing Towards Goal  Note: Pressure Injury Interventions:  Sensory Interventions: Float heels, Minimize linen layers    Moisture Interventions: Absorbent underpads    Activity Interventions: Pressure redistribution bed/mattress(bed type)    Mobility Interventions: HOB 30 degrees or less, Pressure redistribution bed/mattress (bed type)    Nutrition Interventions: Document food/fluid/supplement intake    Friction and Shear Interventions: Lift sheet, Minimize layers                Problem: Patient Education: Go to Patient Education Activity  Goal: Patient/Family Education  Outcome: Progressing Towards Goal     Problem: Falls - Risk of  Goal: *Absence of Falls  Description: Document Don Fall Risk and appropriate interventions in the flowsheet.   Outcome: Progressing Towards Goal  Note: Fall Risk Interventions:            Medication Interventions: Evaluate medications/consider consulting pharmacy    Elimination Interventions: Call light in reach, Patient to call for help with toileting needs              Problem: Patient Education: Go to Patient Education Activity  Goal: Patient/Family Education  Outcome: Progressing Towards Goal

## 2021-12-03 NOTE — ED TRIAGE NOTES
Pt coming from home and had fall yesterday, pain to left groin. Left leg shortened and rotated. Imaging done by pcp showed likely femur fx.

## 2021-12-03 NOTE — ED PROVIDER NOTES
44-year-old female history of A. fib, cancer, GERD, and mild dementia presents to the emergency department today by EMS after concern for a fall yesterday. She apparently fell in the tub yesterday injuring her left hip. Review of the medical record indicates that she had outpatient x-ray, there are no results available but EMS reports that the patient was called earlier this morning with a positive left hip fracture. Patient denies any other injuries, she has some mild pain at the left hip. The history is provided by the patient and medical records. Fall  The accident occurred yesterday. The fall occurred while standing. She fell from a height of ground level. There was no blood loss. The point of impact was the left hip. The pain is present in the left hip. The pain is mild. She was not ambulatory at the scene. There was no entrapment after the fall. There was no drug use involved in the accident. There was no alcohol use involved in the accident. Pertinent negatives include no fever, no abdominal pain, no nausea, no vomiting, no headaches, no loss of consciousness and no laceration. The risk factors include dementia. The symptoms are aggravated by use of injured limb. She has tried nothing for the symptoms.         Past Medical History:   Diagnosis Date    Atrial fibrillation ([address]     Cancer ([address]     radiation and lumpectony    GERD (gastroesophageal reflux disease)     Hearing impaired     Mild dementia ([address] 2/6/2021    Paroxysmal atrial fibrillation ([address]     a.fib 11/2010,    NS 02/2021       Past Surgical History:   Procedure Laterality Date    HX ORTHOPAEDIC      right hip    IR KYPHOPLASTY LUMBAR  8/19/2019    VT BREAST SURGERY PROCEDURE UNLISTED           Family History:   Problem Relation Age of Onset    Hypertension Mother     Heart Disease Mother     Cancer Mother         breast    Cancer Father         prostate       Social History     Socioeconomic History    Marital status:      Spouse name: Not on file    Number of children: Not on file    Years of education: Not on file    Highest education level: Not on file   Occupational History    Not on file   Tobacco Use    Smoking status: Former Smoker    Smokeless tobacco: Never Used   Substance and Sexual Activity    Alcohol use: No    Drug use: No    Sexual activity: Not on file   Other Topics Concern    Not on file   Social History Narrative    Not on file     Social Determinants of Health     Financial Resource Strain:     Difficulty of Paying Living Expenses: Not on file   Food Insecurity:     Worried About [address] in the Last Year: Not on file    [address] in the Last Year: Not on file   Transportation Needs:     Lack of Transportation (Medical): Not on file    Lack of Transportation (Non-Medical): Not on file   Physical Activity:     Days of Exercise per Week: Not on file    Minutes of Exercise per Session: Not on file   Stress:     Feeling of Stress : Not on file   Social Connections:     Frequency of Communication with Friends and Family: Not on file    Frequency of Social Gatherings with Friends and Family: Not on file    Attends Nondenominational Services: Not on file    Active Member of [address] [place] [address] or Organizations: Not on file    Attends Club or Organization Meetings: Not on file    Marital Status: Not on file   Intimate Partner Violence:     Fear of Current or Ex-Partner: Not on file    Emotionally Abused: Not on file    Physically Abused: Not on file    Sexually Abused: Not on file   Housing Stability:     Unable to Pay for Housing in the Last Year: Not on file    Number of [place] in the Last Year: Not on file    Unstable Housing in the Last Year: Not on file         ALLERGIES: Ciprofloxacin, Codeine, Hydrocodone, Lexapro [escitalopram], Morphine, and Oxycodone    Review of Systems   Constitutional: Negative for fatigue and fever. HENT: Negative for sneezing and sore throat. Respiratory: Negative for cough and shortness of breath. Cardiovascular: Negative for chest pain and leg swelling. Gastrointestinal: Negative for abdominal pain, diarrhea, nausea and vomiting. Genitourinary: Negative for difficulty urinating and dysuria. Musculoskeletal: Positive for gait problem. Negative for arthralgias and myalgias. Skin: Negative for color change and rash. Neurological: Negative for loss of consciousness, weakness and headaches. Psychiatric/Behavioral: Negative for agitation and behavioral problems. There were no vitals filed for this visit. Physical Exam  Vitals and nursing note reviewed. Constitutional:       General: She is not in acute distress. Appearance: Normal appearance. She is well-developed. She is not ill-appearing, toxic-appearing or diaphoretic. HENT:      Head: Normocephalic and atraumatic. Nose: Nose normal.      Mouth/Throat:      Mouth: Mucous membranes are moist.      Pharynx: Oropharynx is clear. Eyes:      Extraocular Movements: Extraocular movements intact. Conjunctiva/sclera: Conjunctivae normal.      Pupils: Pupils are equal, round, and reactive to light. Cardiovascular:      Rate and Rhythm: Normal rate and regular rhythm. Pulses: Normal pulses. Heart sounds: Normal heart sounds. Pulmonary:      Effort: Pulmonary effort is normal. No respiratory distress. Breath sounds: Normal breath sounds. No wheezing. Chest:      Chest wall: No tenderness. Abdominal:      General: Abdomen is flat. There is no distension. Palpations: Abdomen is soft. Tenderness: There is no abdominal tenderness. There is no guarding or rebound. Musculoskeletal:         General: Tenderness present. No swelling or signs of injury. Normal range of motion. Cervical back: Normal range of motion and neck supple. No rigidity. No muscular tenderness. Right lower leg: No edema. Left lower leg: No edema. Comments: There is mild tenderness over the left hip with lateral compression. The left leg is slightly shortened and externally rotated   Skin:     General: Skin is warm and dry. Capillary Refill: Capillary refill takes less than 2 seconds. Findings: No laceration. Neurological:      General: No focal deficit present. Mental Status: She is alert and oriented to person, place, and time. Psychiatric:         Mood and Affect: Mood normal.         Behavior: Behavior normal.          MDM  Number of Diagnoses or Management Options  Diagnosis management comments: 42-year-old female presents as above after fall yesterday with left hip fracture. Discussed with orthopedics. Plan to admit to the hospitalist for operative repair. Amount and/or Complexity of Data Reviewed  Tests in the radiology section of CPT®: reviewed  Tests in the medicine section of CPT®: reviewed      ED Course as of 12/03/21 1454   Fri Dec 03, 2021   1343 ED EKG interpretation:Rhythm: normal sinus rhythm. Rate (approx.): 87.  Axis: normal.  ST segment:  No concerning ST elevations or depressions. This EKG was interpreted by Dixie Kanner, MD,ED Provider. [JM]      ED Course User Index  [JM] Georges Paulino MD       Procedures            Perfect Serve Consult for Admission  2:55 PM    ED Room Number: ER05/05  Patient Name and age:  Tim Palmer 80 y.o.  female  Working Diagnosis:   1. Fall, initial encounter    2. Closed fracture of left hip, initial encounter ([facility]        COVID-19 Suspicion:  no  Sepsis present:  no  Reassessment needed: N/A  Code Status:  Full Code  Readmission: no  Isolation Requirements:  no  Recommended Level of Care:  telemetry  Department:[facility] Adult ED - 21   Other: Discussed with , orthopedics. History of A. fib.

## 2021-12-03 NOTE — PROGRESS NOTES
Primary Nurse Celine Chandra RN and SHITAL Roblero performed a dual skin assessment on this patient Impairment noted- see wound doc flow sheet. Abrasion on left elbow associated with fall. Bruising found on upper and lower extremities. Notable bruise on right lower shin. Ricardo score is 15.

## 2021-12-03 NOTE — PROGRESS NOTES
Reason for Admission:  Left Intertrochanteric Femur Fracture                     RUR Score:    13%                 Plan for utilizing home health: Daughter is receptive of [place]     PCP: First and Last name:  Alyson Tellez MD     Name of Practice:    Are you a current patient: Yes/No: Yes    Approximate date of last visit: 9/24/20   Can you participate in a virtual visit with your PCP:                     Current Advanced Directive/Advance Care Plan: Prior      Healthcare Decision Maker:   Click here to complete [address] including selection of the [address] Relationship (ie \"Primary\")                             Transition of Care Plan:  Met w/ daughter Malaika Carmichael [phone] and Caregiver Junior Kelly - introduced to role of CM (patient was off unit at time of visit). History provided by Ms. Jessica Sánchez - patient lives at home and has caregiving from family and Caregivers (continuous). Home is 1 story and has 1 step to enter. Per daughter we have all the equipment (including hospital bed/ [facility]). Noted previous provider [facility]. Nahid Milner expressed concerns w/ previous ED encounter and hospitalization (extended time spent in ED awaiting room and 12 hospitalization) and how patient's recuperation was affected. Informed w/ age >90 saw increase in dementia as a result of surgeries and having anesthesia. Nahid Milner expressed the need to have Caregiver to be w/ patient during hospitalization and asked if insurance would cover. CM noted medicare / [employer] and informed caregiver is not covered service. Daughter will need to clarify w/ Inpatient Unit regarding visitation policy. Care Management Interventions  PCP Verified by CM:  Yes  Last Visit to PCP: 09/24/20  Palliative Care Criteria Met (RRAT>21 & CHF Dx)?: No  Transition of Care Consult (CM Consult): Discharge Planning  MyChart Signup: No  Discharge Durable Medical Equipment: No  Health Maintenance Reviewed: Yes  Physical Therapy Consult: No  Occupational Therapy Consult: No  Speech Therapy Consult: No  Support Systems: Caregiver/Home Care Staff, Child(alpa)  [place] Resource Information Provided?: No  Discharge Location  Discharge Placement:  (TBD)      CM will follow for transitional care needs.

## 2021-12-03 NOTE — CONSULTS
Geriatric Fracture Consult  Patient: Lola Montez  YOB: 1928   MRN: [id number]      Consult Date: 12/3/2021     Chief Complaint: Left hip pain  ED Presentation Time: < 8 hours  Mechanism of Injury: Lauren Carmichaelist in bathroom at home  Ambulatory Status: Geraldo Harrington  Past Medical History:   Past Medical History:   Diagnosis Date    At[facility] fibrillation ([facility]     Cancer ([facility]     radiation and lumpectony    GERD (gastroesophageal reflux disease)     Hearing impaired     Mild dementia ([facility] 2/6/2021    Paroxysmal atrial fibrillation ([facility]     a.fib 11/2010,    NS 02/2021       Allergies:    Allergies   Allergen Reactions    Ciprofloxacin Other (comments)     Bad yeast inf    Codeine Nausea and Vomiting    Hydrocodone Other (comments)     \"burning the esophagus to the point patient can't swallow per family and patient\"    Lexapro [Escitalopram] Other (comments)     lethargy    Morphine Nausea Only    Oxycodone Other (comments)     \"burning of esophagus\"      Past Surgical History:   Past Surgical History:   Procedure Laterality Date    HX ORTHOPAEDIC      right hip    IR KYPHOPLASTY LUMBAR  8/19/2019    [place] BREAST SURGERY PROCEDURE UNLISTED        Social History:   Social History     Socioeconomic History    Marital status:      Spouse name: Not on file    Number of children: Not on file    Years of education: Not on file    Highest education level: Not on file   Occupational History    Not on file   Tobacco Use    Smoking status: Former Smoker    Smokeless tobacco: Never Used   Substance and Sexual Activity    Alcohol use: No    Drug use: No    Sexual activity: Not on file   Other Topics Concern    Not on file   Social History Narrative    Not on file     Social Determinants of Health     Financial Resource Strain:     Difficulty of Paying Living Expenses: Not on file   Food Insecurity:     Worried About [address] [employer] in the Last Year: Not on file    [address] [facility] [address] in the Last Year: Not on file   Transportation Needs:     Lack of Transportation (Medical): Not on file    Lack of Transportation (Non-Medical): Not on file   Physical Activity:     Days of Exercise per Week: Not on file    Minutes of Exercise per Session: Not on file   Stress:     Feeling of Stress : Not on file   Social Connections:     Frequency of Communication with Friends and Family: Not on file    Frequency of Social Gatherings with Friends and Family: Not on file    Attends Mandaen Services: Not on file    Active Member of Clubs or Organizations: Not on file    Attends Club or Organization Meetings: Not on file    Marital Status: Not on file   Intimate Partner Violence:     Fear of Current or Ex-Partner: Not on file    Emotionally Abused: Not on file    Physically Abused: Not on file    Sexually Abused: Not on file   Housing Stability:     Unable to Pay for Housing in the Last Year: Not on file    Number of [place] in the Last Year: Not on file    Unstable Housing in the Last Year: Not on file      Dwelling Status: Lives with daughter  Current Anticoagulant Medications: none  History of falls: YES  Prior Fractures: Hip/Pelvis: Right femoral neck fx - Hip pinning Nov 2020 conversion to total hip Feb 2021    Labs:    Lab Results   Component Value Date/Time    HGB 8.1 (L) 02/15/2021 06:00 AM    WBC 4.6 02/15/2021 06:00 AM    INR 1.0 02/13/2021 07:11 AM    Albumin 3.6 02/05/2021 05:33 PM        X-RAYS:   XR HIP LT W OR WO PELV 2-3 VWS, XR FEMUR LT 2 V 12/3/2021 14:19  INDICATION: fall. COMPARISON: 2/6/2021, CT 3/16/2021. FINDINGS: AP view of the pelvis and a frogleg lateral view of the left hip;  frontal and lateral radiographs of the left femur. An intertrochanteric left  femoral fracture is comminuted, angulated, and superiorly displaced. Associated  lucency in the intertrochanteric region is likely related to fracture, as there  was no lucent lesion on prior CT or radiograph.   Post right hip replacement, with extensive acetabular protrusio. There are old  healed right superior and inferior pubic ramus fractures. The patient is  osteoporotic. IMPRESSION  Comminuted, angulated, displaced fracture of the left intertrochanteric femur. Physical Exam:   General: 79yo female, pleasant/cooperative, no distress, appears stated age, daughter at bedside  CNS: alert/oriented, normal mood  Vascular: pedal pulses normal and no edema   Skin: no rash or abnormalities   Extremities: left leg shortened and externally rotated, pain with passive ROM left hip  Neurologic: moving left foot/ankle normally, no acute sensory deficits    Assessment: Left intertrochanteric femur fracture    Plan: Discussed diagnosis and treatment plan with patient and her daughter at length. She seems to have recovered well from her right hip fracture and surgery last year. Unfortunately, her current left hip fracture is also best managed surgically. Recommend IM nail of the left hip. We discussed the surgery in detail including expected hospital course and recovery. All questions were answered and they are in agreement with plan as outlined. As they did last year, patient and family plan to return home with HHPT following discharge. Plan for Left hip IM nail 12/4/2021 by Dr. Evert Gan. NPO after midnight.       Signed by: QUINTIN Agudelo   Today's Date: December 3, 2021

## 2021-12-03 NOTE — H&P
History & Physical    Primary Care Provider: Guanako Fowler MD  Source of Information: EMS, pt, family     History of Presenting Illness:   Ernestene Fleischer is a 80 y.o. female who presents with fall    80year-old female history of A. fib, breast cancer, GERD, and mild dementia presents to the emergency department today by EMS after concern for a fall yesterday. She apparently fell in the bathroom yesterday injuring her left hip. The fall occurred while walking in the bathroom with her walker. She fell from a height of ground level. There was no blood loss. The point of impact was the left hip. The pain is present in the left hip. The pain is mild. She was not ambulatory at the scene, and caregiver helped her back to bed. she had outpatient x-ray yesterday, there are no results available but EMS reports that the patient was called earlier this morning with a positive left hip fracture. Patient denies any other injuries, she has some mild pain at the left hip.     denied no fever, no abdominal pain, no nausea, no vomiting, no headaches, no loss of consciousness and no laceration. Review of Systems:  General: HPI, no changes of weight  HEENT: no headache, no vision changes, no nose discharge, no hearing changes   RES: no wheezing, no cough, no sob  CVS: no cp, no palpitation. Muscular: HPI.    Skin: no rash, no itching   GI: no vomiting, no diarrhea  : no dysuria, no hematuria  Hemo: no gum bleeding, no petechial   Neuro: no sensation changes, no focal weakness   Endo: no polydipsia   Psych: denied depression     Past Medical History:   Diagnosis Date    Atrial fibrillation ([address]     Cancer ([address]     radiation and lumpectony    GERD (gastroesophageal reflux disease)     Hearing impaired     Mild dementia ([address] 2/6/2021    Paroxysmal atrial fibrillation ([address]     a.fib 11/2010,    NS 02/2021      Past Surgical History:   Procedure Laterality Date    HX ORTHOPAEDIC right hip    IR KYPHOPLASTY LUMBAR  8/19/2019    DC BREAST SURGERY PROCEDURE UNLISTED       Prior to Admission medications    Medication Sig Start Date End Date Taking? Authorizing Provider   famotidine (PEPCID) 40 mg tablet Take 1 Tab by mouth nightly. 3/4/21   Johnathan Fowler MD   polyethylene glycol (MIRALAX) 17 gram packet Take 1 Packet by mouth daily. 2/17/21   Johnathan Fowler MD   acetaminophen (TYLENOL) 325 mg tablet Take 2 Tabs by mouth every six (6) hours as needed for Pain. 11/19/20   Johnathan Fowler MD   bisacodyL (DULCOLAX) 10 mg supp Insert 10 mg into rectum daily as needed for Constipation (For unrelieved constipation. ). 11/19/20   Johnathan Fowler MD   calcium-vitamin D (OS-CARLOS +D3) 500 mg-200 unit per tablet Take 1 Tab by mouth two (2) times daily (with meals). 11/19/20   Johnathan Fowler MD     Allergies   Allergen Reactions    Ciprofloxacin Other (comments)     Bad yeast inf    Codeine Nausea and Vomiting    Hydrocodone Other (comments)     \"burning the esophagus to the point patient can't swallow per family and patient\"    Lexapro [Escitalopram] Other (comments)     lethargy    Morphine Nausea Only    Oxycodone Other (comments)     \"burning of esophagus\"      Family History   Problem Relation Age of Onset    Hypertension Mother     Heart Disease Mother     Cancer Mother         breast    Cancer Father         prostate        SOCIAL HISTORY:  Patient resides:  Independently    Assisted Living    SNF    With family care x      Smoking history:   None x   Former    Chronic      Alcohol history:   None x   Social    Chronic      Ambulates:   Independently    w/cane    w/walker x   w/wc    CODE STATUS:  DNR    Full x   Other      Objective:     Physical Exam:     Visit Vitals  /61   Pulse 89   Temp 97.8 °F (36.6 °C)   Resp 18   SpO2 96%      O2 Device: None (Room air)    General:  Alert, cooperative, no distress, appears stated age.    Head:  Normocephalic, without obvious abnormality, atraumatic. Eyes:  Conjunctivae/corneas clear. PERRL, EOMs intact. Nose: Nares normal. Septum midline. Mucosa normal. No drainage or sinus tenderness. Throat: Lips, mucosa, and tongue normal. Teeth and gums normal.   Neck: Supple, symmetrical, trachea midline, no adenopathy, thyroid: no enlargement/tenderness/nodules, no carotid bruit and no JVD. Back:   Symmetric, no curvature. ROM normal. No CVA tenderness. Lungs:   Clear to auscultation bilaterally. Chest wall:  No tenderness or deformity. Heart:  Regular rate and rhythm, S1, S2 normal, no murmur, click, rub or gallop. Abdomen:   Soft, non-tender. Bowel sounds normal. No masses,  No organomegaly. Extremities: Left leg mild short, hip rom limited due to pain    Pulses: 2+ and symmetric all extremities. Skin: Skin color, texture, turgor normal. No rashes or lesions   Neurologic: CNII-XII intact. No focal weakness            Data Review:     Recent Days:  No results for input(s): WBC, HGB, HCT, PLT, HGBEXT, HCTEXT, PLTEXT in the last 72 hours. No results for input(s): NA, K, CL, CO2, GLU, BUN, CREA, CA, MG, PHOS, ALB, TBIL, ALT, INR, INREXT in the last 72 hours. No lab exists for component: SGOT  No results for input(s): PH, PCO2, PO2, HCO3, FIO2 in the last 72 hours.     24 Hour Results:  Recent Results (from the past 24 hour(s))   EKG, 12 LEAD, INITIAL    Collection Time: 12/03/21  1:30 PM   Result Value Ref Range    Ventricular Rate 87 BPM    Atrial Rate 87 BPM    P-R Interval 120 ms    QRS Duration 70 ms    Q-T Interval 356 ms    QTC Calculation (Bezet) 428 ms    Calculated P Axis 68 degrees    Calculated R Axis 38 degrees    Calculated T Axis 39 degrees    Diagnosis       Sinus rhythm with premature atrial complexes  Otherwise normal ECG  When compared with ECG of 05-FEB-2021 17:27,  premature atrial complexes are now present  Nonspecific T wave abnormality now evident in Anterior leads  Nonspecific T wave abnormality no longer evident in Lateral leads           Imaging:   XR CHEST SNGL V    Result Date: 12/3/2021  Cardiomegaly. Clear lungs. XR HIP LT W OR WO PELV 2-3 VWS    Result Date: 12/3/2021  Comminuted, angulated, displaced fracture of the left intertrochanteric femur. XR FEMUR LT 2 V    Result Date: 12/3/2021  Comminuted, angulated, displaced fracture of the left intertrochanteric femur. Assessment:     Active Problems:    * No active hospital problems. *         Plan:     1. Left hip fracture: orthoconsult. Plan or tomorrow. Npo after midnight. Use tylenol for pain now. Pt not tolerate narcotics well in the past and would like to avoid if possible. May use tramadol  if pain is severe. 2. GERD: continue pepcid   3. Medical clearance: EGK reviewed, no cp, no acute CHF, no further cardiac work up now.         Signed By: Reva Michael MD     December 3, 2021

## 2021-12-03 NOTE — ED NOTES
TRANSFER - OUT REPORT:    Verbal report given to Benoit Pinto (name) on Brandy Medina  being transferred to [facility](unit) for routine progression of care       Report consisted of patients Situation, Background, Assessment and   Recommendations(SBAR). Information from the following report(s) SBAR, Kardex, ED Summary, Intake/Output, MAR and Recent Results was reviewed with the receiving nurse. Lines:       Opportunity for questions and clarification was provided.

## 2021-12-04 ENCOUNTER — APPOINTMENT (OUTPATIENT)
Dept: GENERAL RADIOLOGY | Age: 86
DRG: 481 | End: 2021-12-04
Attending: ORTHOPAEDIC SURGERY
Payer: MEDICARE

## 2021-12-04 ENCOUNTER — APPOINTMENT (OUTPATIENT)
Dept: GENERAL RADIOLOGY | Age: 86
DRG: 481 | End: 2021-12-04
Attending: HOSPITALIST
Payer: MEDICARE

## 2021-12-04 ENCOUNTER — ANESTHESIA (OUTPATIENT)
Dept: SURGERY | Age: 86
DRG: 481 | End: 2021-12-04
Payer: MEDICARE

## 2021-12-04 ENCOUNTER — ANESTHESIA EVENT (OUTPATIENT)
Dept: SURGERY | Age: 86
DRG: 481 | End: 2021-12-04
Payer: MEDICARE

## 2021-12-04 PROCEDURE — 74011000250 HC RX REV CODE- 250: Performed by: PHYSICIAN ASSISTANT

## 2021-12-04 PROCEDURE — C1769 GUIDE WIRE: HCPCS | Performed by: ORTHOPAEDIC SURGERY

## 2021-12-04 PROCEDURE — 99233 SBSQ HOSP IP/OBS HIGH 50: CPT | Performed by: ORTHOPAEDIC SURGERY

## 2021-12-04 PROCEDURE — 2709999900 HC NON-CHARGEABLE SUPPLY: Performed by: ORTHOPAEDIC SURGERY

## 2021-12-04 PROCEDURE — 65270000029 HC RM PRIVATE

## 2021-12-04 PROCEDURE — 74011250637 HC RX REV CODE- 250/637: Performed by: PHYSICIAN ASSISTANT

## 2021-12-04 PROCEDURE — C1713 ANCHOR/SCREW BN/BN,TIS/BN: HCPCS | Performed by: ORTHOPAEDIC SURGERY

## 2021-12-04 PROCEDURE — 74011250637 HC RX REV CODE- 250/637: Performed by: NURSE PRACTITIONER

## 2021-12-04 PROCEDURE — 76210000000 HC OR PH I REC 2 TO 2.5 HR: Performed by: ORTHOPAEDIC SURGERY

## 2021-12-04 PROCEDURE — 77030041279 HC DRSG PRMSL AG MDII -B: Performed by: ORTHOPAEDIC SURGERY

## 2021-12-04 PROCEDURE — 77030000031 HC BIT DRL QC SYNT -C: Performed by: ORTHOPAEDIC SURGERY

## 2021-12-04 PROCEDURE — 74011250636 HC RX REV CODE- 250/636: Performed by: NURSE ANESTHETIST, CERTIFIED REGISTERED

## 2021-12-04 PROCEDURE — 77030014405 HC GD ROD RMR SYNT -C: Performed by: ORTHOPAEDIC SURGERY

## 2021-12-04 PROCEDURE — 74011000250 HC RX REV CODE- 250: Performed by: ANESTHESIOLOGY

## 2021-12-04 PROCEDURE — 74011250636 HC RX REV CODE- 250/636: Performed by: PHYSICIAN ASSISTANT

## 2021-12-04 PROCEDURE — 0QS706Z REPOSITION LEFT UPPER FEMUR WITH INTRAMEDULLARY INTERNAL FIXATION DEVICE, OPEN APPROACH: ICD-10-PCS | Performed by: ORTHOPAEDIC SURGERY

## 2021-12-04 PROCEDURE — 27245 TREAT THIGH FRACTURE: CPT | Performed by: PHYSICIAN ASSISTANT

## 2021-12-04 PROCEDURE — 74011250637 HC RX REV CODE- 250/637: Performed by: HOSPITALIST

## 2021-12-04 PROCEDURE — 77030018673: Performed by: ORTHOPAEDIC SURGERY

## 2021-12-04 PROCEDURE — 74011250636 HC RX REV CODE- 250/636: Performed by: ANESTHESIOLOGY

## 2021-12-04 PROCEDURE — 77030008462 HC STPLR SKN PROX J&J -A: Performed by: ORTHOPAEDIC SURGERY

## 2021-12-04 PROCEDURE — 77030013079 HC BLNKT BAIR HGGR 3M -A: Performed by: ANESTHESIOLOGY

## 2021-12-04 PROCEDURE — 73501 X-RAY EXAM HIP UNI 1 VIEW: CPT

## 2021-12-04 PROCEDURE — 77030002933 HC SUT MCRYL J&J -A: Performed by: ORTHOPAEDIC SURGERY

## 2021-12-04 PROCEDURE — 74011000250 HC RX REV CODE- 250: Performed by: NURSE ANESTHETIST, CERTIFIED REGISTERED

## 2021-12-04 PROCEDURE — 77030031139 HC SUT VCRL2 J&J -A: Performed by: ORTHOPAEDIC SURGERY

## 2021-12-04 PROCEDURE — 76060000036 HC ANESTHESIA 2.5 TO 3 HR: Performed by: ORTHOPAEDIC SURGERY

## 2021-12-04 PROCEDURE — 27245 TREAT THIGH FRACTURE: CPT | Performed by: ORTHOPAEDIC SURGERY

## 2021-12-04 PROCEDURE — 77030007866 HC KT SPN ANES BBMI -B: Performed by: ANESTHESIOLOGY

## 2021-12-04 PROCEDURE — 76010000132 HC OR TIME 2.5 TO 3 HR: Performed by: ORTHOPAEDIC SURGERY

## 2021-12-04 DEVICE — 10MM/130 DEG TI CANN TFNA 360MM/LEFT - STERILE
Type: IMPLANTABLE DEVICE | Site: FEMUR | Status: FUNCTIONAL
Brand: TFN-ADVANCE

## 2021-12-04 DEVICE — IMPLANTABLE DEVICE: Type: IMPLANTABLE DEVICE | Site: FEMUR | Status: FUNCTIONAL

## 2021-12-04 DEVICE — SCREW BNE L36MM DIA5MM NONSTERILE TIB LT GRN TI ST CANN LOK: Type: IMPLANTABLE DEVICE | Site: FEMUR | Status: FUNCTIONAL

## 2021-12-04 DEVICE — SCREW BNE L34MM DIA5MM NONSTERILE TIB LT GRN TI ST CANN LOK: Type: IMPLANTABLE DEVICE | Site: FEMUR | Status: FUNCTIONAL

## 2021-12-04 RX ORDER — POLYETHYLENE GLYCOL 3350 17 G/17G
17 POWDER, FOR SOLUTION ORAL DAILY
Status: DISCONTINUED | OUTPATIENT
Start: 2021-12-05 | End: 2021-12-11 | Stop reason: HOSPADM

## 2021-12-04 RX ORDER — SODIUM CHLORIDE 0.9 % (FLUSH) 0.9 %
5-40 SYRINGE (ML) INJECTION AS NEEDED
Status: DISCONTINUED | OUTPATIENT
Start: 2021-12-04 | End: 2021-12-11 | Stop reason: HOSPADM

## 2021-12-04 RX ORDER — HYDROMORPHONE HYDROCHLORIDE 1 MG/ML
0.2 INJECTION, SOLUTION INTRAMUSCULAR; INTRAVENOUS; SUBCUTANEOUS
Status: DISCONTINUED | OUTPATIENT
Start: 2021-12-04 | End: 2021-12-04 | Stop reason: HOSPADM

## 2021-12-04 RX ORDER — SODIUM CHLORIDE, SODIUM LACTATE, POTASSIUM CHLORIDE, CALCIUM CHLORIDE 600; 310; 30; 20 MG/100ML; MG/100ML; MG/100ML; MG/100ML
INJECTION, SOLUTION INTRAVENOUS
Status: DISCONTINUED | OUTPATIENT
Start: 2021-12-04 | End: 2021-12-04 | Stop reason: HOSPADM

## 2021-12-04 RX ORDER — TRANEXAMIC ACID 100 MG/ML
INJECTION, SOLUTION INTRAVENOUS AS NEEDED
Status: DISCONTINUED | OUTPATIENT
Start: 2021-12-04 | End: 2021-12-04 | Stop reason: HOSPADM

## 2021-12-04 RX ORDER — MIDAZOLAM HYDROCHLORIDE 1 MG/ML
1 INJECTION, SOLUTION INTRAMUSCULAR; INTRAVENOUS AS NEEDED
Status: DISCONTINUED | OUTPATIENT
Start: 2021-12-04 | End: 2021-12-04 | Stop reason: HOSPADM

## 2021-12-04 RX ORDER — FENTANYL CITRATE 50 UG/ML
50 INJECTION, SOLUTION INTRAMUSCULAR; INTRAVENOUS AS NEEDED
Status: DISCONTINUED | OUTPATIENT
Start: 2021-12-04 | End: 2021-12-04 | Stop reason: HOSPADM

## 2021-12-04 RX ORDER — ENOXAPARIN SODIUM 100 MG/ML
40 INJECTION SUBCUTANEOUS DAILY
Status: DISCONTINUED | OUTPATIENT
Start: 2021-12-05 | End: 2021-12-05

## 2021-12-04 RX ORDER — SODIUM CHLORIDE 9 MG/ML
25 INJECTION, SOLUTION INTRAVENOUS CONTINUOUS
Status: DISCONTINUED | OUTPATIENT
Start: 2021-12-04 | End: 2021-12-04 | Stop reason: HOSPADM

## 2021-12-04 RX ORDER — SODIUM CHLORIDE 0.9 % (FLUSH) 0.9 %
5-40 SYRINGE (ML) INJECTION EVERY 8 HOURS
Status: DISCONTINUED | OUTPATIENT
Start: 2021-12-04 | End: 2021-12-04 | Stop reason: HOSPADM

## 2021-12-04 RX ORDER — DIPHENHYDRAMINE HYDROCHLORIDE 50 MG/ML
12.5 INJECTION, SOLUTION INTRAMUSCULAR; INTRAVENOUS AS NEEDED
Status: DISCONTINUED | OUTPATIENT
Start: 2021-12-04 | End: 2021-12-04 | Stop reason: HOSPADM

## 2021-12-04 RX ORDER — NALOXONE HYDROCHLORIDE 0.4 MG/ML
0.4 INJECTION, SOLUTION INTRAMUSCULAR; INTRAVENOUS; SUBCUTANEOUS AS NEEDED
Status: DISCONTINUED | OUTPATIENT
Start: 2021-12-04 | End: 2021-12-11 | Stop reason: HOSPADM

## 2021-12-04 RX ORDER — SODIUM CHLORIDE, SODIUM LACTATE, POTASSIUM CHLORIDE, CALCIUM CHLORIDE 600; 310; 30; 20 MG/100ML; MG/100ML; MG/100ML; MG/100ML
100 INJECTION, SOLUTION INTRAVENOUS CONTINUOUS
Status: DISCONTINUED | OUTPATIENT
Start: 2021-12-04 | End: 2021-12-04 | Stop reason: HOSPADM

## 2021-12-04 RX ORDER — SODIUM CHLORIDE, SODIUM LACTATE, POTASSIUM CHLORIDE, CALCIUM CHLORIDE 600; 310; 30; 20 MG/100ML; MG/100ML; MG/100ML; MG/100ML
25 INJECTION, SOLUTION INTRAVENOUS CONTINUOUS
Status: DISCONTINUED | OUTPATIENT
Start: 2021-12-04 | End: 2021-12-04 | Stop reason: HOSPADM

## 2021-12-04 RX ORDER — ACETAMINOPHEN 500 MG
1000 TABLET ORAL EVERY 8 HOURS
Status: DISCONTINUED | OUTPATIENT
Start: 2021-12-04 | End: 2021-12-06

## 2021-12-04 RX ORDER — SODIUM CHLORIDE 9 MG/ML
125 INJECTION, SOLUTION INTRAVENOUS CONTINUOUS
Status: DISCONTINUED | OUTPATIENT
Start: 2021-12-04 | End: 2021-12-05

## 2021-12-04 RX ORDER — MORPHINE SULFATE 2 MG/ML
2 INJECTION, SOLUTION INTRAMUSCULAR; INTRAVENOUS
Status: DISCONTINUED | OUTPATIENT
Start: 2021-12-04 | End: 2021-12-04 | Stop reason: HOSPADM

## 2021-12-04 RX ORDER — SODIUM CHLORIDE 0.9 % (FLUSH) 0.9 %
5-40 SYRINGE (ML) INJECTION AS NEEDED
Status: DISCONTINUED | OUTPATIENT
Start: 2021-12-04 | End: 2021-12-04 | Stop reason: HOSPADM

## 2021-12-04 RX ORDER — ONDANSETRON 2 MG/ML
4 INJECTION INTRAMUSCULAR; INTRAVENOUS AS NEEDED
Status: DISCONTINUED | OUTPATIENT
Start: 2021-12-04 | End: 2021-12-04 | Stop reason: HOSPADM

## 2021-12-04 RX ORDER — ROPIVACAINE HYDROCHLORIDE 5 MG/ML
30 INJECTION, SOLUTION EPIDURAL; INFILTRATION; PERINEURAL AS NEEDED
Status: DISCONTINUED | OUTPATIENT
Start: 2021-12-04 | End: 2021-12-04 | Stop reason: HOSPADM

## 2021-12-04 RX ORDER — PROPOFOL 10 MG/ML
INJECTION, EMULSION INTRAVENOUS
Status: DISCONTINUED | OUTPATIENT
Start: 2021-12-04 | End: 2021-12-04 | Stop reason: HOSPADM

## 2021-12-04 RX ORDER — ONDANSETRON 2 MG/ML
INJECTION INTRAMUSCULAR; INTRAVENOUS AS NEEDED
Status: DISCONTINUED | OUTPATIENT
Start: 2021-12-04 | End: 2021-12-04 | Stop reason: HOSPADM

## 2021-12-04 RX ORDER — AMOXICILLIN 250 MG
1 CAPSULE ORAL 2 TIMES DAILY
Status: DISCONTINUED | OUTPATIENT
Start: 2021-12-04 | End: 2021-12-11 | Stop reason: HOSPADM

## 2021-12-04 RX ORDER — BUPIVACAINE HYDROCHLORIDE 5 MG/ML
INJECTION, SOLUTION EPIDURAL; INTRACAUDAL
Status: COMPLETED | OUTPATIENT
Start: 2021-12-04 | End: 2021-12-04

## 2021-12-04 RX ORDER — MIDAZOLAM HYDROCHLORIDE 1 MG/ML
0.5 INJECTION, SOLUTION INTRAMUSCULAR; INTRAVENOUS
Status: DISCONTINUED | OUTPATIENT
Start: 2021-12-04 | End: 2021-12-04 | Stop reason: HOSPADM

## 2021-12-04 RX ORDER — SODIUM CHLORIDE 0.9 % (FLUSH) 0.9 %
5-40 SYRINGE (ML) INJECTION EVERY 8 HOURS
Status: DISCONTINUED | OUTPATIENT
Start: 2021-12-04 | End: 2021-12-11 | Stop reason: HOSPADM

## 2021-12-04 RX ORDER — FACIAL-BODY WIPES
10 EACH TOPICAL DAILY PRN
Status: DISCONTINUED | OUTPATIENT
Start: 2021-12-06 | End: 2021-12-11 | Stop reason: HOSPADM

## 2021-12-04 RX ORDER — LIDOCAINE HYDROCHLORIDE 10 MG/ML
0.1 INJECTION, SOLUTION EPIDURAL; INFILTRATION; INTRACAUDAL; PERINEURAL AS NEEDED
Status: DISCONTINUED | OUTPATIENT
Start: 2021-12-04 | End: 2021-12-04 | Stop reason: HOSPADM

## 2021-12-04 RX ORDER — FERROUS SULFATE, DRIED 160(50) MG
1 TABLET, EXTENDED RELEASE ORAL
Status: DISCONTINUED | OUTPATIENT
Start: 2021-12-05 | End: 2021-12-11 | Stop reason: HOSPADM

## 2021-12-04 RX ORDER — PROPOFOL 10 MG/ML
INJECTION, EMULSION INTRAVENOUS AS NEEDED
Status: DISCONTINUED | OUTPATIENT
Start: 2021-12-04 | End: 2021-12-04 | Stop reason: HOSPADM

## 2021-12-04 RX ORDER — FENTANYL CITRATE 50 UG/ML
25 INJECTION, SOLUTION INTRAMUSCULAR; INTRAVENOUS
Status: DISCONTINUED | OUTPATIENT
Start: 2021-12-04 | End: 2021-12-04 | Stop reason: HOSPADM

## 2021-12-04 RX ORDER — ACETAMINOPHEN 325 MG/1
650 TABLET ORAL ONCE
Status: DISCONTINUED | OUTPATIENT
Start: 2021-12-04 | End: 2021-12-04 | Stop reason: HOSPADM

## 2021-12-04 RX ADMIN — FENTANYL CITRATE 25 MCG: 0.05 INJECTION, SOLUTION INTRAMUSCULAR; INTRAVENOUS at 12:24

## 2021-12-04 RX ADMIN — ACETAMINOPHEN 650 MG: 325 TABLET ORAL at 13:14

## 2021-12-04 RX ADMIN — PROPOFOL 40 MCG/KG/MIN: 10 INJECTION, EMULSION INTRAVENOUS at 08:33

## 2021-12-04 RX ADMIN — WATER 1.5 G: 1 INJECTION INTRAMUSCULAR; INTRAVENOUS; SUBCUTANEOUS at 09:04

## 2021-12-04 RX ADMIN — BUPIVACAINE HYDROCHLORIDE 9 MG: 5 INJECTION, SOLUTION EPIDURAL; INTRACAUDAL; PERINEURAL at 08:38

## 2021-12-04 RX ADMIN — ONDANSETRON HYDROCHLORIDE 4 MG: 2 INJECTION, SOLUTION INTRAMUSCULAR; INTRAVENOUS at 09:37

## 2021-12-04 RX ADMIN — Medication 6 ML: at 22:00

## 2021-12-04 RX ADMIN — PHENYLEPHRINE HYDROCHLORIDE 30 MCG/MIN: 10 INJECTION INTRAVENOUS at 08:38

## 2021-12-04 RX ADMIN — CEFAZOLIN SODIUM 2 G: 1 INJECTION, POWDER, FOR SOLUTION INTRAMUSCULAR; INTRAVENOUS at 20:25

## 2021-12-04 RX ADMIN — FAMOTIDINE 20 MG: 20 TABLET ORAL at 21:29

## 2021-12-04 RX ADMIN — SODIUM CHLORIDE 125 ML/HR: 9 INJECTION, SOLUTION INTRAVENOUS at 12:56

## 2021-12-04 RX ADMIN — ACETAMINOPHEN 1000 MG: 500 TABLET ORAL at 21:29

## 2021-12-04 RX ADMIN — SODIUM CHLORIDE 125 ML/HR: 9 INJECTION, SOLUTION INTRAVENOUS at 19:00

## 2021-12-04 RX ADMIN — DOCUSATE SODIUM 50 MG AND SENNOSIDES 8.6 MG 1 TABLET: 8.6; 5 TABLET, FILM COATED ORAL at 21:29

## 2021-12-04 RX ADMIN — SODIUM CHLORIDE, POTASSIUM CHLORIDE, SODIUM LACTATE AND CALCIUM CHLORIDE: 600; 310; 30; 20 INJECTION, SOLUTION INTRAVENOUS at 08:18

## 2021-12-04 RX ADMIN — TRANEXAMIC ACID 1 G: 100 INJECTION, SOLUTION INTRAVENOUS at 09:01

## 2021-12-04 RX ADMIN — Medication 10 ML: at 15:00

## 2021-12-04 RX ADMIN — PROPOFOL 20 MG: 10 INJECTION, EMULSION INTRAVENOUS at 08:33

## 2021-12-04 NOTE — OP NOTES
[address]  OPERATIVE REPORT    Name:  Radha Blanc  MR#:  [id number]  :  1928  ACCOUNT #:  [de-identified]  DATE OF SERVICE:  2021      PREOPERATIVE DIAGNOSIS:  Comminuted displaced peritrochanteric fracture of the left femur. POSTOPERATIVE DIAGNOSIS:  Comminuted displaced peritrochanteric fracture of the left femur. PROCEDURE PERFORMED:  Open reduction intramedullary fixation of left peritrochanteric femur fracture. SURGEON:  Cheikh Montiero MD    ASSISTANT:  Anthony Phelan PA-C    ANESTHESIA:  Spinal with sedation. COMPLICATIONS:  None. SPECIMENS REMOVED:  None. IMPLANTS:  Components implanted, Synthes long trochanteric femoral nail. ESTIMATED BLOOD LOSS:  200 mL. INDICATIONS:  The patient is a 69-year-old female who suffered a mechanical ground-level fall resulting in displaced comminuted peritrochanteric fracture of the left femur. She presents to the operating room for surgical fixation to allow for early mobilization and to maximize functional recovery. Risks, benefits, and alternatives of procedure were reviewed with the patient and her daughter in detail and they desire to proceed. PROCEDURE:  The patient was taken to the operating room where Anesthesia team placed a spinal.  Preoperative IV antibiotics were administered. She was transferred to supine position on the fracture table. Right lower extremity was placed in boot traction with the hip slightly extended, but not externally rotated due to the presence of total hip replacement. Reduction maneuver was performed on the left hip and left lower extremity was placed in boot traction. Left hip and thigh were prepped and draped in usual sterile fashion. Through a longitudinal incision over the lateral hip, proximal to the greater trochanter, guidewire was passed through the tip of the greater trochanter. This was performed under fluoroscopic guidance.   Starter reamer was passed through the proximal segment. Guidewire was passed into the femoral canal.  Canal was reamed up to 11.5 mm for 10-mm diameter Synthes long trochanteric femoral nail. The fracture was grossly aligned and measured the femur for 360 mm length nail. Nail was inserted into the proximal segment through another lateral incision more distally. Bone hook was passed around the proximal femoral diaphysis and was used to manipulate the distal segment to allow for insertion of the nail. Nail was passed under fluoroscopic guidance to appropriate level to allow for near center pin position in the femoral head for the lag screw. Through a 130 mm proximal jig, guidewire was passed which ended up in the center of the femoral head on lateral image, just distal to the center on AP image, measured for an 85 mm spiral blade. Proximal reamer was passed and blade was inserted. The proximal jig was utilized to apply some compression to help align the medial femoral cortices on AP fluoroscopic image and the blade was locked into a static position. Two distal locking screws were placed under fluoroscopic guidance. Final AP and lateral fluoroscopic images confirmed fracture reduction and placement of all hardware. Wounds were irrigated. The proximal two wounds were closed with some heavy Vicryl sutures in the fascial layers, subcutaneous 2-0 Vicryl followed by staples in all incisions. Sterile Aquacel dressings were applied. Note that a Grant catheter had been inserted at the beginning of the procedure. The patient was transported to the postanesthesia care unit in stable condition. All counts were correct at the end of the procedure. The physician assistant was critical throughout the procedure to assist with patient positioning, retraction, and maintenance of fracture reduction during placement of hardware, as well as for closure.   There were no [employer] residents or fellows available to assist.      Cayla Chery MD [id number]  D:  12/04/2021 10:50  T:  12/04/2021 14:57  JOB #:  [id number]  CC:  MD Drew Prado MD

## 2021-12-04 NOTE — PROGRESS NOTES
TRANSFER - OUT REPORT:    Verbal report given to [address] Contreras RN(name) on Brandy Medina  being transferred to OR(unit) for ordered procedure       Report consisted of patients Situation, Background, Assessment and   Recommendations(SBAR). Information from the following report(s) SBAR, Kardex, Intake/Output, MAR and Recent Results was reviewed with the receiving nurse. Lines:   Peripheral IV 12/03/21 Right Forearm (Active)   Site Assessment Clean, dry, & intact 12/03/21 1640   Phlebitis Assessment 0 12/03/21 1640   Infiltration Assessment 0 12/03/21 1640   Dressing Status Clean, dry, & intact 12/03/21 1640   Dressing Type Transparent 12/03/21 1640   Hub Color/Line Status Flushed; Patent 12/03/21 1640   Action Taken Open ports on tubing capped 12/03/21 1640   Alcohol Cap Used Yes 12/03/21 1640        Opportunity for questions and clarification was provided.       Patient transported with:   Registered Nurse  Tech

## 2021-12-04 NOTE — PROGRESS NOTES
Physical Therapy 12/4/2021    Orders received and chart reviewed up to date. Pt POD 0 hip IMN 2/2 hip fracture. Will follow-up tomorrow for PT evaluation as appropriate. Thank you.   Lisa Alba, PT, DPT

## 2021-12-04 NOTE — BRIEF OP NOTE
Brief Postoperative Note    Patient: Brandy Medina  YOB: 1928  MRN: [id number]    Date of Procedure: 12/4/2021     Pre-Op Diagnosis: LEFT HIP FRACTURE    Post-Op Diagnosis: Same as preoperative diagnosis. Procedure(s):  LEFT HIP  intramedullary nail  -Trochanteric Fixation Nail    Surgeon(s):  Vicky Saucedo MD    Surgical Assistant: Physician Assistant: Amy Sutherland PA-C    Anesthesia: General     Estimated Blood Loss (mL): 165     Complications: None    Specimens: * No specimens in log *     Implants:   Implant Name Type Inv.  Item Serial No.  Lot No. LRB No. Used Action   NAIL IM [id number] 130DEG LNG L PROX FEM GRN TI KAREN - SNA  NAIL IM [id number] [id number] 130DEG LNG L PROX FEM GRN TI KAREN NA DEPUY [employer]_ [id number] Left 1 Implanted   TFNA FENESTRATED HELICAL BLADE   NA [employer] [id number] Left 1 Implanted   SCREW BNE L36MM DIA5MM NONSTERILE TIB LT GRN TI ST KAREN SOY - SNA  SCREW BNE L36MM DIA5MM NONSTERILE TIB LT GRN TI ST KAREN SOY NA DEPUY [employer]_WD NA Left 1 Implanted   SCREW BNE L34MM DIA5MM NONSTERILE TIB LT GRN TI ST KAREN SOY - SNA  SCREW BNE L34MM DIA5MM NONSTERILE TIB LT GRN TI ST KAREN SOY NA DEPUY [employer]_WD NA Left 1 Implanted       Drains:   [REMOVED] External Urinary Catheter 12/03/21 (Removed)   Site Assessment Clean, dry, & intact 12/04/21 0636   Repositioned Yes 12/04/21 0636   Perineal Care Yes 12/04/21 0636   Wick Changed No 12/04/21 0636   Suction Canister/Tubing Changed Yes 12/04/21 0636   Urine Output (mL) 450 ml 12/04/21 0636       Findings: left hip fx    Electronically Signed by Shelly Khan PA-C on 12/4/2021 at 11:04 AM

## 2021-12-04 NOTE — PERIOP NOTES
TRANSFER - OUT REPORT:    Verbal report given to Melvina(name) on Lauren JENKINS Butler  being transferred to [facility](unit) for routine post - op       Report consisted of patients Situation, Background, Assessment and   Recommendations(SBAR). Time Pre op antibiotic given:0904  Anesthesia Stop time: 9353  Grant Present on Transfer to floor:y  Order for Grant on Chart:y  Discharge Prescriptions with Chart:n    Information from the following report(s) SBAR, OR Summary, Intake/Output, MAR, Accordion and Recent Results was reviewed with the receiving nurse. Opportunity for questions and clarification was provided. Is the patient on 02? NO       L/Min        Other     Is the patient on a monitor? NO    Is the nurse transporting with the patient? NO    Surgical Waiting Area notified of patient's transfer from PACU?  YES      The following personal items collected during your admission accompanied patient upon transfer:   Dental Appliance: Dental Appliances: None  Vision:    Hearing Aid:    Jewelry:    Clothing:    Other Valuables:    Valuables sent to safe:

## 2021-12-04 NOTE — ANESTHESIA PREPROCEDURE EVALUATION
Relevant Problems   NEUROLOGY   (+) Mild dementia (HCC)      CARDIOVASCULAR   (+) Paroxysmal atrial fibrillation (HCC)      GASTROINTESTINAL   (+) GERD (gastroesophageal reflux disease)       Anesthetic History   No history of anesthetic complications            Review of Systems / Medical History  Patient summary reviewed, nursing notes reviewed and pertinent labs reviewed    Pulmonary  Within defined limits                 Neuro/Psych         Dementia (mild)     Cardiovascular            Dysrhythmias : atrial fibrillation      Exercise tolerance: >4 METS     GI/Hepatic/Renal     GERD           Endo/Other  Within defined limits           Other Findings   Comments: Hip fracture           Physical Exam    Airway  Mallampati: II  TM Distance: 4 - 6 cm  Neck ROM: normal range of motion   Mouth opening: Normal     Cardiovascular    Rhythm: irregular  Rate: normal    Murmur, Aortic area    Comments: Brief bedside TTE reveals no significant AS, good LVEF Dental    Dentition: Lower partial plate and Upper partial plate     Pulmonary  Breath sounds clear to auscultation               Abdominal  GI exam deferred       Other Findings   Comments: Mild agitation         Anesthetic Plan    ASA: 3  Anesthesia type: spinal          Induction: Intravenous  Anesthetic plan and risks discussed with: Patient and Son / Daughter

## 2021-12-04 NOTE — ANESTHESIA PROCEDURE NOTES
Spinal Block    Start time: 12/4/2021 8:30 AM  End time: 12/4/2021 8:38 AM  Performed by: Woodrow Luna CRNA  Authorized by: Sandra Kim MD     Pre-procedure:   Indications: at surgeon's request, post-op pain management, procedure for pain and primary anesthetic  Preanesthetic Checklist: patient identified, risks and benefits discussed, anesthesia consent, site marked, patient being monitored and timeout performed      Spinal Block:   Patient Position:  Right lateral decubitus  Prep Region:  Lumbar  Prep: DuraPrep      Location:  L3-4  Technique:  Single shot        Needle:   Needle Type:  Pencan  Needle Gauge:  25 G  Attempts:  1      Events: CSF confirmed, no blood with aspiration and no paresthesia        Assessment:  Insertion:  Uncomplicated  Patient tolerance:  Patient tolerated the procedure well with no immediate complications  Paramedian approach

## 2021-12-04 NOTE — ANESTHESIA POSTPROCEDURE EVALUATION
Post-Anesthesia Evaluation and Assessment    Patient: Mode Dean MRN: [id number]  SSN: [id number]    YOB: 1928  Age: 80 y.o. Sex: female      I have evaluated the patient and they are stable and ready for discharge from the PACU. Cardiovascular Function/Vital Signs  Visit Vitals  /89   Pulse 93   Temp 36.5 °C (97.7 °F)   Resp 16   Wt 48.4 kg (106 lb 12.8 oz)   SpO2 97%   BMI 20.86 kg/m²       Patient is status post General anesthesia for Procedure(s):  LEFT HIP  intramedullary nail  -Trochanteric Fixation Nail. Nausea/Vomiting: None    Postoperative hydration reviewed and adequate. Pain:  Pain Scale 1:  (yelling it hurts) (12/04/21 1224)  Pain Intensity 1: 0 (12/04/21 0313)   Managed    Neurological Status:   Neuro  Orientation Level: Appropriate for age (12/04/21 0803)  LUE Motor Response: Purposeful (12/04/21 0803)  LLE Motor Response: Purposeful (12/04/21 0803)  RUE Motor Response: Purposeful (12/04/21 0803)  RLE Motor Response: Purposeful (12/04/21 0803)   At baseline    Mental Status, Level of Consciousness: Alert and  oriented to person, place, and time    Pulmonary Status:   O2 Device: CO2 nasal cannula (12/04/21 1105)   Adequate oxygenation and airway patent    Complications related to anesthesia: None    Post-anesthesia assessment completed. No concerns    Signed By: Tg Muhammad MD     December 4, 2021              Procedure(s):  LEFT HIP  intramedullary nail  -Trochanteric Fixation Nail.    spinal    <BSHSIANPOST>    INITIAL Post-op Vital signs:   Vitals Value Taken Time   /89 12/04/21 1215   Temp 36.5 °C (97.7 °F) 12/04/21 1105   Pulse 85 12/04/21 1228   Resp 15 12/04/21 1228   SpO2 88 % 12/04/21 1221   Vitals shown include unvalidated device data.

## 2021-12-04 NOTE — ROUTINE PROCESS
Patient: Elizabeth Carney MRN: [id number]  SSN: [id number]   YOB: 1928  Age: 80 y.o. Sex: female     Patient is status post Procedure(s):  LEFT HIP  intramedullary nail  -Trochanteric Fixation Nail.     Surgeon(s) and Role:     * Zeferino Carr MD - Primary    Local/Dose/Irrigation: 0.9% normal saline used for irrigation                Peripheral IV 12/03/21 Right Forearm (Active)   Site Assessment Clean, dry, & intact 12/04/21 0803   Phlebitis Assessment 0 12/04/21 0803   Infiltration Assessment 0 12/04/21 0803   Dressing Status Clean, dry, & intact 12/04/21 0803   Dressing Type Transparent 12/04/21 0803   Hub Color/Line Status Infusing 12/04/21 0803   Action Taken Open ports on tubing capped 12/03/21 1640   Alcohol Cap Used Yes 12/03/21 1640                           Dressing/Packing:  Incision 12/04/21 Leg upper Left-Dressing/Treatment: Silver dressing (silver dressing x 3) (12/04/21 1030)    Splint/Cast:  ]    Other: ford

## 2021-12-04 NOTE — PROGRESS NOTES
Hospitalist Progress Note    NAME: Jarrod Gerber   :  3/7/1928   MRN:  [id number]     HPI excerpted from admission H&P:  \"80year-old female history of A. fib, breast cancer, GERD, and mild dementia presents to the emergency department today by EMS after concern for a fall yesterday.  She apparently fell in the bathroom yesterday injuring her left hip. The fall occurred while walking in the bathroom with her walker. She fell from a height of ground level. There was no blood loss. The point of impact was the left hip. The pain is present in the left hip. The pain is mild. She was not ambulatory at the scene, and caregiver helped her back to bed. she had outpatient x-ray yesterday, there are no results available but EMS reports that the patient was called earlier this morning with a positive left hip fracture.  Patient denies any other injuries, she has some mild pain at the left hip. \"    Assessment / Plan:  Left intertrochanteric femur fracture  - Ortho input/assistance appreciated. - S/P left hip intramedullary nail/trochanteric fixation nail on 21.  -Patient's daughter is adamant that the patient not receive opioid analgesics and patient is in agreement, H/O delirium with opioids. - Schedule apap 1000 mg po q8h. - Continue calcium-vitamin D 500 mg-200 units po tid.    - PT/OT    A. Fib  - HR controlled. - Patient was on no rate controlling agents or anticoagulation PTA. Chronic anemia  - No tx indicated at this time. - Transfuse for Hgb <7.0  - Monitor. GERD  - Continue famotidine 20 mg IV daily. Mild asymptomatic hyponatremia  - Continue current IVF. - Monitor. Mild hyperglycemia  - No tx indicated at this time. - Monitor with a.m. labs. Mild dementia  - Frequent re-orientation.  - Supportive care. H/O breast cancer      18.5 - 24.9 Normal weight / Body mass index is 20.86 kg/m².     Code status: Full  Prophylaxis: Lovenox  Recommended Disposition: TBD     Subjective: Chief Complaint / Reason for Physician Visit  Complains of hip pain with movement. Plan of care and pertinent events reviewed with bedside nurse. Review of Systems:  Symptom Y/N Comments  Symptom Y/N Comments   Fever/Chills N   Chest Pain N    Poor Appetite    Edema N    Cough N   Abdominal Pain N    Sputum N   Joint Pain Y    SOB/ARTIS N   Pruritis/Rash N    Nausea/vomit N   Tolerating PT/OT     Diarrhea N   Tolerating Diet     Constipation    Other       Could NOT obtain due to:      Objective:     VITALS:   Last 24hrs VS reviewed since prior progress note.  Most recent are:  Patient Vitals for the past 24 hrs:   Temp Pulse Resp BP SpO2   12/04/21 1515 98.2 °F (36.8 °C) 92 17 125/63 92 %   12/04/21 1400 98 °F (36.7 °C) 88 17 131/64 96 %   12/04/21 1315 -- 85 17 (!) 128/57 95 %   12/04/21 1300 -- 82 19 (!) 128/46 94 %   12/04/21 1245 98.3 °F (36.8 °C) 88 21 (!) 120/52 94 %   12/04/21 1240 -- 88 18 -- 96 %   12/04/21 1230 -- 92 17 (!) 130/55 --   12/04/21 1215 -- 93 16 119/89 --   12/04/21 1200 -- 82 19 (!) 150/62 97 %   12/04/21 1145 -- 88 20 (!) 147/65 --   12/04/21 1140 -- 87 14 (!) 129/90 --   12/04/21 1130 -- 81 13 -- --   12/04/21 1120 -- 77 12 (!) 140/56 --   12/04/21 1115 -- 79 15 123/66 --   12/04/21 1110 -- 81 15 (!) 127/57 --   12/04/21 1105 97.7 °F (36.5 °C) 79 15 (!) 118/58 98 %   12/04/21 1104 97.7 °F (36.5 °C) -- -- (!) 118/58 --   12/04/21 0820 -- 86 17 136/60 99 %   12/04/21 0815 -- 92 16 (!) 148/64 98 %   12/04/21 0803 98.5 °F (36.9 °C) 85 16 (!) 149/64 98 %   12/04/21 0722 98.7 °F (37.1 °C) 89 17 133/69 96 %   12/04/21 0313 98.5 °F (36.9 °C) 94 17 (!) 148/69 95 %   12/03/21 2100 98.4 °F (36.9 °C) 85 17 (!) 107/55 97 %   12/03/21 1640 98.6 °F (37 °C) 81 18 (!) 147/79 96 %       Intake/Output Summary (Last 24 hours) at 12/4/2021 1524  Last data filed at 12/4/2021 1257  Gross per 24 hour   Intake 1000 ml   Output 1825 ml   Net -825 ml        I had a face to face encounter and independently examined this patient on 12/4/2021, as outlined below:  PHYSICAL EXAM:  General: Awake and alert. Pleasantly confused. NAD. HEENT:  Normocephalic. Sclera anicteric. Mucous membranes moist.    Chest:  Resps even/unlabored with symmetrical CWE. Air entry full. Lungs CTA. No use of accessory muscles. CV:  RRR. Normal S1/S2. No M/C/R appreciated. No JVD. No peripheral edema. Cap refill < 3 sec. Peripheral pulses 2+. GI:  Abdomen soft/NT/ND. No organomegaly. No hernia. ABT X 4.    :  Voiding. Neurologic:  [place]. Face symmetrical.  Speech normal.     Psych:  Cooperative. No anxiety or agitation. Skin:  Warm and color appropriate. No rashes or jaundice. Turgor elastic. Reviewed most current lab test results and cultures  YES  Reviewed most current radiology test results   YES  Review and summation of old records today    NO  Reviewed patient's current orders and MAR    YES  PMH/SH reviewed - no change compared to H&P  ________________________________________________________________________  Care Plan discussed with:    Comments   Patient [address] [place] [address]     Consultant                        Multidiciplinary team rounds were held today with , nursing, pharmacist and clinical coordinator. Patient's plan of care was discussed; medications were reviewed and discharge planning was addressed. ________________________________________________________________________  Shonda Sebastian NP     Procedures: see electronic medical records for all procedures/Xrays and details which were not copied into this note but were reviewed prior to creation of Plan. LABS:  I reviewed today's most current labs and imaging studies.   Pertinent labs include:  Recent Labs     12/03/21  1516   WBC 9.9   HGB 8.3*   HCT 25.6*        Recent Labs     12/03/21  1516   *   K 3.8      CO2 26   *   BUN 28*   CREA 0.78   CA 8.3*   ALB 2.9*   TBILI 0.7 ALT 33       Signed: Christopher Green NP

## 2021-12-05 LAB
ANION GAP SERPL CALC-SCNC: 8 MMOL/L (ref 5–15)
BUN SERPL-MCNC: 18 MG/DL (ref 6–20)
BUN/CREAT SERPL: 25 (ref 12–20)
CALCIUM SERPL-MCNC: 7.6 MG/DL (ref 8.5–10.1)
CHLORIDE SERPL-SCNC: 109 MMOL/L (ref 97–108)
CO2 SERPL-SCNC: 22 MMOL/L (ref 21–32)
CREAT SERPL-MCNC: 0.72 MG/DL (ref 0.55–1.02)
GLUCOSE SERPL-MCNC: 98 MG/DL (ref 65–100)
HCT VFR BLD AUTO: 20.6 % (ref 35–47)
HGB BLD-MCNC: 6.6 G/DL (ref 11.5–16)
HISTORY CHECKED?,CKHIST: NORMAL
POTASSIUM SERPL-SCNC: 3.7 MMOL/L (ref 3.5–5.1)
SODIUM SERPL-SCNC: 139 MMOL/L (ref 136–145)

## 2021-12-05 PROCEDURE — 97530 THERAPEUTIC ACTIVITIES: CPT

## 2021-12-05 PROCEDURE — 97165 OT EVAL LOW COMPLEX 30 MIN: CPT

## 2021-12-05 PROCEDURE — 74011000250 HC RX REV CODE- 250: Performed by: PHYSICIAN ASSISTANT

## 2021-12-05 PROCEDURE — 74011250637 HC RX REV CODE- 250/637: Performed by: PHYSICIAN ASSISTANT

## 2021-12-05 PROCEDURE — P9016 RBC LEUKOCYTES REDUCED: HCPCS

## 2021-12-05 PROCEDURE — 36430 TRANSFUSION BLD/BLD COMPNT: CPT

## 2021-12-05 PROCEDURE — 74011250636 HC RX REV CODE- 250/636: Performed by: PHYSICIAN ASSISTANT

## 2021-12-05 PROCEDURE — 36415 COLL VENOUS BLD VENIPUNCTURE: CPT

## 2021-12-05 PROCEDURE — 65270000029 HC RM PRIVATE

## 2021-12-05 PROCEDURE — 85018 HEMOGLOBIN: CPT

## 2021-12-05 PROCEDURE — 74011250637 HC RX REV CODE- 250/637: Performed by: NURSE PRACTITIONER

## 2021-12-05 PROCEDURE — 80048 BASIC METABOLIC PNL TOTAL CA: CPT

## 2021-12-05 PROCEDURE — 97161 PT EVAL LOW COMPLEX 20 MIN: CPT

## 2021-12-05 PROCEDURE — 74011250636 HC RX REV CODE- 250/636: Performed by: NURSE PRACTITIONER

## 2021-12-05 RX ORDER — SODIUM CHLORIDE 9 MG/ML
250 INJECTION, SOLUTION INTRAVENOUS AS NEEDED
Status: DISCONTINUED | OUTPATIENT
Start: 2021-12-05 | End: 2021-12-11 | Stop reason: HOSPADM

## 2021-12-05 RX ORDER — HEPARIN SODIUM 5000 [USP'U]/ML
5000 INJECTION, SOLUTION INTRAVENOUS; SUBCUTANEOUS EVERY 8 HOURS
Status: DISCONTINUED | OUTPATIENT
Start: 2021-12-05 | End: 2021-12-09

## 2021-12-05 RX ADMIN — Medication 10 ML: at 21:50

## 2021-12-05 RX ADMIN — FAMOTIDINE 20 MG: 20 TABLET ORAL at 21:49

## 2021-12-05 RX ADMIN — DOCUSATE SODIUM 50 MG AND SENNOSIDES 8.6 MG 1 TABLET: 8.6; 5 TABLET, FILM COATED ORAL at 20:02

## 2021-12-05 RX ADMIN — SODIUM CHLORIDE 125 ML/HR: 9 INJECTION, SOLUTION INTRAVENOUS at 02:17

## 2021-12-05 RX ADMIN — HEPARIN SODIUM 5000 UNITS: 5000 INJECTION INTRAVENOUS; SUBCUTANEOUS at 21:50

## 2021-12-05 RX ADMIN — ACETAMINOPHEN 1000 MG: 500 TABLET ORAL at 06:37

## 2021-12-05 RX ADMIN — ACETAMINOPHEN 1000 MG: 500 TABLET ORAL at 15:28

## 2021-12-05 RX ADMIN — Medication 10 ML: at 12:33

## 2021-12-05 RX ADMIN — Medication 9 ML: at 06:00

## 2021-12-05 RX ADMIN — CEFAZOLIN SODIUM 2 G: 1 INJECTION, POWDER, FOR SOLUTION INTRAMUSCULAR; INTRAVENOUS at 04:21

## 2021-12-05 RX ADMIN — OYSTER SHELL CALCIUM WITH VITAMIN D 1 TABLET: 500; 200 TABLET, FILM COATED ORAL at 17:00

## 2021-12-05 RX ADMIN — ACETAMINOPHEN 1000 MG: 500 TABLET ORAL at 21:49

## 2021-12-05 RX ADMIN — HEPARIN SODIUM 5000 UNITS: 5000 INJECTION INTRAVENOUS; SUBCUTANEOUS at 15:28

## 2021-12-05 NOTE — PROGRESS NOTES
Problem: Self Care Deficits Care Plan (Adult)  Goal: *Acute Goals and Plan of Care (Insert Text)  Description:   FUNCTIONAL STATUS PRIOR TO ADMISSION: Patient was modified independent using a rollator for functional mobility. Pt has supportive family and caregiver. They assist with IADLs (cooking and cleaning). However, she does all her own ADLs. Pt ambulates 1/2 mile a day with her rollator. HOME SUPPORT: lives with family      Occupational Therapy Goals  Initiated 12/5/2021  1. Patient will perform lower body ADLs with AE minimum assistance within 4 day(s). 2.  Patient will perform upper body ADLs standing 5 mins without fatigue or LOB with SBA within 4 day(s). 3.  Patient will perform toilet transfer with minimum assistance within 4 day(s). 4.  Patient will perform all aspects of toileting with minimum assistance within 4 day(s). 5.  Patient will perform seated bathing anterior neck to thighs with minimum assistance within 4 days. Outcome: Not Met    OCCUPATIONAL THERAPY EVALUATION  Patient: Corie Chinchilla (89 y.o. female)  Date: 12/5/2021  Primary Diagnosis: Hip fracture ([address] [S72.009A]  Procedure(s) (LRB):  LEFT HIP  intramedullary nail  -Trochanteric Fixation Nail (Left) 1 Day Post-Op   Precautions: PWB       ASSESSMENT  Based on the objective data described below, the patient presents with significant pain L hip, anxiety, fear of moving, baseline dementia, and decline in functional status s/p L hip IM nailing. Pt was total A x 2 for rolling for total A hygiene 2* bladder incontinence. Pt verbalized pain with all movement. Attempted long sitting with HOB elevated but pt poorly tolerated. She is well below her ADL baseline at this time, needing setup to total A for ADLs. Recommend SNF rehab at this time. Current Level of Function Impacting Discharge (ADLs/self-care): setup to total A, baseline dementia    Functional Outcome Measure:   The patient scored Total: 5/100 on the Barthel Index outcome measure which is indicative of being dependent in basic self-care. Other factors to consider for discharge: from home, active     Patient will benefit from skilled therapy intervention to address the above noted impairments. PLAN :  Recommendations and Planned Interventions: self care training, functional mobility training, therapeutic exercise, balance training, therapeutic activities, endurance activities, patient education, home safety training, and family training/education    Frequency/Duration: Patient will be followed by occupational therapy 5 times a week to address goals. Recommendation for discharge: (in order for the patient to meet his/her long term goals)  Therapy up to 5 days/week in SNF setting    This discharge recommendation:  Has not yet been discussed the attending provider and/or case management    IF patient discharges home will need the following DME: TBD       SUBJECTIVE:   Patient stated It hurts like [id number]. And you know it hurts because I don't curse.     OBJECTIVE DATA SUMMARY:   HISTORY:   Past Medical History:   Diagnosis Date    Atrial fibrillation ([address]     Cancer ([address]     radiation and lumpectony    GERD (gastroesophageal reflux disease)     Hearing impaired     Mild dementia ([address] 2/6/2021    Paroxysmal atrial fibrillation ([address]     a.fib 11/2010,    NS 02/2021     Past Surgical History:   Procedure Laterality Date    HX ORTHOPAEDIC      right hip    IR KYPHOPLASTY LUMBAR  8/19/2019    WA BREAST SURGERY PROCEDURE UNLISTED         Expanded or extensive additional review of patient history:     Home Situation  Home Environment: Private residence  Living Alone: No  Support Systems: Child(alpa), Caregiver/Home Care Staff  Patient Expects to be Discharged to[de-identified] House  Current DME Used/Available at Home: david Traylor, [address] [facility] chair, Hospital bed, Commode, bedside  Tub or Shower Type: Shower    Hand dominance: Right    EXAMINATION OF PERFORMANCE DEFICITS:  Cognitive/Behavioral Status:        Cognition: Follows commands  Perception: Appears intact  Perseveration: No perseveration noted  Safety/Judgement: Awareness of environment    Skin: L hip bandage with slight breakthrough    Edema: L hip, ice applied post session     Hearing:       Vision/Perceptual:                           Acuity: Within Defined Limits         Range of Motion:    AROM: Generally decreased, functional (grossly decreased L hip)                         Strength:    Strength: Generally decreased, functional                Coordination:  Coordination: Generally decreased, functional  Fine Motor Skills-Upper: Left Intact; Right Intact    Gross Motor Skills-Upper: Left Intact; Right Intact    Tone & Sensation:       Sensation: Intact                      Balance:       Functional Mobility and Transfers for ADLs:  Bed Mobility:  Rolling: Total assistance; Assist x2    Transfers:       ADL Assessment:  Feeding: Setup    Oral Facial Hygiene/Grooming: Setup    Bathing: Maximum assistance    Upper Body Dressing: Moderate assistance    Lower Body Dressing: Total assistance    Toileting: Total assistance                ADL Intervention and task modifications: Total A x 2 for rolling in bed, total A for hygiene and donning brief. Cognitive Retraining  Safety/Judgement: Awareness of environment    Therapeutic Exercise:  Encouraged ankle pumps, toe wiggles. Functional Measure:    Barthel Index:  Bathin  Bladder: 0  Bowels: 0  Groomin  Dressin  Feedin  Mobility: 0  Stairs: 0  Toilet Use: 0  Transfer (Bed to Chair and Back): 0  Total: 5/100      The Barthel ADL Index: Guidelines  1. The index should be used as a record of what a patient does, not as a record of what a patient could do. 2. The main aim is to establish degree of independence from any help, physical or verbal, however minor and for whatever reason.   3. The need for supervision renders the patient not independent. 4. A patient's performance should be established using the best available evidence. Asking the patient, friends/relatives and nurses are the usual sources, but direct observation and common sense are also important. However direct testing is not needed. 5. Usually the patient's performance over the preceding 24-48 hours is important, but occasionally longer periods will be relevant. 6. Middle categories imply that the patient supplies over 50 per cent of the effort. 7. Use of aids to be independent is allowed. Score Interpretation (from [address] [id number])    Independent   60-79 Minimally independent   40-59 Partially dependent   20-39 Very dependent   <20 Totally dependent     -Leroy Becker., Barthel, DZARA. (1965). Functional evaluation: the Barthel Index. [address] [facility] ([address] (1997). The Barthel activities of daily living index: self-reporting versus actual performance in the old (> or = 75 years). Journal of [address] [place] [id number] 457), [facility], IVAN, Mohsen Fernandes., Rosie Coldcathy. (1999). Measuring the change in disability after inpatient rehabilitation; comparison of the responsiveness of the Barthel Index and Functional [place]ough Measure. Journal of Neurology, Neurosurgery, and Psychiatry, 66(4), 803-253. Radha Christensen, N.J.A, KRYSTLE Karimi, & Fatimah Ramires MAyannaA. (2004) Assessment of post-stroke quality of life in cost-effectiveness studies: The usefulness of the Barthel Index and the EuroQoL-5D.  Quality of Life Research, 15, 285-65     Occupational Therapy Evaluation Charge Determination   History Examination Decision-Making   MEDIUM Complexity : Expanded review of history including physical, cognitive and psychosocial  history  MEDIUM Complexity : 3-5 performance deficits relating to physical, cognitive , or psychosocial skils that result in activity limitations and / or participation restrictions MEDIUM Complexity : Patient may present with comorbidities that affect occupational performnce. Miniml to moderate modification of tasks or assistance (eg, physical or verbal ) with assesment(s) is necessary to enable patient to complete evaluation       Based on the above components, the patient evaluation is determined to be of the following complexity level: MEDIUM  Pain Rating:  Increased pain, anxiety L hip. Per chart, family has requested pt to not receive opioids. Activity Tolerance:   Poor    After treatment patient left in no apparent distress:    Supine in bed, Heels elevated for pressure relief, Call bell within reach, and Side rails x 3    COMMUNICATION/EDUCATION:   The patients plan of care was discussed with: Physical therapist and Registered nurse. Patient/family have participated as able in goal setting and plan of care. This patients plan of care is appropriate for delegation to MARISSA.     Thank you for this referral.  Brendan Self, OT  Time Calculation: 21 mins

## 2021-12-05 NOTE — PROGRESS NOTES
Hospitalist Progress Note    NAME: Robert Colon   :  3/7/1928   MRN:  [id number]     HPI excerpted from admission H&P:  \"80year-old female history of A. fib, breast cancer, GERD, and mild dementia presents to the emergency department today by EMS after concern for a fall yesterday.  She apparently fell in the bathroom yesterday injuring her left hip. The fall occurred while walking in the bathroom with her walker. She fell from a height of ground level. There was no blood loss. The point of impact was the left hip. The pain is present in the left hip. The pain is mild. She was not ambulatory at the scene, and caregiver helped her back to bed. she had outpatient x-ray yesterday, there are no results available but EMS reports that the patient was called earlier this morning with a positive left hip fracture.  Patient denies any other injuries, she has some mild pain at the left hip. \"    Assessment / Plan:  Left intertrochanteric femur fracture  - Ortho input/assistance appreciated. - S/P left hip intramedullary nail/trochanteric fixation nail on 21.  - Patient's daughter is adamant that the patient not receive opioid analgesics and patient is in agreement, H/O delirium with opioids. - Schedule apap 1000 mg po q8h. - Continue calcium-vitamin D 500 mg-200 units po tid.    - PT/OT. A. Fib  - HR controlled. - Patient was on no rate controlling agents or anticoagulation PTA.  - Discussed risks/benefits of anticoagulation in patient's with afib with patient and patient's daughter. Daughter does not want to consider tx for afib. Chronic anemia  Acute blood loss anemia  - Hgb down to 6.6 this a.m.  - Transfuse 1 unit PRBCs. - Repeat H/H in a.m. Pamalee Juan Diegoer GERD  - Continue famotidine 20 mg po daily. Mild dementia  - Frequent re-orientation.  - Supportive care. Mild asymptomatic hyponatremia, resolved  - Stop IVF    Mild hyperglycemia, resolved  - Monitor periodically.       H/O breast cancer      18.5 - 24.9 Normal weight / Body mass index is 20.86 kg/m². Code status: Full  Prophylaxis: Lovenox  Recommended Disposition: TBD     Subjective:     Chief Complaint / Reason for Physician Visit  Patient reports only mild hip pain. Plan of care and pertinent events reviewed with bedside nurse. Review of Systems:  Symptom Y/N Comments  Symptom Y/N Comments   Fever/Chills N   Chest Pain N    Poor Appetite N   Edema N    Cough N   Abdominal Pain N    Sputum N   Joint Pain Y    SOB/ARTIS N   Pruritis/Rash N    Nausea/vomit N   Tolerating PT/OT     Diarrhea N   Tolerating Diet Y    Constipation    Other       Could NOT obtain due to:      Objective:     VITALS:   Last 24hrs VS reviewed since prior progress note.  Most recent are:  Patient Vitals for the past 24 hrs:   Temp Pulse Resp BP SpO2   12/05/21 0914 97.4 °F (36.3 °C) 92 17 116/66 92 %   12/05/21 0800 98.4 °F (36.9 °C) 85 17 (!) 105/54 94 %   12/05/21 0728 97.7 °F (36.5 °C) 88 18 (!) 112/57 93 %   12/05/21 0713 97.8 °F (36.6 °C) 86 16 117/62 93 %   12/05/21 0655 97.7 °F (36.5 °C) 97 17 (!) 123/55 92 %   12/05/21 0214 98 °F (36.7 °C) 97 14 101/62 92 %   12/04/21 2150 99.6 °F (37.6 °C) (!) 105 19 132/64 91 %   12/04/21 1515 98.2 °F (36.8 °C) 92 17 125/63 92 %   12/04/21 1400 98 °F (36.7 °C) 88 17 131/64 96 %   12/04/21 1315 -- 85 17 (!) 128/57 95 %   12/04/21 1300 -- 82 19 (!) 128/46 94 %   12/04/21 1245 98.3 °F (36.8 °C) 88 21 (!) 120/52 94 %   12/04/21 1240 -- 88 18 -- 96 %   12/04/21 1230 -- 92 17 (!) 130/55 --   12/04/21 1215 -- 93 16 119/89 --   12/04/21 1200 -- 82 19 (!) 150/62 97 %   12/04/21 1145 -- 88 20 (!) 147/65 --   12/04/21 1140 -- 87 14 (!) 129/90 --   12/04/21 1130 -- 81 13 -- --   12/04/21 1120 -- 77 12 (!) 140/56 --   12/04/21 1115 -- 79 15 123/66 --   12/04/21 1110 -- 81 15 (!) 127/57 --   12/04/21 1105 97.7 °F (36.5 °C) 79 15 (!) 118/58 98 %   12/04/21 1104 97.7 °F (36.5 °C) -- -- (!) 118/58 --       Intake/Output Summary (Last 24 hours) at 12/5/2021 0934  Last data filed at 12/5/2021 0730  Gross per 24 hour   Intake 1000 ml   Output 1800 ml   Net -800 ml        I had a face to face encounter and independently examined this patient on 12/5/2021, as outlined below:  PHYSICAL EXAM:  General: A/A/O.  NAD. HEENT:  Normocephalic. Sclera anicteric. Mucous membranes moist.    Chest:  Resps even/unlabored with symmetrical CWE. Air entry full. Lungs CTA. No use of accessory muscles. CV:  IRRR. Normal S1/S2. No M/C/R appreciated. No JVD. No peripheral edema. Cap refill < 3 sec. Peripheral pulses 2+. GI:  Abdomen soft/NT/ND. No organomegaly. No hernia. ABT X 4.    :  Voiding. Neurologic:  [place]. Face symmetrical.  Speech normal.     Psych:  Cooperative. No anxiety or agitation. Skin:  Warm and color appropriate. No rashes or jaundice. Turgor elastic. Reviewed most current lab test results and cultures  YES  Reviewed most current radiology test results   YES  Review and summation of old records today    NO  Reviewed patient's current orders and MAR    YES  PMH/ reviewed - no change compared to H&P  ________________________________________________________________________  Care Plan discussed with:    Comments   Patient [address]     Consultant                        Multidiciplinary team rounds were held today with , nursing, pharmacist and clinical coordinator. Patient's plan of care was discussed; medications were reviewed and discharge planning was addressed. ________________________________________________________________________  Alicia Amaro NP     Procedures: see electronic medical records for all procedures/Xrays and details which were not copied into this note but were reviewed prior to creation of Plan. LABS:  I reviewed today's most current labs and imaging studies.   Pertinent labs include:  Recent Labs     12/05/21  0431 12/03/21  8116   WBC  -- 9. 9   HGB 6.6* 8.3*   HCT 20.6* 25.6*   PLT  --  173     Recent Labs     12/05/21  0425 12/03/21  1516    134*   K 3.7 3.8   * 102   CO2 22 26   GLU 98 102*   BUN 18 28*   CREA 0.72 0.78   CA 7.6* 8.3*   ALB  --  2.9*   TBILI  --  0.7   ALT  --  33       Signed: Nidhi Garcia, NP

## 2021-12-05 NOTE — PROGRESS NOTES
Occupational Therapy  12/5/2021    Chart reviewed. Referral for OT received. Pt is POD 1 L hip IM nail. Spoke with RN who reported pt is currently receiving 1 unit PRBC for HgB 6.6. Will follow.  Carlotta Newman, OT

## 2021-12-05 NOTE — PROGRESS NOTES
Orthopaedics Daily Progress Note                            Date of Surgery:  12/4/2021      Patient: Larey Opitz   YOB: 1928  Age: 80 y.o. SUBJECTIVE:   1 Day Post-Op following LEFT HIP  intramedullary nail  -Trochanteric Fixation Nail. The patient's post operative pain is controlled. No CP/SOB. No N/V. The patient's mobility will be evaluated today during PT sessions. Getting 1 unit PRBC now. OBJECTIVE:     Vital Signs:      Visit Vitals  BP (!) 105/54   Pulse 85   Temp 98.4 °F (36.9 °C)   Resp 17   Wt 106 lb 12.8 oz (48.4 kg)   SpO2 94%   BMI 20.86 kg/m²       Physical Exam:  General: A&Ox3. The patient is cooperative, and in no acute distress. Respiratory: Respirations are unlabored. Surgical site(s): dressing clean, dry  Musculoskeletal: Calves are soft, supple, and non-tender upon palpation. Motor 5/5. Neurological:  Neurovascularly intact with good dorsi and plantar flexion. Pulses symmetrical.    Laboratory Values:             Recent Results (from the past 12 hour(s))   METABOLIC PANEL, BASIC    Collection Time: 12/05/21  4:25 AM   Result Value Ref Range    Sodium 139 136 - 145 mmol/L    Potassium 3.7 3.5 - 5.1 mmol/L    Chloride 109 (H) 97 - 108 mmol/L    CO2 22 21 - 32 mmol/L    Anion gap 8 5 - 15 mmol/L    Glucose 98 65 - 100 mg/dL    BUN 18 6 - 20 MG/DL    Creatinine 0.72 0.55 - 1.02 MG/DL    BUN/Creatinine ratio 25 (H) 12 - 20      GFR est AA >60 >60 ml/min/1.73m2    GFR est non-AA >60 >60 ml/min/1.73m2    Calcium 7.6 (L) 8.5 - 10.1 MG/DL   HGB & HCT    Collection Time: 12/05/21  4:31 AM   Result Value Ref Range    HGB 6.6 (L) 11.5 - 16.0 g/dL    HCT 20.6 (L) 35.0 - 47.0 %   RBC, ALLOCATE    Collection Time: 12/05/21  6:15 AM   Result Value Ref Range    HISTORY CHECKED?  Historical check performed          PLAN:     S/P LEFT HIP  intramedullary nail  -Trochanteric Fixation Nail -Continue PWB.  -Mobilize and continue with PT/OT until discharged     Hemodynamics Hgb today is 6.6. Getting one unit of PRBC now, recheck to follow. Wound Monitor postop dressing; no postop dressing changes necessary. Reinforce PRN. Post Operative Pain Pain Control: stable, mild-to-moderate joint symptoms intermittently, reasonably well controlled by current meds. DVT Prophylaxis Continue with SCD'S, Ankle Pump Exercises. Lovenox daily     Discharge Disposition Discharge plan: per primary team and pending PT recommendations .        Signed By: Effie Santizo PA-C  December 5, 2021 9:01 AM

## 2021-12-05 NOTE — PROGRESS NOTES
Problem: Mobility Impaired (Adult and Pediatric)  Goal: *Acute Goals and Plan of Care (Insert Text)  Outcome: Progressing Towards Goal  Note: FUNCTIONAL STATUS PRIOR TO ADMISSION: Patient was modified independent using a rollator for functional mobility. HOME SUPPORT PRIOR TO ADMISSION: The patient lived alone with family and caregivers to provide assistance. Physical Therapy Goals  Initiated 12/5/2021  1. Patient will move from supine to sit and sit to supine  in bed with minimal assistance/contact guard assist within 7 day(s). 2.  Patient will transfer from bed to chair and chair to bed with minimal assistance/contact guard assist using the least restrictive device within 7 day(s). 3.  Patient will perform sit to stand with minimal assistance/contact guard assist within 7 day(s). 4.  Patient will ambulate with minimal assistance/contact guard assist for 75 feet with the least restrictive device within 7 day(s). PHYSICAL THERAPY EVALUATION  Patient: Phil Barriga (12 y.o. female)  Date: 12/5/2021  Primary Diagnosis: Hip fracture ([facility] [address] [S72.009A]  Procedure(s) (LRB):  LEFT HIP  intramedullary nail  -Trochanteric Fixation Nail (Left) 1 Day Post-Op   Precautions: fall risk PWB       ASSESSMENT  Based on the objective data described below, the patient presents with deficits in all aspects of her mobility. Patient very limited by pain at time of evaluation. Patient is total assist for bed mobility. PT attempted to have patient long sit in the bed and could not tolerate coming to full upright position due to feelings of \"pressure on left hip\". Patient was very hesitant to try sitting EOB so was deferred until tomorrow. PT educated patient and caregiver of typical progress with PT in hospital and patient left in supine with pillows for support and ice to left hip. Current Level of Function Impacting Discharge (mobility/balance): total assist for all mobility    Functional Outcome Measure:   The patient scored 0 on the Gaytan outcome measure which is indicative of high fall risk. Other factors to consider for discharge: lived at home with family and caregiver assistance, was ambulatory     Patient will benefit from skilled therapy intervention to address the above noted impairments. PLAN :  Recommendations and Planned Interventions: bed mobility training, transfer training, gait training, therapeutic exercises, neuromuscular re-education, patient and family training/education, and therapeutic activities      Frequency/Duration: Patient will be followed by physical therapy:  twice daily to address goals. Recommendation for discharge: (in order for the patient to meet his/her long term goals)  Therapy up to 5 days/week in SNF setting    This discharge recommendation:  A follow-up discussion with the attending provider and/or case management is planned    IF patient discharges home will need the following DME: to be determined (TBD)         SUBJECTIVE:   Patient stated I hope you don't think I am being contrary but this just hurts.     OBJECTIVE DATA SUMMARY:   HISTORY:    Past Medical History:   Diagnosis Date    Atrial fibrillation ([address]     Cancer ([address]     radiation and lumpectony    GERD (gastroesophageal reflux disease)     Hearing impaired     Mild dementia ([address] 2/6/2021    Paroxysmal atrial fibrillation ([address]     a.fib 11/2010,    NS 02/2021     Past Surgical History:   Procedure Laterality Date    HX ORTHOPAEDIC      right hip    IR KYPHOPLASTY LUMBAR  8/19/2019    RI BREAST SURGERY PROCEDURE UNLISTED         Personal factors and/or comorbidities impacting plan of care: Pt lived at home with family and caregiver assistance, used rollator, able to ambulate 1/2 mile each day, independent with transfers, dressing, showers, did not have to cook or clean     Home Situation  Support Systems: Caregiver/Home Care Staff, Child(alpa)    EXAMINATION/PRESENTATION/DECISION MAKING:   Critical Behavior:     Orientation Level: Oriented to person        Hearing:     Skin:    Edema:   Range Of Motion:                          Strength: Tone & Sensation:                                  Coordination:     Vision:      Functional Mobility:  Bed Mobility:  Rolling: Total assistance; Assist x2           Transfers:                             Balance:      Ambulation/Gait Training:                                                         Stairs: Therapeutic Exercises:       Functional Measure:  Hiren Gutierrez Balance= 0        Physical Therapy Evaluation Charge Determination   History Examination Presentation Decision-Making   MEDIUM  Complexity : 1-2 comorbidities / personal factors will impact the outcome/ POC  LOW Complexity : 1-2 Standardized tests and measures addressing body structure, function, activity limitation and / or participation in recreation  LOW Complexity : Stable, uncomplicated  LOW Complexity : FOTO score of       Based on the above components, the patient evaluation is determined to be of the following complexity level: LOW     Pain Rating:  Did not give number but pain was limiting factor in all mobility     Activity Tolerance:   Poor    After treatment patient left in no apparent distress:   Supine in bed, Call bell within reach, Caregiver / family present, and Side rails x 3    COMMUNICATION/EDUCATION:   The patients plan of care was discussed with: Occupational therapist and Registered nurse. Fall prevention education was provided and the patient/caregiver indicated understanding.     Thank you for this referral.  Jorge Oh, PT   Time Calculation: 22 mins

## 2021-12-06 LAB
ABO + RH BLD: NORMAL
ANION GAP SERPL CALC-SCNC: 6 MMOL/L (ref 5–15)
BLD PROD TYP BPU: NORMAL
BLOOD GROUP ANTIBODIES SERPL: NORMAL
BPU ID: NORMAL
BUN SERPL-MCNC: 22 MG/DL (ref 6–20)
BUN/CREAT SERPL: 33 (ref 12–20)
CALCIUM SERPL-MCNC: 8.2 MG/DL (ref 8.5–10.1)
CHLORIDE SERPL-SCNC: 108 MMOL/L (ref 97–108)
CO2 SERPL-SCNC: 23 MMOL/L (ref 21–32)
CREAT SERPL-MCNC: 0.67 MG/DL (ref 0.55–1.02)
CROSSMATCH RESULT,%XM: NORMAL
GLUCOSE SERPL-MCNC: 108 MG/DL (ref 65–100)
HCT VFR BLD AUTO: 25 % (ref 35–47)
HGB BLD-MCNC: 8 G/DL (ref 11.5–16)
MAGNESIUM SERPL-MCNC: 2.3 MG/DL (ref 1.6–2.4)
PHOSPHATE SERPL-MCNC: 2.4 MG/DL (ref 2.6–4.7)
POTASSIUM SERPL-SCNC: 4.2 MMOL/L (ref 3.5–5.1)
SODIUM SERPL-SCNC: 137 MMOL/L (ref 136–145)
SPECIMEN EXP DATE BLD: NORMAL
STATUS OF UNIT,%ST: NORMAL
UNIT DIVISION, %UDIV: 0

## 2021-12-06 PROCEDURE — 85018 HEMOGLOBIN: CPT

## 2021-12-06 PROCEDURE — 74011250637 HC RX REV CODE- 250/637: Performed by: NURSE PRACTITIONER

## 2021-12-06 PROCEDURE — 74011250636 HC RX REV CODE- 250/636: Performed by: NURSE PRACTITIONER

## 2021-12-06 PROCEDURE — 97530 THERAPEUTIC ACTIVITIES: CPT

## 2021-12-06 PROCEDURE — 84100 ASSAY OF PHOSPHORUS: CPT

## 2021-12-06 PROCEDURE — 36415 COLL VENOUS BLD VENIPUNCTURE: CPT

## 2021-12-06 PROCEDURE — 83735 ASSAY OF MAGNESIUM: CPT

## 2021-12-06 PROCEDURE — 97535 SELF CARE MNGMENT TRAINING: CPT

## 2021-12-06 PROCEDURE — 80048 BASIC METABOLIC PNL TOTAL CA: CPT

## 2021-12-06 PROCEDURE — 65270000029 HC RM PRIVATE

## 2021-12-06 PROCEDURE — 74011250637 HC RX REV CODE- 250/637: Performed by: PHYSICIAN ASSISTANT

## 2021-12-06 RX ORDER — SODIUM,POTASSIUM PHOSPHATES 280-250MG
2 POWDER IN PACKET (EA) ORAL 2 TIMES DAILY
Status: DISCONTINUED | OUTPATIENT
Start: 2021-12-06 | End: 2021-12-11 | Stop reason: HOSPADM

## 2021-12-06 RX ORDER — ACETAMINOPHEN 325 MG/1
650 TABLET ORAL
Status: DISCONTINUED | OUTPATIENT
Start: 2021-12-07 | End: 2021-12-11 | Stop reason: HOSPADM

## 2021-12-06 RX ADMIN — DOCUSATE SODIUM 50 MG AND SENNOSIDES 8.6 MG 1 TABLET: 8.6; 5 TABLET, FILM COATED ORAL at 17:00

## 2021-12-06 RX ADMIN — ACETAMINOPHEN 500 MG: 500 TABLET ORAL at 08:07

## 2021-12-06 RX ADMIN — HEPARIN SODIUM 5000 UNITS: 5000 INJECTION INTRAVENOUS; SUBCUTANEOUS at 14:46

## 2021-12-06 RX ADMIN — ACETAMINOPHEN 500 MG: 500 TABLET ORAL at 19:20

## 2021-12-06 RX ADMIN — ACETAMINOPHEN 500 MG: 500 TABLET ORAL at 14:46

## 2021-12-06 RX ADMIN — Medication 10 ML: at 14:47

## 2021-12-06 RX ADMIN — DOCUSATE SODIUM 50 MG AND SENNOSIDES 8.6 MG 1 TABLET: 8.6; 5 TABLET, FILM COATED ORAL at 09:48

## 2021-12-06 RX ADMIN — HEPARIN SODIUM 5000 UNITS: 5000 INJECTION INTRAVENOUS; SUBCUTANEOUS at 06:18

## 2021-12-06 RX ADMIN — Medication 10 ML: at 06:19

## 2021-12-06 RX ADMIN — HEPARIN SODIUM 5000 UNITS: 5000 INJECTION INTRAVENOUS; SUBCUTANEOUS at 23:44

## 2021-12-06 RX ADMIN — ACETAMINOPHEN 650 MG: 325 TABLET ORAL at 23:43

## 2021-12-06 RX ADMIN — ACETAMINOPHEN 500 MG: 500 TABLET ORAL at 11:17

## 2021-12-06 NOTE — PROGRESS NOTES
Hospitalist Progress Note    NAME: Jarrod Gerber   :  3/7/1928   MRN:  [id number]     HPI excerpted from admission H&P:  \"80year-old female history of A. fib, breast cancer, GERD, and mild dementia presents to the emergency department today by EMS after concern for a fall yesterday.  She apparently fell in the bathroom yesterday injuring her left hip. The fall occurred while walking in the bathroom with her walker. She fell from a height of ground level. There was no blood loss. The point of impact was the left hip. The pain is present in the left hip. The pain is mild. She was not ambulatory at the scene, and caregiver helped her back to bed. she had outpatient x-ray yesterday, there are no results available but EMS reports that the patient was called earlier this morning with a positive left hip fracture.  Patient denies any other injuries, she has some mild pain at the left hip. \"    Assessment / Plan:  Left intertrochanteric femur fracture  - Ortho input/assistance appreciated. - S/P left hip intramedullary nail/trochanteric fixation nail on 21.  - Patient's daughter is adamant that the patient not receive opioid analgesics and patient is in agreement, H/O delirium with opioids.  - Continue scheduled apap 1000 mg po q8h. - Continue calcium-vitamin D 500 mg-200 units po tid.    - PT/OT. A. Fib  - HR controlled. - Patient was on no rate controlling agents or anticoagulation PTA.  - Discussed risks/benefits of anticoagulation in patient's with afib with patient and patient's daughter. Daughter does not want to consider tx for afib. Chronic anemia  Acute blood loss anemia  - Patient transfused 1 unit PRBCs on 21 with appropriate increase in H/H.  - Monitor. GERD  - Continue famotidine 20 mg po daily. Mild dementia  - Patient pleasantly confused. - Frequent re-orientation.  - Supportive care.     Mild asymptomatic hyponatremia, resolved  - Stop IVF    Mild hyperglycemia, resolved  Hypophosphatemia   - Supplement lytes prn.  - Monitor. H/O breast cancer      18.5 - 24.9 Normal weight / Body mass index is 20.86 kg/m². Code status: Full  Prophylaxis: Lovenox  Recommended Disposition: TBD     Subjective:     Chief Complaint / Reason for Physician Visit  Patient pleasantly confused. Denies pain at time of exam.    Plan of care and pertinent events reviewed with bedside nurse. Review of Systems:  Symptom Y/N Comments  Symptom Y/N Comments   Fever/Chills N   Chest Pain N    Poor Appetite N   Edema N    Cough N   Abdominal Pain N    Sputum N   Joint Pain Y    SOB/ARTIS N   Pruritis/Rash N    Nausea/vomit N   Tolerating PT/OT     Diarrhea N   Tolerating Diet Y    Constipation    Other       Could NOT obtain due to:      Objective:     VITALS:   Last 24hrs VS reviewed since prior progress note. Most recent are:  Patient Vitals for the past 24 hrs:   Temp Pulse Resp BP SpO2   12/06/21 1151 -- 97 -- 118/68 --   12/06/21 0925 99.5 °F (37.5 °C) 99 16 (!) 92/48 92 %   12/06/21 0354 98.2 °F (36.8 °C) 91 14 120/65 92 %   12/05/21 2058 97.9 °F (36.6 °C) 88 16 109/61 94 %     No intake or output data in the 24 hours ending 12/06/21 1418     I had a face to face encounter and independently examined this patient on 12/6/2021, as outlined below:  PHYSICAL EXAM:  General: Awake and alert. Pleasantly confused. NAD. HEENT:  Normocephalic. Sclera anicteric. Mucous membranes moist.    Chest:  Resps even/unlabored with symmetrical CWE. Air entry full. Lungs CTA. No use of accessory muscles. CV:  IRRR. Normal S1/S2. No M/C/R appreciated. No JVD. No peripheral edema. Cap refill < 3 sec. Peripheral pulses 1-2+. GI:  Abdomen soft/NT/ND. No organomegaly. No hernia. ABT X 4.    :  Voiding. Neurologic:  [place]. Face symmetrical.  Speech normal.     Psych:  Cooperative. No anxiety or agitation. Skin:  Warm and color appropriate. No rashes or jaundice. Turgor elastic. Reviewed most current lab test results and cultures  YES  Reviewed most current radiology test results   YES  Review and summation of old records today    NO  Reviewed patient's current orders and MAR    YES  PMH/SH reviewed - no change compared to H&P  ________________________________________________________________________  Care Plan discussed with:    Comments   Patient [address]     Consultant                        Multidiciplinary team rounds were held today with , nursing, pharmacist and clinical coordinator. Patient's plan of care was discussed; medications were reviewed and discharge planning was addressed. ________________________________________________________________________  Susana Oneal NP     Procedures: see electronic medical records for all procedures/Xrays and details which were not copied into this note but were reviewed prior to creation of Plan. LABS:  I reviewed today's most current labs and imaging studies.   Pertinent labs include:  Recent Labs     12/06/21  0359 12/05/21  0431 12/03/21  1516   WBC  --   --  9.9   HGB 8.0* 6.6* 8.3*   HCT 25.0* 20.6* 25.6*   PLT  --   --  173     Recent Labs     12/06/21  0359 12/05/21  0425 12/03/21  1516    139 134*   K 4.2 3.7 3.8    109* 102   CO2 23 22 26   * 98 102*   BUN 22* 18 28*   CREA 0.67 0.72 0.78   CA 8.2* 7.6* 8.3*   MG 2.3  --   --    PHOS 2.4*  --   --    ALB  --   --  2.9*   TBILI  --   --  0.7   ALT  --   --  33       Signed: Susana Oneal NP

## 2021-12-06 NOTE — PROGRESS NOTES
Problem: Mobility Impaired (Adult and Pediatric)  Goal: *Acute Goals and Plan of Care (Insert Text)  12/6/2021 1412 by Annabel Davies  Outcome: Progressing Towards Goal   PHYSICAL THERAPY AFTERNOON SESSION NOTE  Patient: Ernestene Fleischer (35 y.o. female)  Date: 12/6/2021  Primary Diagnosis: Hip fracture ([facility] [address] [S72.009A]  Procedure(s) (LRB):  LEFT HIP  intramedullary nail  -Trochanteric Fixation Nail (Left) 2 Days Post-Op   Precautions: Fall , WBAT    Lauren Adams was seen for afternoon PT session. She has made some improvement this afternoon. She is tolerating sitting EOB better w/ increased WBing through Left hip and with both feet on floor. She did attempt to stand w/ Max Ax2 +RW however unable to tolerate much weight through LLE and noted knees flexed. She is unable to side step at this time and it is not safe to attempt OOB txr to chair/BSC. Continue to recommend Ernestene Fleischer will need rehab prior to discharging to prior place of residence. Morning session functional mobility:  Bed Mobility:     Supine to Sit: Maximum assistance; Total assistance; Assist x2  Sit to Supine: Maximum assistance; Total assistance; Assist x2     Transfers:  Sit to Stand: Maximum assistance; Assist x2  Stand to Sit: Maximum assistance; Assist x2                       Balance:   Sitting: Impaired;  With support  Sitting - Static: Fair (occasional) (pt offloading Left hip d/t pain; leaning on elbow w/CGA)  Sitting - Dynamic: Poor (constant support)         Thank you,  Annabel Davies,PTA   Time Calculation: 27 mins

## 2021-12-06 NOTE — PROGRESS NOTES
EDYTA: Family does not want SNF placement. Preference is home with home health. Patient has hired caregivers and family lives across the street. Patient has all needed DME. Daughter prefers a certain [facility] agency and stated that she will notify CM which agency by tomorrow. BLS transport at discharge. Chart reviewed. Noted recommendations for SNF placement. CM met with patient and caregiver at bedside. Patient is confused. CM called the daughter Piper Mason #[phone]. Daughter asked if CM can call back later as she is in a meeting. CM will call back later today. 2:00 PM: CM called the daughter Piper Mason. Daughter prefers home with [facility] rather than rehab. Patient lives in a rancher home with 1 stair to enter. Daughter stated that the patient is never left alone, she has private duty caregivers, and family is there when caregivers are not present. The daughter lives across the street. Patient owns Hospital bed, transport chair, wheelchair, walker, rollator, bedside commode. The daughter would like a certain home care agency (Not [facility] which patient has used in the past). Daughter stated that she will notify CM which agency by tomorrow. CM will continue to follow.     BLAYNE Marquez/CRM

## 2021-12-06 NOTE — PROGRESS NOTES
Problem: Mobility Impaired (Adult and Pediatric)  Goal: *Acute Goals and Plan of Care (Insert Text)  Outcome: Progressing Towards Goal   PHYSICAL THERAPY MORNING SESSION NOTE  Patient: Radha Navarro (81 y.o. female)  Date: 12/6/2021  Primary Diagnosis: Hip fracture ([facility] [address] [S72.009A]  Procedure(s) (LRB):  LEFT HIP  intramedullary nail  -Trochanteric Fixation Nail (Left) 2 Days Post-Op   Precautions: Fall, WBAT    Lauren Adams was seen for morning PT session. She is barely tolerating EOB sitting d/t pain. Pt leans to Right and on Right elbow for support (CGA-Min A)-initially Max A). The limiting factors are pain, decreased activity tolerance and mobility. Currently, leighton Navarro will need rehab prior to discharging to prior place of residence. The full therapy note will follow after this afternoon's session as able and appropriate. Morning session functional mobility:  Bed Mobility:     Supine to Sit: Maximum assistance; Total assistance; Assist x2  Sit to Supine: Maximum assistance; Total assistance; Assist x2     Transfers:                             Balance:   Sitting: Impaired;  With support  Sitting - Static: Fair (occasional) (pt offloading Left hip d/t pain; leaning on elbow w/CGA)  Sitting - Dynamic: Poor (constant support)      Thank you,  Annabel Davies,PTA   Time Calculation: 28 mins

## 2021-12-06 NOTE — PROGRESS NOTES
Orthopedic Progress Note    S: Pain well controlled at rest, no other complaints today;Denies numbness, tingling, focal weakness, cp, sob, fever, chills    O: NAD, respirations unlabored; Dressings with central staining, but edges intact; thigh swollen but soft, no edema, no posterior calf ttp; DF/PF 5/5, SILT; Cap refill brisk, foot warm, DP2+    Patient Vitals for the past 4 hrs:   Temp Pulse BP   12/06/21 0925 99.5 °F (37.5 °C) 99 (!) 92/48     Recent Labs     12/06/21  0359 12/05/21  0431 12/05/21  0425 12/03/21  1516 12/03/21  1516   HGB 8.0* 6.6*  --    < > 8.3*   HCT 25.0* 20.6*  --    < > 25.6*   PLT  --   --   --   --  173   BUN 22*  --  18   < > 28*   CREA 0.67  --  0.72   < > 0.78   K 4.2  --  3.7   < > 3.8     --  139   < > 134*    < > = values in this interval not displayed.             A/P:  Procedure: Procedure(s):  LEFT HIP  intramedullary nail  -Trochanteric Fixation Nail  Post Op day: 2 Days Post-Op    - DVT ppx - Lovenox daily; SCDs  - PT/OT - PWB  - Pain - Acetaminophen, Oxycodone prn; Ice, Elevation, Ace wrap as needed  - Dressing - Leave in place if it remains dry and intact; reinforce as needed for saturation   - Dispo - Pending PT/OT recs; needs f/u in 2-3 weeks with Dr. Megan Gupta, [place]  Orthopedic Trauma Service  [facility]

## 2021-12-06 NOTE — PROGRESS NOTES
NUTRITION  Pt seen for:     []           Supplements  [x]             PO intake check   []           Food Allergies  []             Food Preferences/tolerances    []           Rescreen   []             Education    []           Diet order clarification []             Other            RECOMMENDATIONS:     Continue regular diet as ordered    RD has ordered chocolate Boost pudding BID (pt does not like Ensure products, only Boost products)    SUBJECTIVE/OBJECTIVE:   Information obtained from:   Chart review        Diet:  regular  Supplements:  none  Intake: []           Good     [x]           Fair      []           Poor   No data found.     Weight Changes:   []            Loss   []            Gain  [x]            Stable  Wt Readings from Last 10 Encounters:   12/04/21 48.4 kg (106 lb 12.8 oz)   02/16/21 48.5 kg (107 lb)   11/19/20 52.5 kg (115 lb 11.2 oz)   09/24/20 51.7 kg (114 lb)   08/19/19 51.7 kg (114 lb)   12/27/17 49.9 kg (110 lb)   08/04/16 49.9 kg (110 lb)   03/01/16 50.8 kg (112 lb)       Nutrition Problems Identified:  [x]       None: not at nutrition risk  []           Specified food preferences   []           Dislikes supplements              []           Allergies  []           Difficulty chewing or swallowing   []           Poor dentition   []           Nausea/Vomiting  []           Constipation  []           Diarrhea    PLAN:   []           Obtained/adjusted food preferences/tolerances and/or snacks options   []           Dislikes supplements will try a substitution   []           Modify diet for food allergies  []           Adjust texture due to difficulty chewing   [x]    Continue current diet  []           Educated patient  [x]           Add Supplements  []          Rescreen      Wilbert Villar RD, CSP  Contact via [employer]

## 2021-12-06 NOTE — PROGRESS NOTES
Problem: Self Care Deficits Care Plan (Adult)  Goal: *Acute Goals and Plan of Care (Insert Text)  Description:   FUNCTIONAL STATUS PRIOR TO ADMISSION: Patient was modified independent using a rollator for functional mobility. Pt has supportive family and caregiver. They assist with IADLs (cooking and cleaning). However, she does all her own ADLs. Pt ambulates 1/2 mile a day with her rollator. HOME SUPPORT: lives with family      Occupational Therapy Goals  Initiated 12/5/2021  1. Patient will perform lower body ADLs with AE minimum assistance within 4 day(s). 2.  Patient will perform upper body ADLs standing 5 mins without fatigue or LOB with SBA within 4 day(s). 3.  Patient will perform toilet transfer with minimum assistance within 4 day(s). 4.  Patient will perform all aspects of toileting with minimum assistance within 4 day(s). 5.  Patient will perform seated bathing anterior neck to thighs with minimum assistance within 4 days. Outcome: Not Met   OCCUPATIONAL THERAPY TREATMENT  Patient: [facility] (45 y.o. female)  Date: 12/6/2021  Diagnosis: Hip fracture ([address] [place]   <principal problem not specified>  Procedure(s) (LRB):  LEFT HIP  intramedullary nail  -Trochanteric Fixation Nail (Left) 2 Days Post-Op  Precautions:  Fall  Chart, occupational therapy assessment, plan of care, and goals were reviewed. ASSESSMENT  Patient continues with skilled OT services and is progressing towards goals. Patient received in bed, agreeable to attempt therapy. Private aide present during session. Supine > sit with max-total Ax2 and she tolerated sitting sat EOB approx 3-4 mins. Initially needing max A for support then able to sit briefly with CGA-SBA with R lateral lean on R forearm resting on elevated HOB. She reports pain with attempts for WB in sitting through L hip at EOB, maintained R lateral lean offloading L hip through out time at EOB.   Returned to supine with total Ax2 and repositioned higher in bed with total A x2. Patient left with bed in modified chair position, she was unable to tolerate full chair position secondary to reports of nausea with HOB elevated. Current Level of Function Impacting Discharge (ADLs): grooming tasks at bed level set up, gown mgmt min A    Other factors to consider for discharge: Patient is below baseline in which she lives at home with assistance from an aide. At this time she is requiring x2 assist for bed mobility. PLAN :  Patient continues to benefit from skilled intervention to address the above impairments. Continue treatment per established plan of care to address goals. Recommend with staff: x2 assist for rolling every two hours, bed in chair position as tolerated for meals    Recommend next OT session: EOB with progression to standing as tolerated to progress to standing ADL tasks    Recommendation for discharge: (in order for the patient to meet his/her long term goals)  Therapy up to 5 days/week in SNF setting    This discharge recommendation:  Has been made in collaboration with the attending provider and/or case management    IF patient discharges home will need the following DME: bedside commode, shower chair, walker: rolling, and wheelchair       SUBJECTIVE:   Patient stated I wont scream but when I say 'that's enough' then I need to stop.     OBJECTIVE DATA SUMMARY:   Cognitive/Behavioral Status:     Orientation Level: Oriented X4  Cognition: Follows commands             Functional Mobility and Transfers for ADLs:  Bed Mobility:  Supine to Sit: Maximum assistance; Total assistance; Assist x2  Sit to Supine: Maximum assistance; Total assistance; Assist x2    Transfers:             Balance:  Sitting: Impaired;  With support  Sitting - Static: Fair (occasional) (pt offloading Left hip d/t pain; leaning on elbow w/CGA)  Sitting - Dynamic: Poor (constant support)    ADL Intervention:       UB Dressing:  Gown: min A with HOB moderately elevated      Pain:  Received pain medication prior to session, left with ice on L hip  Patient reports increased pain with transitional movements supine > sit, did not quantify level. Activity Tolerance:   Fair    After treatment patient left in no apparent distress:   Call bell within reach, Bed / chair alarm activated, Caregiver / family present, and modified bed in chair position    COMMUNICATION/COLLABORATION:   The patients plan of care was discussed with: Physical therapy assistant and Registered nurse.      Praveena Maya OT  Time Calculation: 28 mins

## 2021-12-07 LAB
ANION GAP SERPL CALC-SCNC: 8 MMOL/L (ref 5–15)
BASOPHILS # BLD: 0 K/UL (ref 0–0.1)
BASOPHILS NFR BLD: 0 % (ref 0–1)
BUN SERPL-MCNC: 18 MG/DL (ref 6–20)
BUN/CREAT SERPL: 35 (ref 12–20)
CALCIUM SERPL-MCNC: 8.1 MG/DL (ref 8.5–10.1)
CHLORIDE SERPL-SCNC: 106 MMOL/L (ref 97–108)
CO2 SERPL-SCNC: 24 MMOL/L (ref 21–32)
CREAT SERPL-MCNC: 0.52 MG/DL (ref 0.55–1.02)
DIFFERENTIAL METHOD BLD: ABNORMAL
EOSINOPHIL # BLD: 0.1 K/UL (ref 0–0.4)
EOSINOPHIL NFR BLD: 1 % (ref 0–7)
ERYTHROCYTE [DISTWIDTH] IN BLOOD BY AUTOMATED COUNT: 15.9 % (ref 11.5–14.5)
GLUCOSE SERPL-MCNC: 96 MG/DL (ref 65–100)
HCT VFR BLD AUTO: 27.7 % (ref 35–47)
HGB BLD-MCNC: 8.9 G/DL (ref 11.5–16)
IMM GRANULOCYTES # BLD AUTO: 0.1 K/UL (ref 0–0.04)
IMM GRANULOCYTES NFR BLD AUTO: 1 % (ref 0–0.5)
LYMPHOCYTES # BLD: 0.7 K/UL (ref 0.8–3.5)
LYMPHOCYTES NFR BLD: 10 % (ref 12–49)
MCH RBC QN AUTO: 28.1 PG (ref 26–34)
MCHC RBC AUTO-ENTMCNC: 32.1 G/DL (ref 30–36.5)
MCV RBC AUTO: 87.4 FL (ref 80–99)
MONOCYTES # BLD: 0.9 K/UL (ref 0–1)
MONOCYTES NFR BLD: 14 % (ref 5–13)
NEUTS SEG # BLD: 4.9 K/UL (ref 1.8–8)
NEUTS SEG NFR BLD: 74 % (ref 32–75)
NRBC # BLD: 0 K/UL (ref 0–0.01)
NRBC BLD-RTO: 0 PER 100 WBC
PHOSPHATE SERPL-MCNC: 2.6 MG/DL (ref 2.6–4.7)
PLATELET # BLD AUTO: 292 K/UL (ref 150–400)
PMV BLD AUTO: 10.3 FL (ref 8.9–12.9)
POTASSIUM SERPL-SCNC: 4.3 MMOL/L (ref 3.5–5.1)
RBC # BLD AUTO: 3.17 M/UL (ref 3.8–5.2)
RBC MORPH BLD: ABNORMAL
SODIUM SERPL-SCNC: 138 MMOL/L (ref 136–145)
WBC # BLD AUTO: 6.7 K/UL (ref 3.6–11)

## 2021-12-07 PROCEDURE — 80048 BASIC METABOLIC PNL TOTAL CA: CPT

## 2021-12-07 PROCEDURE — 65270000029 HC RM PRIVATE

## 2021-12-07 PROCEDURE — 97530 THERAPEUTIC ACTIVITIES: CPT

## 2021-12-07 PROCEDURE — 74011250637 HC RX REV CODE- 250/637: Performed by: NURSE PRACTITIONER

## 2021-12-07 PROCEDURE — 74011250637 HC RX REV CODE- 250/637: Performed by: PHYSICIAN ASSISTANT

## 2021-12-07 PROCEDURE — 84100 ASSAY OF PHOSPHORUS: CPT

## 2021-12-07 PROCEDURE — 85025 COMPLETE CBC W/AUTO DIFF WBC: CPT

## 2021-12-07 PROCEDURE — 36415 COLL VENOUS BLD VENIPUNCTURE: CPT

## 2021-12-07 PROCEDURE — 74011250636 HC RX REV CODE- 250/636: Performed by: NURSE PRACTITIONER

## 2021-12-07 PROCEDURE — 97535 SELF CARE MNGMENT TRAINING: CPT

## 2021-12-07 RX ORDER — FAMOTIDINE 20 MG/1
20 TABLET, FILM COATED ORAL DAILY
Status: DISCONTINUED | OUTPATIENT
Start: 2021-12-08 | End: 2021-12-11 | Stop reason: HOSPADM

## 2021-12-07 RX ADMIN — OYSTER SHELL CALCIUM WITH VITAMIN D 1 TABLET: 500; 200 TABLET, FILM COATED ORAL at 10:03

## 2021-12-07 RX ADMIN — ACETAMINOPHEN 650 MG: 325 TABLET ORAL at 15:41

## 2021-12-07 RX ADMIN — Medication 10 ML: at 14:00

## 2021-12-07 RX ADMIN — ACETAMINOPHEN 650 MG: 325 TABLET ORAL at 22:32

## 2021-12-07 RX ADMIN — ACETAMINOPHEN 650 MG: 325 TABLET ORAL at 11:26

## 2021-12-07 RX ADMIN — HEPARIN SODIUM 5000 UNITS: 5000 INJECTION INTRAVENOUS; SUBCUTANEOUS at 23:45

## 2021-12-07 RX ADMIN — DOCUSATE SODIUM 50 MG AND SENNOSIDES 8.6 MG 1 TABLET: 8.6; 5 TABLET, FILM COATED ORAL at 10:02

## 2021-12-07 RX ADMIN — HEPARIN SODIUM 5000 UNITS: 5000 INJECTION INTRAVENOUS; SUBCUTANEOUS at 18:14

## 2021-12-07 RX ADMIN — POTASSIUM & SODIUM PHOSPHATES POWDER PACK 280-160-250 MG 2 PACKET: 280-160-250 PACK at 09:00

## 2021-12-07 RX ADMIN — HEPARIN SODIUM 5000 UNITS: 5000 INJECTION INTRAVENOUS; SUBCUTANEOUS at 10:02

## 2021-12-07 NOTE — PROGRESS NOTES
Orthopedic Progress Note    S: Pain well controlled at rest, has no new complaints; Denies numbness, tingling, focal weakness, cp, sob, fever, chills    O: NAD, respirations unlabored; Dressings with central staining, but edges intact; thigh swollen but soft, no edema, no posterior calf ttp; DF/PF 5/5, SILT; Cap refill brisk, foot warm,    No data found. Recent Labs     12/07/21  0630 12/06/21  0359   HGB 8.9* 8.0*   HCT 27.7* 25.0*     --    BUN 18 22*   CREA 0.52* 0.67   K 4.3 4.2    137         A/P:  Procedure: Procedure(s):  LEFT HIP  intramedullary nail  -Trochanteric Fixation Nail  Post Op day: 3 Days Post-Op    - DVT ppx - recommend Lovenox daily, currently receiving Heparin, defer to hospital on regimen taking into consideration other medical issues; needs at least 4 weeks of ppx from date of surgery; SCDs  - PT/OT - PWB LLE; if pt will be non-mobile due to this restriction she may place some weight on the leg per Dr. Sarath Pal  - Pain - Acetaminophen, Oxycodone prn; Ice, Elevation, Ace wrap as needed  - Dressing - Leave in place if it remains dry and intact; reinforce as needed for saturation   - Dispo - Pending PT/OT recs; needs f/u in 2-3 weeks with Dr. Sarath Pal; refer to DC instructions for further details.  Will sign off, call with questions    QUINTIN Ureña  Orthopedic Trauma Service  [facility]

## 2021-12-07 NOTE — PROGRESS NOTES
EDYTA: Family does not want SNF placement. Preference is home with home health. CM waiting on daughter to provide [place] choice (Daughter prefers a certain [place] agency and stated that she will notify CM which agency by the end of the day today). Patient has hired caregivers and family lives across the street. Patient has all needed DME to include a Hospital bed, transport chair, wheelchair, walker, rollator, bedside commode. BLS transport at discharge. Chart reviewed. CM spoke with hospitalist. The patient is medically stable for discharge. CM met with patient and daughter Rajan Licea at bedside. Daughter stated that patient has not even been able to stand yet with therapy, and daughter  feels that the patient will not be ready for discharge until Thursday at the earliest. CM will notify hospitalist.    Daughter stated that she will notify CM which home health agency she prefers by the end of the day today. CM met with patient's daughter along with hospitalist NP. Daughter is still not in agreement with the discharge today. CM provided the daughter with medicare letter which informs patient of their right to appeal the discharge. Medicare pt has received, reviewed, and signed 2nd  letter informing them of their right to appeal the discharge.       BLAYNE Bronson/ROYA

## 2021-12-07 NOTE — PROGRESS NOTES
Hospitalist Progress Note    NAME: Stef Vidales   :  3/7/1928   MRN:  [id number]     HPI excerpted from admission H&P:  \"80year-old female history of A. fib, breast cancer, GERD, and mild dementia presents to the emergency department today by EMS after concern for a fall yesterday.  She apparently fell in the bathroom yesterday injuring her left hip. The fall occurred while walking in the bathroom with her walker. She fell from a height of ground level. There was no blood loss. The point of impact was the left hip. The pain is present in the left hip. The pain is mild. She was not ambulatory at the scene, and caregiver helped her back to bed. she had outpatient x-ray yesterday, there are no results available but EMS reports that the patient was called earlier this morning with a positive left hip fracture.  Patient denies any other injuries, she has some mild pain at the left hip. \"    Assessment / Plan:  Left intertrochanteric femur fracture  - Ortho input/assistance appreciated. - S/P left hip intramedullary nail/trochanteric fixation nail on 21.  - Patient's daughter is adamant that the patient not receive opioid analgesics and patient is in agreement, H/O delirium with opioids.  - Continue scheduled apap 1000 mg po q8h. - Continue calcium-vitamin D 500 mg-200 units po tid.    - PT/OT, patient's daughter did not allow PT to work with patient today when they were available. A. Fib  - HR controlled. - Patient was on no rate controlling agents or anticoagulation PTA.  - Discussed risks/benefits of anticoagulation in patient's with afib with patient and patient's daughter. Daughter does not want to consider tx for afib. Chronic anemia  Acute blood loss anemia  - H/H stable since transfusion.  - Patient transfused 1 unit PRBCs on 21 with appropriate increase in H/H.  - Monitor periodically. GERD  - Continue famotidine 20 mg po daily.       Mild dementia  - Patient pleasantly confused. - Frequent re-orientation.  - Supportive care. Mild asymptomatic hyponatremia, resolved  - Monitor periodically    Mild hyperglycemia, resolved  Hypophosphatemia, resolved   - Supplement lytes prn.  - Monitor periodically. H/O breast cancer    Disp  - PT has recommended that the patient received rehab prior to returning to residence however, patient's daughter refuses consideration of rehab in an inpatient setting other than the hospital. I have discussed with the patient's daughter that her mother is medically stable for discharge. I have explained that we are doing nothing medically in the inpatient setting that cannot be done and a lower level of care such as SNF. She was offered the opportunity to consider SNF again and declined. As a result, a discharge order will be placed. In concert with the  I have outlined the process for appeal of discharge with the patient's daughter. Appeal paperwork was provided by the . The daughter was instructed that she would need to file an appeal during this calendar day. 18.5 - 24.9 Normal weight / Body mass index is 22.39 kg/m². Code status: Full  Prophylaxis: Heparin  Recommended Disposition: TBD     Subjective:     Chief Complaint / Reason for Physician Visit  Patient pleasantly confused. Denies pain at time of exam.  Sitting in chair. Plan of care and pertinent events reviewed with bedside nurse. Review of Systems:  Symptom Y/N Comments  Symptom Y/N Comments   Fever/Chills N   Chest Pain N    Poor Appetite N   Edema N    Cough N   Abdominal Pain N    Sputum N   Joint Pain Y    SOB/ARTIS N   Pruritis/Rash N    Nausea/vomit N   Tolerating PT/OT     Diarrhea N   Tolerating Diet Y    Constipation    Other       Could NOT obtain due to:      Objective:     VITALS:   Last 24hrs VS reviewed since prior progress note.  Most recent are:  Patient Vitals for the past 24 hrs:   Temp Pulse Resp BP SpO2   12/07/21 0824 98.1 °F (36.7 °C) 95 17 (!) 151/71 92 %   12/07/21 0718 97.7 °F (36.5 °C) 98 16 (!) 154/74 93 %   12/06/21 2220 97.8 °F (36.6 °C) 89 18 130/69 94 %   12/06/21 1600 98.4 °F (36.9 °C) 92 16 124/73 95 %     No intake or output data in the 24 hours ending 12/07/21 1536     I had a face to face encounter and independently examined this patient on 12/7/2021, as outlined below:  PHYSICAL EXAM:  General: Awake and alert. Pleasantly confused. Sitting up in bedside chair. NAD. HEENT:  Normocephalic. Sclera anicteric. Mucous membranes moist.    Chest:  Resps even/unlabored with symmetrical CWE. Air entry full. Lungs CTA. No use of accessory muscles. CV:  IRRR. Normal S1/S2. No M/C/R appreciated. No JVD. No peripheral edema. Cap refill < 3 sec. Peripheral pulses 1-2+. GI:  Abdomen soft/NT/ND. No organomegaly. No hernia. ABT X 4.    :  Voiding. Neurologic:  Andreafski. Face symmetrical.  Speech normal.     Psych:  Cooperative. No anxiety or agitation. Skin:  Warm and color appropriate. No rashes or jaundice. Turgor elastic. Reviewed most current lab test results and cultures  YES  Reviewed most current radiology test results   YES  Review and summation of old records today    NO  Reviewed patient's current orders and MAR    YES  PMH/SH reviewed - no change compared to H&P  ________________________________________________________________________  Care Plan discussed with:    Comments   Patient [address] [place]     Consultant                        Multidiciplinary team rounds were held today with , nursing, pharmacist and clinical coordinator. Patient's plan of care was discussed; medications were reviewed and discharge planning was addressed.      ________________________________________________________________________  Magdaleno Thomas NP     Procedures: see electronic medical records for all procedures/Xrays and details which were not copied into this note but were reviewed prior to creation of Plan. LABS:  I reviewed today's most current labs and imaging studies.   Pertinent labs include:  Recent Labs     12/07/21 0630 12/06/21 0359 12/05/21  0431   WBC 6.7  --   --    HGB 8.9* 8.0* 6.6*   HCT 27.7* 25.0* 20.6*     --   --      Recent Labs     12/07/21 0630 12/06/21 0359 12/05/21  0425    137 139   K 4.3 4.2 3.7    108 109*   CO2 24 23 22   GLU 96 108* 98   BUN 18 22* 18   CREA 0.52* 0.67 0.72   CA 8.1* 8.2* 7.6*   MG  --  2.3  --    PHOS 2.6 2.4*  --        Signed: Cynthea Shone, NP

## 2021-12-07 NOTE — PROGRESS NOTES
Bedside and Verbal shift change report given to Chante Barrera RN (oncoming nurse) by Mateo Rojas RN (offgoing nurse). Report included the following information SBAR and Kardex.

## 2021-12-07 NOTE — PROGRESS NOTES
Pt's daughter present and requested waiting until 2pm for OT and PT today due to fatigue and pain control. Will attempt back later today as pain is controlled and able.     Aditya Gonsalez MS OTR/L

## 2021-12-07 NOTE — PROGRESS NOTES
Problem: Mobility Impaired (Adult and Pediatric)  Goal: *Acute Goals and Plan of Care (Insert Text)  Outcome: Progressing Towards Goal     PHYSICAL THERAPY TREATMENT  Patient: Elian Gallo (15 y.o. female)  Date: 12/7/2021  Diagnosis: Hip fracture ([facility] [address] Anitha Dove   <principal problem not specified>  Procedure(s) (LRB):  LEFT HIP  intramedullary nail  -Trochanteric Fixation Nail (Left) 3 Days Post-Op  Precautions: Fall, PWB  Chart, physical therapy assessment, plan of care and goals were reviewed. ASSESSMENT  Patient continues with skilled PT services and is progressing towards goals however requires heavy assist x2 to do so (Max Ax2). She was received in the chair and transferred via small side steps to sit EOB w/ use of RW (Mod-Max Ax2). She requires mod-max verbal cues to keep hips extended for upright posture and to utilize BUE for support through RW (pt's daughter reports pt has good UB strength). Once seated EOB, daughter requesting that pt sit on [facility] prior to returning to supine and asking if she and caregiver and complete transfer utilizing a dependent transfer method. Informed pt's daughter that particular method would not be safe nor would it conducive to pt's progression. Pt completed transfer to [facility] however required increased assist at hips for extension and for guidance over to [facility]. Pt returned to bed w/ all items in reach/needs met. Pt and pt daughter prefer pt to return home. Would recommend (heavy) assist x2 at all times (24/7) as well as HHPT/OT to increase and aide w/ pt's progression. Pt has required DME for home use. Current Level of Function Impacting Discharge (mobility/balance): Max Ax2     Other factors to consider for discharge: PWB (LLE). mobility below baseline. Requires Assist x2 to complete simple in/OOb tasks at bedside. PLAN :  Patient continues to benefit from skilled intervention to address the above impairments.   Continue treatment per established plan of care.  to address goals. Recommendation for discharge: (in order for the patient to meet his/her long term goals)  Therapy up to 5 days/week in SNF setting. If pt to d/c home, will require assistx2 for transfers and functional mobility w/ HHPT and increased supervision. This discharge recommendation:  Has been made in collaboration with the attending provider and/or case management    IF patient discharges home will need the following DME: patient owns DME required for discharge       SUBJECTIVE:   Patient stated I thank you for all you all do.     OBJECTIVE DATA SUMMARY:   Critical Behavior:  Neurologic State: Alert  Orientation Level: Oriented X4  Cognition: Follows commands, Memory loss  Safety/Judgement: Awareness of environment  Functional Mobility Training:  Bed Mobility:  Rolling: Total assistance  Supine to Sit:  (received OOB in chair)  Sit to Supine: Maximum assistance; Assist x2           Transfers:  Sit to Stand: Maximum assistance; Assist x2  Stand to Sit: Maximum assistance; Assist x2        Bed to Chair: Maximum assistance; Assist x2 (chair>bed>BSC<>bed)                    Balance:  Sitting: Impaired; High guard; With support  Sitting - Static: Fair (occasional)  Sitting - Dynamic: Poor (constant support)  Standing: Impaired; With support  Standing - Static: Constant support  Standing - Dynamic : Constant support; Poor    Pain Rating: Moderate    Activity Tolerance:   Fair    After treatment patient left in no apparent distress:   Supine in bed, Call bell within reach, Caregiver / family present, and Side rails x 3    COMMUNICATION/COLLABORATION:   The patients plan of care was discussed with: Registered nurse.      Annabel Davies PTA   Time Calculation: 45 mins

## 2021-12-07 NOTE — PROGRESS NOTES
Problem: Self Care Deficits Care Plan (Adult)  Goal: *Acute Goals and Plan of Care (Insert Text)  Description:   FUNCTIONAL STATUS PRIOR TO ADMISSION: Patient was modified independent using a rollator for functional mobility. Pt has supportive family and caregiver. They assist with IADLs (cooking and cleaning). However, she does all her own ADLs. Pt ambulates 1/2 mile a day with her rollator. HOME SUPPORT: lives with family      Occupational Therapy Goals  Initiated 12/5/2021  1. Patient will perform lower body ADLs with AE minimum assistance within 4 day(s). 2.  Patient will perform upper body ADLs standing 5 mins without fatigue or LOB with SBA within 4 day(s). 3.  Patient will perform toilet transfer with minimum assistance within 4 day(s). 4.  Patient will perform all aspects of toileting with minimum assistance within 4 day(s). 5.  Patient will perform seated bathing anterior neck to thighs with minimum assistance within 4 days. Outcome: Progressing Towards Goal    OCCUPATIONAL THERAPY TREATMENT  Patient: Fabiana Trejo (6 y.o. female)  Date: 12/7/2021  Diagnosis: Hip fracture ([address] Martín Salamanca   <principal problem not specified>  Procedure(s) (LRB):  LEFT HIP  intramedullary nail  -Trochanteric Fixation Nail (Left) 3 Days Post-Op  Precautions:    Chart, occupational therapy assessment, plan of care, and goals were reviewed. ASSESSMENT  Patient continues with skilled OT services and is progressing towards goals. Pt is AAOx4, improved tolerance for pain today to complete 2 person SPT to bed and BSC. She has decreased motor planning and benefits from clear cues, bilateral assistance to maintain upright posture and RW for UE to help offset pain in RLE. The patient is able to tolerate more today and family/caregiver present for education on safety with transfers and DME recommendations. Pt is max A to total A for all LB ADLs, anticipate set/up to mod A for grooming and UB dressing seated. The pt's family is reluctant to SNF placement. IF she is to return home she will required a 2 person assist for all transfers, [facility], hospital bed, RW, and HHOT to attempt to progress the patient back to her baseline of more independence. [facility] OT will continue to follow while in hospital.      Current Level of Function Impacting Discharge (ADLs): total A    Other factors to consider for discharge: PLOF, assistance at home         PLAN :  Patient continues to benefit from skilled intervention to address the above impairments. Continue treatment per established plan of care to address goals. Recommend with staff: Cezar Alberto for meals    Recommend next OT session: follow POC    Recommendation for discharge: (in order for the patient to meet his/her long term goals)  Occupational therapy at least 2 days/week in the home AND ensure assist and/or supervision for safety with all transfers and self-care. Strong 2 person assist for anything out of bed    This discharge recommendation:  Has been made in collaboration with the attending provider and/or case management    IF patient discharges home will need the following DME: has all needed       SUBJECTIVE:   Patient stated what do you all want me to do now? - pleasant     OBJECTIVE DATA SUMMARY:   Cognitive/Behavioral Status:  Neurologic State: Alert  Orientation Level: Oriented X4  Cognition: Follows commands; Memory loss             Functional Mobility and Transfers for ADLs:  Bed Mobility:  Rolling: Total assistance    Transfers:     Functional Transfers  Toilet Transfer : Total assistance (Mod to Max A x2)  Adaptive Equipment: Jah Patrick (comment)       Balance:   Standing: Poor, 2 person constant assistance    ADL Intervention:       Lower Body Dressing Assistance  Dressing Assistance: Total assistance(dependent)    Toileting  Toileting Assistance: Total assistance(dependent)  Bladder Hygiene:  Total assistance (dependent)  Clothing Management: Total assistance (dependent)    Cognitive Retraining  Orientation Retraining: Situation  Problem Solving: Identifying the task    Therapeutic Exercises:   Scooting in Chair: min A and a lot of encouragement  Chair to stand: mod A to max A x2 with RW  SPT: mod A to max A x2 with complete assistance to manage RW and strong cues for taking steps and directing movement    Pain:  Does not rate but stated \"bad\", due for pain medication at 15:30    Activity Tolerance:   Fair and requires frequent rest breaks    After treatment patient left in no apparent distress:   Supine in bed, Heels elevated for pressure relief, Call bell within reach, Caregiver / family present, and Side rails x 3    COMMUNICATION/COLLABORATION:   The patients plan of care was discussed with: Physical therapist, Registered nurse, and Case management.      Ruben Coffman  Time Calculation: 45 mins

## 2021-12-07 NOTE — PROGRESS NOTES
Physical Therapy  12/7/2021    Chart reviewed. Attempted to see pt this at this time, however pt's daughter reports pt just got up to chair w/ NSG assist. Daughter also reports plan was to have therapies come around next does of pain meds (2pm) and that pt does better in the afternoon. Will check back at later time this afternoon as able/appropriate for mobility training/assessment.        Annabel Means, PTA

## 2021-12-08 PROCEDURE — 74011250636 HC RX REV CODE- 250/636: Performed by: NURSE PRACTITIONER

## 2021-12-08 PROCEDURE — 74011250637 HC RX REV CODE- 250/637: Performed by: PHYSICIAN ASSISTANT

## 2021-12-08 PROCEDURE — 74011250637 HC RX REV CODE- 250/637: Performed by: NURSE PRACTITIONER

## 2021-12-08 PROCEDURE — 97535 SELF CARE MNGMENT TRAINING: CPT

## 2021-12-08 PROCEDURE — 65270000029 HC RM PRIVATE

## 2021-12-08 PROCEDURE — 97530 THERAPEUTIC ACTIVITIES: CPT

## 2021-12-08 RX ORDER — GUAIFENESIN 600 MG/1
600 TABLET, EXTENDED RELEASE ORAL 2 TIMES DAILY
Status: DISCONTINUED | OUTPATIENT
Start: 2021-12-08 | End: 2021-12-08

## 2021-12-08 RX ADMIN — Medication 10 ML: at 21:37

## 2021-12-08 RX ADMIN — ACETAMINOPHEN 650 MG: 325 TABLET ORAL at 21:37

## 2021-12-08 RX ADMIN — DOCUSATE SODIUM 50 MG AND SENNOSIDES 8.6 MG 1 TABLET: 8.6; 5 TABLET, FILM COATED ORAL at 09:25

## 2021-12-08 RX ADMIN — Medication 10 ML: at 07:24

## 2021-12-08 RX ADMIN — Medication 10 ML: at 14:00

## 2021-12-08 RX ADMIN — HEPARIN SODIUM 5000 UNITS: 5000 INJECTION INTRAVENOUS; SUBCUTANEOUS at 07:23

## 2021-12-08 RX ADMIN — ACETAMINOPHEN 650 MG: 325 TABLET ORAL at 12:28

## 2021-12-08 RX ADMIN — ACETAMINOPHEN 650 MG: 325 TABLET ORAL at 07:23

## 2021-12-08 RX ADMIN — FAMOTIDINE 20 MG: 20 TABLET, FILM COATED ORAL at 07:23

## 2021-12-08 RX ADMIN — ACETAMINOPHEN 650 MG: 325 TABLET ORAL at 16:40

## 2021-12-08 NOTE — PROGRESS NOTES
Bedside shift change report given to Kilo Valdes (oncoming nurse) by Pinky Allan (offgoing nurse). Report included the following information SBAR, Kardex, OR Summary, Procedure Summary, Intake/Output and MAR.

## 2021-12-08 NOTE — PROGRESS NOTES
Occupational Therapy Note:     Coordinated with care giver for nursing to provide medication prior to therapy and for therapy to follow up with patient around 1 pm today. Discussed with PTA and she is in agreement. Will follow up later.        Bib Reid, OT

## 2021-12-08 NOTE — PROGRESS NOTES
Problem: Self Care Deficits Care Plan (Adult)  Goal: *Acute Goals and Plan of Care (Insert Text)  Description:   FUNCTIONAL STATUS PRIOR TO ADMISSION: Patient was modified independent using a rollator for functional mobility. Pt has supportive family and caregiver. They assist with IADLs (cooking and cleaning). However, she does all her own ADLs. Pt ambulates 1/2 mile a day with her rollator. HOME SUPPORT: lives with family      Occupational Therapy Goals  Initiated 12/5/2021  1. Patient will perform lower body ADLs with AE minimum assistance within 4 day(s). 2.  Patient will perform upper body ADLs standing 5 mins without fatigue or LOB with SBA within 4 day(s). 3.  Patient will perform toilet transfer with minimum assistance within 4 day(s). 4.  Patient will perform all aspects of toileting with minimum assistance within 4 day(s). 5.  Patient will perform seated bathing anterior neck to thighs with minimum assistance within 4 days. Outcome: Progressing Towards Goal very slowly     OCCUPATIONAL THERAPY TREATMENT  Patient: Robert Colon (59 y.o. female)  Date: 12/8/2021  Diagnosis: Hip fracture ([facility] [address] [S72.009A]   <principal problem not specified>  Procedure(s) (LRB):  LEFT HIP  intramedullary nail  -Trochanteric Fixation Nail (Left) 4 Days Post-Op  Precautions:    Chart, occupational therapy assessment, plan of care, and goals were reviewed. ASSESSMENT  Patient continues with skilled OT services and is progressing towards goals. Pt received sitting up in chair following lunch. She was able to finish 90% of her plate. Encouraged pt to work on transfers to the [facility] but she declined reporting she was unable to due to pain in her hip and groin. She is only taking tylenol for pain due to h/o delirium with narcotics per chart. Care giver present and encouraging to the patient.   She was able to stand with max A x 2 persons and did well with standing but unable to advance her feet or progress to the [facility] or bed. Due to reflux concerns, caregiver requesting she remain OOB in chair as she will vomit if she lays down. Continue to agree with discharge to rehab setting to maximize safety and independence with functional mobility and self care tasks. Per notes, family refusing rehab and planning to discharge home. Pt is awaiting discharge appeal at this time. If patient will discharge home, she will require assist x 2 persons with all functional transfers and ADL activities. Family has all needed DME at home. She will also require ambulate transport home. Current Level of Function Impacting Discharge (ADLs): max A x 2 persons    Other factors to consider for discharge: debility, pain         PLAN :  Patient continues to benefit from skilled intervention to address the above impairments. Continue treatment per established plan of care to address goals. Recommend with staff: OOB to chair as tolerated    Recommend next OT session: BSC, toileting    Recommendation for discharge: (in order for the patient to meet his/her long term goals)  Therapy up to 5 days/week in SNF setting    This discharge recommendation:  Has been made in collaboration with the attending provider and/or case management    IF patient discharges home will need the following DME: none       SUBJECTIVE:   Patient stated I really do not think I can do any more than this.     OBJECTIVE DATA SUMMARY:   Cognitive/Behavioral Status:  Neurologic State: Alert  Orientation Level: Oriented to person; Oriented to place; Oriented to situation  Cognition: Follows commands  Perception: Appears intact  Perseveration: No perseveration noted  Safety/Judgement: Awareness of environment    Functional Mobility and Transfers for ADLs:  Bed Mobility:  Supine to Sit:  (recieved OOB in chair)  Sit to Supine:  (remained OOB in chair)  Scooting:  Additional time; Minimum assistance    Transfers:  Sit to Stand: Maximum assistance; Assist x2     Bed to Chair:  (remained OOB in chair)    Balance:  Sitting: Impaired  Sitting - Static: Fair (occasional)  Sitting - Dynamic: Poor (constant support)  Standing: Impaired; With support  Standing - Static: Constant support; Poor  Standing - Dynamic : Constant support; Poor    ADL Intervention:  Feeding  Feeding Assistance:  (needs additional time to complete feeding tasks)     Encouraged BSC tranfers but she refused stating it was just too much. Pt was able to progress with standing with max A x 2 persons. She was able to complete 2 small side steps only  and then returned back to sitting in the chair. Remained sitting up in recliner with LE's elevated and positioned with her soup from lunch. Toileting  Toileting Assistance:  (attempted toilet transfer but aborted BSC )    Cognitive Retraining  Safety/Judgement: Awareness of environment      Pain:  Moderate pain in hip and groin    Activity Tolerance:   Fair    After treatment patient left in no apparent distress:   Sitting in chair, Call bell within reach, Bed / chair alarm activated, and Caregiver / family present    COMMUNICATION/COLLABORATION:   The patients plan of care was discussed with: Physical therapy assistant and Registered nurse.      Alexandra Lane OT  Time Calculation: 30 mins

## 2021-12-08 NOTE — PROGRESS NOTES
Hospitalist Progress Note    NAME: Mode Dean   :  3/7/1928   MRN:  [id number]     HPI excerpted from admission H&P:  \"80year-old female history of A. fib, breast cancer, GERD, and mild dementia presents to the emergency department today by EMS after concern for a fall yesterday.  She apparently fell in the bathroom yesterday injuring her left hip. The fall occurred while walking in the bathroom with her walker. She fell from a height of ground level. There was no blood loss. The point of impact was the left hip. The pain is present in the left hip. The pain is mild. She was not ambulatory at the scene, and caregiver helped her back to bed. she had outpatient x-ray yesterday, there are no results available but EMS reports that the patient was called earlier this morning with a positive left hip fracture.  Patient denies any other injuries, she has some mild pain at the left hip. \"    Assessment / Plan:  Left intertrochanteric femur fracture  - Ortho input/assistance appreciated. - S/P left hip intramedullary nail/trochanteric fixation nail on 21.  - Patient's daughter is adamant that the patient not receive opioid analgesics and patient is in agreement, H/O delirium with opioids.  - Continue scheduled apap 1000 mg po q8h. - Continue calcium-vitamin D 500 mg-200 units po tid.    - PT/OT. A. Fib  - HR controlled. - Patient was on no rate controlling agents or anticoagulation PTA.  - Discussed risks/benefits of anticoagulation in patient's with afib with patient and patient's daughter. Daughter does not want to consider tx for afib. Chronic anemia  Acute blood loss anemia  - Patient transfused 1 unit PRBCs on 21 with appropriate increase in H/H.  - H/H stable since transfusion.  - Monitor periodically. GERD  - Continue famotidine 20 mg po daily. Mild dementia  - Patient pleasantly confused at times. - Frequent re-orientation.  - Supportive care.     Mild asymptomatic hyponatremia, resolved  - Monitor periodically    Mild hyperglycemia, resolved  Hypophosphatemia, resolved   - Supplement lytes prn.  - Monitor periodically. H/O breast cancer    Disp  - PT has recommended that the patient received rehab prior to returning to residence however, patient's daughter refuses consideration of rehab in an inpatient setting other than the hospital. I have discussed with the patient's daughter that her mother is medically stable for discharge. I have explained that we are doing nothing medically in the inpatient setting that cannot be done and a lower level of care such as SNF. She was offered the opportunity to consider SNF again and declined. As a result, a discharge order was  Placed. In concert with the  I have outlined the process for appeal of discharge with the patient's daughter. Appeal paperwork was provided by the . The daughter was instructed that she would need to file an appeal.        18.5 - 24.9 Normal weight / Body mass index is 22.39 kg/m². Code status: Full  Prophylaxis: Heparin  Recommended Disposition: TBD     Subjective:     Chief Complaint / Reason for Physician Visit  Patient less confused this a.mAyanna Spaulding Denies pain at time of exam.      Plan of care and pertinent events reviewed with bedside nurse. Review of Systems:  Symptom Y/N Comments  Symptom Y/N Comments   Fever/Chills N   Chest Pain N    Poor Appetite N   Edema N    Cough N   Abdominal Pain N    Sputum N   Joint Pain Y    SOB/ARTIS N   Pruritis/Rash N    Nausea/vomit N   Tolerating PT/OT     Diarrhea N   Tolerating Diet Y    Constipation    Other       Could NOT obtain due to:      Objective:     VITALS:   Last 24hrs VS reviewed since prior progress note.  Most recent are:  Patient Vitals for the past 24 hrs:   Temp Pulse Resp BP SpO2   12/08/21 0856 98.7 °F (37.1 °C) 90 18 118/60 94 %   12/08/21 0317 97.5 °F (36.4 °C) 84 18 (!) 133/56 93 %   12/07/21 1952 98.2 °F (36.8 °C) 90 17 122/65 93 %   12/07/21 1617 98.6 °F (37 °C) 80 16 113/67 96 %     No intake or output data in the 24 hours ending 12/08/21 0935     I had a face to face encounter and independently examined this patient on 12/8/2021, as outlined below:  PHYSICAL EXAM:  General: Awake and alert. Less confused this a.m.  NAD. HEENT:  Normocephalic. Sclera anicteric. Mucous membranes moist.    Chest:  Resps even/unlabored with symmetrical CWE. Air entry full. Lungs CTA. No use of accessory muscles. CV:  IRRR. Normal S1/S2. No M/C/R appreciated. No JVD. No peripheral edema. Cap refill < 3 sec. Peripheral pulses 1-2+. GI:  Abdomen soft/NT/ND. No organomegaly. No hernia. ABT X 4.    :  Voiding. Neurologic:  [place]. Face symmetrical.  Speech normal.     Psych:  Cooperative. No anxiety or agitation. Skin:  Warm and color appropriate. No rashes or jaundice. Turgor elastic. Reviewed most current lab test results and cultures  YES  Reviewed most current radiology test results   YES  Review and summation of old records today    NO  Reviewed patient's current orders and MAR    YES  PMH/SH reviewed - no change compared to H&P  ________________________________________________________________________  Care Plan discussed with:    Comments   Patient [address]     Consultant                        Multidiciplinary team rounds were held today with , nursing, pharmacist and clinical coordinator. Patient's plan of care was discussed; medications were reviewed and discharge planning was addressed. ________________________________________________________________________  Shondazoe Sebasitan NP     Procedures: see electronic medical records for all procedures/Xrays and details which were not copied into this note but were reviewed prior to creation of Plan. LABS:  I reviewed today's most current labs and imaging studies.   Pertinent labs include:  Recent Labs 12/07/21 0630 12/06/21 0359   WBC 6.7  --    HGB 8.9* 8.0*   HCT 27.7* 25.0*     --      Recent Labs     12/07/21 0630 12/06/21 0359    137   K 4.3 4.2    108   CO2 24 23   GLU 96 108*   BUN 18 22*   CREA 0.52* 0.67   CA 8.1* 8.2*   MG  --  2.3   PHOS 2.6 2.4*       Signed: Pb Acharya, NP

## 2021-12-08 NOTE — PROGRESS NOTES
Physician Progress Note      Jimena Goncalves  CSN #:                  [id number]  :                       3/7/1928  ADMIT DATE:       12/3/2021 1:10 PM  [address] [place] DATE:  RESPONDING  PROVIDER #:        Ras Larson NP          QUERY TEXT:    Pt admitted with peritrochanteric fracture of the left femur. Pt noted to be osteoporotic, on Os-Zachairah+D3 supplements and postmenopausal. If possible, please document in progress notes and discharge summary if you are evaluating and/or treating any of the following: The medical record reflects the following:  Risk Factors: 81 yo post menopausal female; osteopenia, pepcid    Clinical Indicators:    XR L Femur- The patient is osteoporotic. Treatment: Calcium-vit D3 500mg-200u PO BID    Thank you,  Dominick Milian RN, BSN, [facility], [place], [place]  Clinical Documentation  [phone] or [phone]  Options provided:  -- Pathological left femur fracture due to osteopenia  -- Pathological left femur fracture due to osteopenia following fall which would not usually break a normal, healthy bone  -- Osteoporotic left femur fracture  -- Osteoporotic left femur fracture following fall which would not usually break a normal, healthy bone  -- Traumatic left femur fracture  -- Other - I will add my own diagnosis  -- Disagree - Not applicable / Not valid  -- Disagree - Clinically unable to determine / Unknown  -- Refer to Clinical Documentation Reviewer    PROVIDER RESPONSE TEXT:    This patient has a traumatic left femur fracture.     Query created by: Aisha Spatz on 2021 12:04 PM      Electronically signed by:  Ras Larson NP 2021 2:33 PM

## 2021-12-08 NOTE — PROGRESS NOTES
Problem: Mobility Impaired (Adult and Pediatric)  Goal: *Acute Goals and Plan of Care (Insert Text)  Outcome: Progressing Towards Goal   Note: FUNCTIONAL STATUS PRIOR TO ADMISSION: Patient was modified independent using a rollator for functional mobility. HOME SUPPORT PRIOR TO ADMISSION: The patient lived alone with family and caregivers to provide assistance. Physical Therapy Goals  Initiated 12/5/2021  1. Patient will move from supine to sit and sit to supine  in bed with minimal assistance/contact guard assist within 7 day(s). 2.  Patient will transfer from bed to chair and chair to bed with minimal assistance/contact guard assist using the least restrictive device within 7 day(s). 3.  Patient will perform sit to stand with minimal assistance/contact guard assist within 7 day(s). 4.  Patient will ambulate with minimal assistance/contact guard assist for 75 feet with the least restrictive device within 7 day(s). PHYSICAL THERAPY TREATMENT  Patient: Mode Dean (47 y.o. female)  Date: 12/8/2021  Diagnosis: Hip fracture ([facility] [address] [S72.009A]   <principal problem not specified>  Procedure(s) (LRB):  LEFT HIP  intramedullary nail  -Trochanteric Fixation Nail (Left) 4 Days Post-Op  Precautions:    Chart, physical therapy assessment, plan of care and goals were reviewed. ASSESSMENT  Patient continues with skilled PT services. She is only able to tolerate stand x1 w/ Max Ax2. Unable to tolerate much WBng, although PWBing,  and side stepping; demonstrates  minimal scooting of Left foot. Pt returned to sit. Ice pack placed on left thigh/groin to aide w/ pain. Encouraged pt to keep on for at least 15-20 minutes or until \"too cold' for her to tolerate. Current Level of Function Impacting Discharge (mobility/balance): Max Ax2    Other factors to consider for discharge: assist x2. Lives at home. Mobility below baseline w/ RW. Has caregiver.           PLAN :  Patient continues to benefit from skilled intervention to address the above impairments. Continue treatment per established plan of care. to address goals. Recommendation for discharge: (in order for the patient to meet his/her long term goals)  SNF (Rehab). However pt and pt's daughter prefer daughter to go home. Continue to recommend HHPT/OT w/ increased,  assist for mobility and transfers and ADL's. This discharge recommendation:  Has been made in collaboration with the attending provider and/or case management    IF patient discharges home will need the following DME: mechanical lift and patient owns DME required for discharge       SUBJECTIVE:   Patient stated I don't think I can do much more.  d/t pain in LLE/ thigh and groin    OBJECTIVE DATA SUMMARY:   Critical Behavior:  Neurologic State: Alert  Orientation Level: Oriented to person, Oriented to place, Oriented to situation  Cognition: Follows commands  Safety/Judgement: Awareness of environment  Functional Mobility Training:  Bed Mobility:     Supine to Sit:  (recieved OOB in chair)  Sit to Supine:  (remained OOB in chair)  Scooting: Additional time; Minimum assistance        Transfers:  Sit to Stand: Maximum assistance; Assist x2  Stand to Sit: Maximum assistance; Assist x2        Bed to Chair:  (remained OOB in chair)                    Balance:  Sitting: Impaired  Sitting - Static: Fair (occasional)  Sitting - Dynamic: Poor (constant support)  Standing: Impaired; With support  Standing - Static: Constant support; Poor  Standing - Dynamic : Constant support; Poor    Pain Ratin-7/10. Pain med (Tylenol on board)    Activity Tolerance:   Fair    After treatment patient left in no apparent distress:   Sitting in chair, Call bell within reach, and Caregiver / family present    COMMUNICATION/COLLABORATION:   The patients plan of care was discussed with: Registered nurse.      Annabel Davies,PTA   Time Calculation: 22 mins

## 2021-12-08 NOTE — PROGRESS NOTES
Bedside shift [facility] [facility] ([facility] nurse[facility] by [facility] Report included the following information SBAR, Kardex, ED Summary, OR Summary, Procedure Summary, Intake/Output and MAR.

## 2021-12-09 PROCEDURE — 74011250636 HC RX REV CODE- 250/636: Performed by: NURSE PRACTITIONER

## 2021-12-09 PROCEDURE — 74011250637 HC RX REV CODE- 250/637: Performed by: PHYSICIAN ASSISTANT

## 2021-12-09 PROCEDURE — 65270000029 HC RM PRIVATE

## 2021-12-09 PROCEDURE — 97530 THERAPEUTIC ACTIVITIES: CPT

## 2021-12-09 PROCEDURE — 74011250637 HC RX REV CODE- 250/637: Performed by: NURSE PRACTITIONER

## 2021-12-09 RX ORDER — ENOXAPARIN SODIUM 100 MG/ML
30 INJECTION SUBCUTANEOUS EVERY 24 HOURS
Status: DISCONTINUED | OUTPATIENT
Start: 2021-12-09 | End: 2021-12-11 | Stop reason: HOSPADM

## 2021-12-09 RX ORDER — ENOXAPARIN SODIUM 100 MG/ML
40 INJECTION SUBCUTANEOUS EVERY 24 HOURS
Status: DISCONTINUED | OUTPATIENT
Start: 2021-12-09 | End: 2021-12-09

## 2021-12-09 RX ORDER — HYDRALAZINE HYDROCHLORIDE 20 MG/ML
10 INJECTION INTRAMUSCULAR; INTRAVENOUS
Status: DISCONTINUED | OUTPATIENT
Start: 2021-12-09 | End: 2021-12-11 | Stop reason: HOSPADM

## 2021-12-09 RX ADMIN — Medication 8 ML: at 06:00

## 2021-12-09 RX ADMIN — HEPARIN SODIUM 5000 UNITS: 5000 INJECTION INTRAVENOUS; SUBCUTANEOUS at 00:17

## 2021-12-09 RX ADMIN — ACETAMINOPHEN 650 MG: 325 TABLET ORAL at 18:53

## 2021-12-09 RX ADMIN — ACETAMINOPHEN 650 MG: 325 TABLET ORAL at 04:23

## 2021-12-09 RX ADMIN — Medication 10 ML: at 14:00

## 2021-12-09 RX ADMIN — ACETAMINOPHEN 650 MG: 325 TABLET ORAL at 14:15

## 2021-12-09 RX ADMIN — DOCUSATE SODIUM 50 MG AND SENNOSIDES 8.6 MG 1 TABLET: 8.6; 5 TABLET, FILM COATED ORAL at 10:48

## 2021-12-09 RX ADMIN — DOCUSATE SODIUM 50 MG AND SENNOSIDES 8.6 MG 1 TABLET: 8.6; 5 TABLET, FILM COATED ORAL at 18:53

## 2021-12-09 RX ADMIN — ENOXAPARIN SODIUM 30 MG: 100 INJECTION SUBCUTANEOUS at 18:53

## 2021-12-09 NOTE — PROGRESS NOTES
[address] [facility] Adult  Hospitalist Group                                                                                          Hospitalist Progress Note  Rachel Ohara NP  Answering service: [phone] OR [phone] from in house phone        Date of Service:  2021  NAME:  Smita Sequeira  :  3/7/1928  MRN:  [id number]      Admission Summary:   Per the H&P,\"80year-old female history of A. fib, breast cancer, GERD, and mild dementia presents to the emergency department today by EMS after concern for a fall yesterday.  She apparently fell in the bathroom yesterday injuring her left hip. The fall occurred while walking in the bathroom with her walker. She fell from a height of ground level. There was no blood loss. The point of impact was the left hip. The pain is present in the left hip. The pain is mild. She was not ambulatory at the scene, and caregiver helped her back to bed.  she had outpatient x-ray yesterday, there are no results available but EMS reports that the patient was called earlier this morning with a positive left hip fracture.  Patient denies any other injuries, she has some mild pain at the left hip. \"    Interval history / Subjective:   I saw the patient this morning on rounds. No complaints the moment of the encounter, daughter was at the bedside at the moment of the encounter. Assessment & Plan:     Left intertrochanteric femur fracture  S/p fall, traumatic fracture. Postop day 5, TFN. Pain currently controlled  PT/OT following, partial weightbearing to left lower extremity  To follow-up with Dr. Arjun Downing in 2 to 3 weeks  Orthopedics have signed off        Atrial fibrillation  Heart rate currently controlled  Not on anticoagulation  Continuing to monitor    GERD  Stable  Continuing famotidine    Acute blood loss anemia  Did receive 1 unit of packed red cells on 2021.     Likely due to femoral fracture/surgery  Hemoglobin remained stable  Continuing to monitor    Mild dementia  High risk for delirium  Reorient as needed    Electrolyte imbalance  Have mild hyponatremia which has since resolved  Phosphorus was also low, this has resolved with repletion      Code status: Full    DVT prophylaxis: Has been on unfractionated heparin for prophylaxis. Creatinine is normal, I did discuss with pharmacy, we will switch to low molecular weight heparin as she will be on this at discharge    Care Plan discussed with: Patient/Family, Nurse and      Anticipated Disposition:  PT, OT, RN     Anticipated Discharge: Patient has actually been discharged. Is medically stable however family member, daughter, is appealing the discharge. Physical therapy has recommended SNF however family declines this service. Pending results of the discharge appeal, discharged home with home health. Hospital Problems  Date Reviewed: 2/9/2021          Codes Class Noted POA    Hip fracture [facility]) ICD-10-CM: S74.020L  ICD-9-CM: 820.8  11/14/2020 Unknown                Review of Systems:   A comprehensive review of systems was negative except for that written in the HPI. Vital Signs:    Last 24hrs VS reviewed since prior progress note. Most recent are:  Visit Vitals  /65 (BP 1 Location: Right upper arm)   Pulse 85   Temp 98.1 °F (36.7 °C)   Resp 16   Wt 52 kg (114 lb 10.2 oz)   SpO2 96%   BMI 22.39 kg/m²       No intake or output data in the 24 hours ending 12/09/21 1356     Physical Examination:     I had a face to face encounter with this patient and independently examined them on 12/9/2021 as outlined below:          Constitutional:  No acute distress, cooperative, pleasant    ENT:  Oral mucosa moist, oropharynx benign. Resp:  CTA bilaterally. No wheezing/rhonchi/rales. No accessory muscle use   CV:  Regular rhythm, normal rate, no murmurs, gallops, rubs    GI:  Soft, non distended, non tender.  normoactive bowel sounds, no hepatosplenomegaly     Musculoskeletal:  No edema, warm, 2+ pulses throughout dressings left lower extremity dry and intact. Left lower extremity, able to wiggle toes, toes warm with brisk capillary refill    Neurologic:  Moves all extremities. AAOx3, CN II-XII reviewed            Data Review:    Review and/or order of clinical lab test  Review and/or order of tests in the radiology section of [facility]      Labs:     Recent Labs     12/07/21  0630   WBC 6.7   HGB 8.9*   HCT 27.7*        Recent Labs     12/07/21  0630      K 4.3      CO2 24   BUN 18   CREA 0.52*   GLU 96   CA 8.1*   PHOS 2.6     No results for input(s): ALT, AP, TBIL, TBILI, TP, ALB, GLOB, GGT, AML, LPSE in the last 72 hours. No lab exists for component: SGOT, GPT, AMYP, HLPSE  No results for input(s): INR, PTP, APTT, INREXT in the last 72 hours. No results for input(s): FE, TIBC, PSAT, FERR in the last 72 hours. No results found for: FOL, RBCF   No results for input(s): PH, PCO2, PO2 in the last 72 hours. No results for input(s): CPK, CKNDX, TROIQ in the last 72 hours.     No lab exists for component: CPKMB  No results found for: CHOL, CHOLX, CHLST, CHOLV, HDL, HDLP, LDL, LDLC, DLDLP, TGLX, TRIGL, TRIGP, CHHD, CHHDX  No results found for: [facility]  Lab Results   Component Value Date/Time    Color YELLOW/STRAW 02/06/2021 03:55 PM    Appearance CLEAR 02/06/2021 03:55 PM    Specific gravity 1.030 02/06/2021 03:55 PM    Specific gravity 1.023 11/14/2020 05:29 AM    pH (UA) 5.0 02/06/2021 03:55 PM    Protein TRACE (A) 02/06/2021 03:55 PM    Glucose Negative 02/06/2021 03:55 PM    Ketone 80 (A) 02/06/2021 03:55 PM    Bilirubin Negative 02/06/2021 03:55 PM    Urobilinogen 0.2 02/06/2021 03:55 PM    Nitrites Negative 02/06/2021 03:55 PM    Leukocyte Esterase Negative 02/06/2021 03:55 PM    Epithelial cells FEW 02/06/2021 03:55 PM    Bacteria Negative 02/06/2021 03:55 PM    WBC 0-4 02/06/2021 03:55 PM    RBC 5-10 02/06/2021 03:55 PM         Medications Reviewed:     Current Facility-Administered Medications   Medication Dose Route Frequency    hydrALAZINE (APRESOLINE) 20 mg/mL injection 10 mg  10 mg IntraVENous Q6H PRN    famotidine (PEPCID) tablet 20 mg  20 mg Oral DAILY    potassium, sodium phosphates (NEUTRA-PHOS) packet 2 Packet  2 Packet Oral BID    acetaminophen (TYLENOL) tablet 650 mg  650 mg Oral Q4H PRN    0.9% sodium chloride infusion 250 mL  250 mL IntraVENous PRN    heparin (porcine) injection 5,000 Units  5,000 Units SubCUTAneous Q8H    sodium chloride (NS) flush 5-40 mL  5-40 mL IntraVENous Q8H    sodium chloride (NS) flush 5-40 mL  5-40 mL IntraVENous PRN    naloxone (NARCAN) injection 0.4 mg  0.4 mg IntraVENous PRN    calcium-vitamin D (OS-CARLOS +D3) 500 mg-200 unit per tablet 1 Tablet  1 Tablet Oral TID WITH MEALS    senna-docusate (PERICOLACE) 8.6-50 mg per tablet 1 Tablet  1 Tablet Oral BID    polyethylene glycol (MIRALAX) packet 17 g  17 g Oral DAILY    bisacodyL (DULCOLAX) suppository 10 mg  10 mg Rectal DAILY PRN    ondansetron (ZOFRAN) injection 4 mg  4 mg IntraVENous Q6H PRN     ______________________________________________________________________  EXPECTED LENGTH OF STAY: 4d 7h  ACTUAL LENGTH OF STAY:          6                 Alfonso Rubinstein, NP

## 2021-12-09 NOTE — PROGRESS NOTES
EDYTA: Family has initiated discharge appeal.     Plan will be for discharge home with [facility]. Family does not want SNF placement. [facility] has accepted patient for home PT and skilled nursing. Patient has hired caregivers and family lives across the street. Patient has all needed DME to include a Hospital bed, transport chair, wheelchair, walker, rollator, bedside commode. BLS transport at discharge.       CM met with patient and daughter Chriss Billings at bedside. Daughter stated that she still has not called [employer] to do the discharge appeal. CM assisted daughter in calling [employer] and was connected with a representative with to initiate discharge appeal.     Daughter has filed the appeal, confirmation #[id number]. Care management will continue to follow.      BLAYNE Kincaid/CRM

## 2021-12-09 NOTE — PROGRESS NOTES
Lovenox Monitoring  Indication: DVT Prophylaxis  Recent Labs     12/07/21  0630   HGB 8.9*      CREA 0.52*     Current Weight:  52 kg  Est. CrCl = ~35 ml/min  Current Dose: 40 mg subcutaneously every 24 hours.   Plan: Change to 30 mg q24h    Sharee Tellez, PharmD

## 2021-12-09 NOTE — PROGRESS NOTES
Physical Therapy  12/9/2021    Chart reviewed. Attempted to see pt early this afternoon, however pt had just been transferred to chair after use of [facility] w/ caregiver and daughter. Caregiver and daughter report that pt had sat on [facility] for a half hour for BM. Pt tired at this time and w/ pain in groin and LLE, and requesting to rest. Will f/u at later time this afternoon as able and appropriate. Annabel  Means.  PTA

## 2021-12-09 NOTE — PROGRESS NOTES
Problem: Self Care Deficits Care Plan (Adult)  Goal: *Acute Goals and Plan of Care (Insert Text)  Description:   FUNCTIONAL STATUS PRIOR TO ADMISSION: Patient was modified independent using a rollator for functional mobility. Pt has supportive family and caregiver. They assist with IADLs (cooking and cleaning). However, she does all her own ADLs. Pt ambulates 1/2 mile a day with her rollator. HOME SUPPORT: lives with family      Occupational Therapy Goals  Initiated 12/5/2021  1. Patient will perform lower body ADLs with AE minimum assistance within 4 day(s). 2.  Patient will perform upper body ADLs standing 5 mins without fatigue or LOB with SBA within 4 day(s). 3.  Patient will perform toilet transfer with minimum assistance within 4 day(s). 4.  Patient will perform all aspects of toileting with minimum assistance within 4 day(s). 5.  Patient will perform seated bathing anterior neck to thighs with minimum assistance within 4 days. Outcome: Progressing Towards Goal     Occupational Therapy Note:     Pt received sitting in recliner with care giver and daughter present. Caregiver reporting she just transferred from the commode to the chair and patient was able to stand using the RW without physical assistance. Caregiver reports pt was able to take small steps to turn to the [facility]. Pt declined to engaged in any further mobility training at this time due to fatigue following toileting. PTA and OT assisted with repositioning with patient in the chair for improved comfort. She needs max A x 2 to scoot back in the chair. Daughter asking if therapy will come back but unclear at this time if time will allow. Will attempt as able. Will continue to follow for continued intervention and training. Continue to recommend assist x 2 persons for all functional transfers and ADL tasks.         Katelynn Pearson OT  Time Calculation: 10 mins

## 2021-12-10 PROCEDURE — 65270000029 HC RM PRIVATE

## 2021-12-10 PROCEDURE — 51798 US URINE CAPACITY MEASURE: CPT

## 2021-12-10 PROCEDURE — 97530 THERAPEUTIC ACTIVITIES: CPT

## 2021-12-10 PROCEDURE — 77030019905 HC CATH URETH INTMIT MDII -A

## 2021-12-10 PROCEDURE — 74011250637 HC RX REV CODE- 250/637: Performed by: NURSE PRACTITIONER

## 2021-12-10 PROCEDURE — 74011250637 HC RX REV CODE- 250/637: Performed by: PHYSICIAN ASSISTANT

## 2021-12-10 PROCEDURE — 74011250636 HC RX REV CODE- 250/636: Performed by: NURSE PRACTITIONER

## 2021-12-10 PROCEDURE — 97535 SELF CARE MNGMENT TRAINING: CPT

## 2021-12-10 RX ORDER — ENOXAPARIN SODIUM 100 MG/ML
30 INJECTION SUBCUTANEOUS EVERY 24 HOURS
Qty: 6.3 ML | Refills: 0 | Status: SHIPPED | OUTPATIENT
Start: 2021-12-10 | End: 2021-12-15 | Stop reason: ALTCHOICE

## 2021-12-10 RX ADMIN — ACETAMINOPHEN 650 MG: 325 TABLET ORAL at 09:27

## 2021-12-10 RX ADMIN — Medication 10 ML: at 05:03

## 2021-12-10 RX ADMIN — DOCUSATE SODIUM 50 MG AND SENNOSIDES 8.6 MG 1 TABLET: 8.6; 5 TABLET, FILM COATED ORAL at 09:26

## 2021-12-10 RX ADMIN — ACETAMINOPHEN 650 MG: 325 TABLET ORAL at 23:35

## 2021-12-10 RX ADMIN — ACETAMINOPHEN 650 MG: 325 TABLET ORAL at 15:06

## 2021-12-10 RX ADMIN — ENOXAPARIN SODIUM 30 MG: 100 INJECTION SUBCUTANEOUS at 18:58

## 2021-12-10 RX ADMIN — Medication 10 ML: at 14:00

## 2021-12-10 RX ADMIN — ACETAMINOPHEN 650 MG: 325 TABLET ORAL at 05:02

## 2021-12-10 RX ADMIN — ACETAMINOPHEN 650 MG: 325 TABLET ORAL at 00:24

## 2021-12-10 RX ADMIN — DOCUSATE SODIUM 50 MG AND SENNOSIDES 8.6 MG 1 TABLET: 8.6; 5 TABLET, FILM COATED ORAL at 18:58

## 2021-12-10 NOTE — PROGRESS NOTES
Problem: Mobility Impaired (Adult and Pediatric)  Goal: *Acute Goals and Plan of Care (Insert Text)  12/10/2021 1739 by Annabel Davies  Note:    Note: FUNCTIONAL STATUS PRIOR TO ADMISSION: Patient was modified independent using a rollator for functional mobility. HOME SUPPORT PRIOR TO ADMISSION: The patient lived alone with family and caregivers to provide assistance. Physical Therapy Goals  Initiated 12/5/2021  1. Patient will move from supine to sit and sit to supine  in bed with minimal assistance/contact guard assist within 7 day(s). 2.  Patient will transfer from bed to chair and chair to bed with minimal assistance/contact guard assist using the least restrictive device within 7 day(s). 3.  Patient will perform sit to stand with minimal assistance/contact guard assist within 7 day(s). 4.  Patient will ambulate with minimal assistance/contact guard assist for 75 feet with the least restrictive device within 7 day(s). 12/10/2021 1738 by MeansAnnabel  Outcome: Progressing Towards Goal  PHYSICAL THERAPY TREATMENT  Patient: [facility] (54 y.o. female)  Date: 12/10/2021  Diagnosis: Hip fracture ([facility] [address] [S72.009A]   <principal problem not specified>  Procedure(s) (LRB):  LEFT HIP  intramedullary nail  -Trochanteric Fixation Nail (Left) 6 Days Post-Op  Precautions:    Chart, physical therapy assessment, plan of care and goals were reviewed. ASSESSMENT  Patient continues with skilled PT services and is progressing towards goals. Pt received in mid-transfer from [facility] to [facility] w/ RW and PCT and Caregiver Assist. Pt returned to chair and rested but agreeable to work w/ PT/OT. She completed sit<>stand and short pre-gait activity. Required assist from RW management and demos very small, shuffled steps. Pt unable to tolerate further or additional gait training.      Current Level of Function Impacting Discharge (mobility/balance): CGA/Min Ax2 + additional time to stand from chair utilizing pull to stand on RW. Pt did attempt to take steps fwd and back (x1-2 Ft) w/ Min- Mod Ax2 for support and RW assist. Returned to chair w/ Min A           PLAN :  Patient continues to benefit from skilled intervention to address the above impairments. Continue treatment per established plan of care. to address goals. Recommendation for discharge: (in order for the patient to meet his/her long term goals)  Physical therapy at least 2 days/week in the home AND ensure assist and/or supervision for safety with mobility, transfers, ADL's. This discharge recommendation:  Has been made in collaboration with the attending provider and/or case management    IF patient discharges home will need the following DME: patient owns DME required for discharge       SUBJECTIVE:   Patient stated Well, I can certainly try. Linden Jain    OBJECTIVE DATA SUMMARY:   Critical Behavior:  Neurologic State: Alert  Orientation Level: Oriented to person, Oriented to place  Cognition: Follows commands  Safety/Judgement: Decreased awareness of environment, Decreased awareness of need for assistance, Decreased awareness of need for safety  Functional Mobility Training:  Bed Mobility:     Supine to Sit:  (received OOB in chair)  Sit to Supine:  (remained OOB in chair)           Transfers:  Sit to Stand: Contact guard assistance; Minimum assistance; Additional time; Assist x2  Stand to Sit: Minimum assistance; Assist x2                             Balance:  Sitting: Impaired  Sitting - Static: Fair (occasional)  Sitting - Dynamic: Poor (constant support)  Standing: Impaired; With support  Standing - Static: Constant support; Fair  Standing - Dynamic : Constant support  Ambulation/Gait Training:  Distance (ft):  (1-2 Ft)  Assistive Device: Gait belt; Walker, rolling  Ambulation - Level of Assistance: Minimal assistance; Moderate assistance; Assist x2        Gait Abnormalities: Antalgic; Decreased step clearance; Scissoring;  Step to gait        Base of Support: Widened     Speed/Shweta: Pace decreased (<100 feet/min)  Step Length: Left shortened; Right shortened      Pain Rating:  Reports mild-moderate pain in groin    Activity Tolerance:   Fair    After treatment patient left in no apparent distress:   Sitting in chair, Call bell within reach, and Caregiver / family present    COMMUNICATION/COLLABORATION:   The patients plan of care was discussed with: Registered nurse.      Jaleel Shafer

## 2021-12-10 NOTE — CONSULTS
Ortho Daily Progress Note    12/10/2021    POD:  6 Days Post-Op  S/P:  Procedure(s):  Open reduction intramedullary fixation of left peritrochanteric femur fracture    HPI: 81 yo F that is POD6, s/p LEFT HIP  intramedullary nail  -Trochanteric Fixation Nail with Dr. Arun Patrick on 12/4/21. The patient's pain is well controlled. . He has no other orthopedic complaints. Patient's daughter and caregiver at bedside. Patient states she is feeling well. Daughter was concerned about dressing not being changed regularly and noticed draining. No other concerns. She denies paresthesias, SOB, fever, chills. LABS:  Lab Results   Component Value Date/Time    HGB 8.9 (L) 12/07/2021 06:30 AM    INR 1.0 02/13/2021 07:11 AM     No results found for this or any previous visit (from the past 12 hour(s)). ORTHOPEDIC PHYSICAL EXAM:  LLE MSK: Left hip with dressing to the lateral hip which was C/D/I. Aquacel to distal lateral thigh, moderate saturation. Compartments of the LLE are soft and compressible, without pain. 2+ DP pulse. Cap refill is brisk. Sensation to light touch intact to the medial/lateral/posterior foot. DF/PF intact. Able to flex and wiggle toes. ORTHOPEDIC ASSESSMENT:   Comminuted displaced peritrochanteric fracture of the left femur that is now s/p Open reduction intramedullary fixation of left peritrochanteric femur fracture    ORTHOPEDIC PLAN:  1. Ortho plan: No further ortho surgical plans  2. DVT ppx: Lovenox 30mg every day; SCDs  3. Pain control: Adequate; per admitting team, ice and elevation. Ace wrap prn. 4. Dressing:  Order placed for daily dry sterile dressings. Please remove Aquacel and replace with dry sterile dressing. 5. Activity: Partial weight bearing LLE  6. Dispo: needs f/u in 2-3 weeks with Dr. Arun Patrick; refer to [place] instructions for further details. Will sign off, call with questions.     [place], [address]

## 2021-12-10 NOTE — PROGRESS NOTES
Bedside and Verbal shift change report given to SHITAL Boyle (oncoming nurse) by Jordi Day RN (offgoing nurse). Report included the following information SBAR, Kardex, Intake/Output, MAR, Accordion and Recent Results.

## 2021-12-10 NOTE — PROGRESS NOTES
EDYTA:Family does not want SNF placement. Plan will be for discharge home with [place]. [facility] has accepted patient for home PT and skilled nursing. Patient has hired caregivers and family lives across the street. Patient has all needed DME to include a Hospital bed, transport chair, wheelchair, walker, rollator, bedside commode. [place] ambulance transport scheduled for 11 AM Saturday to transport patient home. Ambulance PCS completed and located on hard chart. CM received call from the daughter, Rayray Stokes #[phone]. Daughter stated that she received a call from [place] stating that patient's appeal was denied. The daughter has medical qestions for the attending. CM sent perfect serve to attending to notify. CM offered to arrange ambulance transport home. Daughter would like CM to arrange ambulance transport with [facility] for 11 AM tomorrow.      Annie Motta, BSW/CRM

## 2021-12-10 NOTE — PROGRESS NOTES
Problem: Self Care Deficits Care Plan (Adult)  Goal: *Acute Goals and Plan of Care (Insert Text)  Description:   FUNCTIONAL STATUS PRIOR TO ADMISSION: Patient was modified independent using a rollator for functional mobility. Pt has supportive family and caregiver. They assist with IADLs (cooking and cleaning). However, she does all her own ADLs. Pt ambulates 1/2 mile a day with her rollator. HOME SUPPORT: lives with family      Occupational Therapy Goals  Initiated 12/5/2021  1. Patient will perform lower body ADLs with AE minimum assistance within 4 day(s). 2.  Patient will perform upper body ADLs standing 5 mins without fatigue or LOB with SBA within 4 day(s). 3.  Patient will perform toilet transfer with minimum assistance within 4 day(s). 4.  Patient will perform all aspects of toileting with minimum assistance within 4 day(s). 5.  Patient will perform seated bathing anterior neck to thighs with minimum assistance within 4 days. Outcome: Progressing Towards Goal     OCCUPATIONAL THERAPY TREATMENT  Patient: Cheryl Bal (75 y.o. female)  Date: 12/10/2021  Diagnosis: Hip fracture ([facility] [address] [S72.009A]   <principal problem not specified>  Procedure(s) (LRB):  LEFT HIP  intramedullary nail  -Trochanteric Fixation Nail (Left) 6 Days Post-Op  Precautions:    Chart, occupational therapy assessment, plan of care, and goals were reviewed. ASSESSMENT  Patient continues with skilled OT services and is progressing towards goals. Pt was able to progress from the [facility] to the chair and then standing to transfer again with CG x 2 persons. She was able to void bowels and bladder (400+ cc urine). She is demonstrating great improvements with activity tolerance and functional mobility skills. She will continue to require assist x 2 persons as she is able to stand with min A x 1 but needs additional support from 2nd persons for hygiene and clothing management.   Family continues to refuse discharge to rehab setting. If pt is discharged home, she will need assist x 2 persons for all safety tasks. Current Level of Function Impacting Discharge (ADLs): CG x 2 persons for sit to stand and BSC    Other factors to consider for discharge: debility, pain         PLAN :  Patient continues to benefit from skilled intervention to address the above impairments. Continue treatment per established plan of care to address goals. Recommend with staff: Langford Cornet as tolerated, continue to progress to the bathroom    Recommend next OT session: LB dressing activities    Recommendation for discharge: (in order for the patient to meet his/her long term goals)  No skilled occupational therapy/ follow up rehabilitation needs identified at this time. This discharge recommendation:  Has been made in collaboration with the attending provider and/or case management    IF patient discharges home will need the following DME: none       SUBJECTIVE:   Patient stated I am feeling alright.     OBJECTIVE DATA SUMMARY:   Cognitive/Behavioral Status:  Neurologic State: Alert  Orientation Level: Oriented to person; Oriented to place  Cognition: Follows commands  Perception: Appears intact  Perseveration: No perseveration noted  Safety/Judgement: Decreased awareness of environment; Decreased awareness of need for assistance; Decreased awareness of need for safety    Functional Mobility and Transfers for ADLs:  Bed Mobility:  Supine to Sit:  (received OOB in chair)  Sit to Supine:  (remained OOB in chair)    Transfers:  Sit to Stand: Contact guard assistance; Additional time; Assist x2  Functional Transfers  Bathroom Mobility:  (BSC use)  Toilet Transfer : Minimum assistance (assist x2 using RW)       Balance:  Sitting: Impaired  Sitting - Static: Fair (occasional)  Sitting - Dynamic: Poor (constant support)  Standing: Impaired; With support  Standing - Static: Constant support;  Fair  Standing - Dynamic : Constant support    ADL Intervention:     BSC to the chair and then standing to complete small steps again. She requires additional time for all activities. Cognitive Retraining  Safety/Judgement: Decreased awareness of environment; Decreased awareness of need for assistance; Decreased awareness of need for safety      Pain:  Minimal pain in the thigh    Activity Tolerance:   Good    After treatment patient left in no apparent distress:   Sitting in chair, Call bell within reach, and Caregiver / family present    COMMUNICATION/COLLABORATION:   The patients plan of care was discussed with: Physical therapist and Registered nurse.      José Miguel Hernandez OT  Time Calculation: 40 mins

## 2021-12-10 NOTE — PROGRESS NOTES
0040: RN noted some drainage of patient's aquacel on her left hip during shift assessment at 2121. RN went in to reassess drainage and change the dressing. RN changed dressing to ABD pad with tape. Patient was educated on why the dressing had to be changed due to the saturation of drainage. 0424: RN was called into room by patient's caregiver to help patient go to bedside commode. Patient's bed needed to be changed, RN and caregiver started to change the bed. Patient stated she still needed to urinate and couldn't stop, stated she was in so much pain. RN palpated lower abdomen, has some distention, RN went and got the bladder scanner and scanned patient's bladder for 502mL. RN explained straight cath procedure to remove the urine that is in patient's bladder. Patient and caregiver verbalized understanding of procedure.

## 2021-12-11 VITALS
WEIGHT: 114.64 LBS | HEART RATE: 81 BPM | OXYGEN SATURATION: 93 % | SYSTOLIC BLOOD PRESSURE: 148 MMHG | DIASTOLIC BLOOD PRESSURE: 73 MMHG | TEMPERATURE: 98.2 F | RESPIRATION RATE: 16 BRPM | BODY MASS INDEX: 22.39 KG/M2

## 2021-12-11 PROCEDURE — 74011250637 HC RX REV CODE- 250/637: Performed by: NURSE PRACTITIONER

## 2021-12-11 PROCEDURE — 74011250637 HC RX REV CODE- 250/637: Performed by: PHYSICIAN ASSISTANT

## 2021-12-11 RX ADMIN — Medication 10 ML: at 06:00

## 2021-12-11 RX ADMIN — DOCUSATE SODIUM 50 MG AND SENNOSIDES 8.6 MG 1 TABLET: 8.6; 5 TABLET, FILM COATED ORAL at 09:11

## 2021-12-11 RX ADMIN — ACETAMINOPHEN 650 MG: 325 TABLET ORAL at 04:58

## 2021-12-11 RX ADMIN — ACETAMINOPHEN 650 MG: 325 TABLET ORAL at 09:11

## 2021-12-11 NOTE — DISCHARGE INSTRUCTIONS
Post-op Discharge Instructions  MD [address]  [phone]  Dorita Abrams See Dr. Sina Nash approximately 3-4 weeks from date of surgery. Call [phone] to make an appointment.  Call Kavon Simmons RN if you have questions or concerns, [phone]. Activity   Use your walker for ambulation. Partial weightbearing on left leg, walker at all times. Get up every hour you are awake and take a brief walk. Lengthen walking distance daily as your strength improves.  Continue using your walker until seen in the office for your first follow up visit.  Practice your exercises 3 times daily as instructed by the physical therapist. Heather Trejo for 20 minutes after exercising.  No driving until seen in the office for your first follow up visit. Incision Care   The light brown Aquacel surgical dressing is waterproof and is to remain on your incision for 7 days. On the 7th day, carefully lift the edge of the dressing to break the adhesive seal and gently peel it off.  If your Aquacel dressings comes loose or falls off before the 7th day, replace it with a dry sterile gauze dressing and change this dressing daily. Once there is no drainage on the bandage, you mean leave the incision open to air.  You may take a shower with the Aquacel dressing in place. After you remove the Aquacel dressing on day 7, you may continue to shower and get your incision wet in the shower. Do not submerge your incision under water in a bathtub, hot tub, swimming pool, etc. until after you have been evaluated at your first office visit. Medications   Blood Clot Prevention: Take medication as prescribed by your physician for 4 weeks postop.  Pain Management: Take pain medication as prescribed; wean yourself off of pain medication as your pain lessens. Take with food.  You make also take Tylenol every 4-6 hours as needed for pain. Do not exceed 3 grams (3000mg) per day.    Place an ice bag on or around the incision for 20 minutes on / 20 minutes off as needed throughout the day and night, especially after exercising.  Stool Softener: You may want to take a stool softener (such as Senokot-S or Colace) to prevent constipation while taking pain medication. If constipation occurs, you may also use a laxative (such as Dulcolax tablets, Miralax, or a suppository). Diet   Resume usual diet at home. Drink plenty of fluids. Eat foods high in fiber and protein. Calcium and Vitamin D supplements recommended. Avoid alcoholic beverages. No smoking. When to call your Orthopaedic Surgeon: If you call after 5pm or on a weekend, the on call physician will return your call   Pain that is not relieved by pain medication, ice, and activity modification   Signs of infection (red incision, continuous drainage from the incision, malodorous drainage, persistent fever greater than 101 degrees Fahrenheit)   Signs of a blood clot in your leg (calf pain, tenderness, redness, and/or swelling of the lower leg)  ? When to call your Primary Care Physician   Concerns about your medical conditions such as diabetes, high blood pressure, asthma, congestive heart failure   Call if blood sugars are elevated, if you have a persistent headache or dizziness, coughing or congestion, constipation or diarrhea, burning with urination, abnormal heart rate (fast or slow)  When to call 911 and go to the nearest Emergency Room   Acute onset of chest pain, shortness of breath, difficulty breathing           Discharge Instructions       PATIENT ID: Mode Dean  MRN: [id number]   YOB: 1928    DATE OF ADMISSION: 12/3/2021  1:10 PM    DATE OF DISCHARGE: 12/11/2021    PRIMARY CARE PROVIDER: Angie Bañuelos MD     ATTENDING PHYSICIAN: Teresita Vasquez MD  DISCHARGING PROVIDER: Priscila Garcia NP    To contact this individual call [phone] and ask the  to page.    If unavailable ask to be transferred the Adult Hospitalist Department. DISCHARGE DIAGNOSES fem fx    CONSULTATIONS: ED CONSULT TO SENIOR SERVICES CASE MANAGEMENT  IP CONSULT TO ORTHOPEDIC SURGERY  IP CONSULT TO ORTHOPEDIC SURGERY    PROCEDURES/SURGERIES: Procedure(s):  LEFT HIP  intramedullary nail  -Trochanteric Fixation Nail    PENDING TEST RESULTS:   At the time of discharge the following test results are still pending:     FOLLOW UP APPOINTMENTS:   Follow-up Information     Follow up With Specialties Details Why Contact Info    Girish Cardona MD Family Medicine In 3 days Call to schedule posthospitalization follow-up to be seen within 3 days of hospital discharge [address] [facility] 200      Cathy La MD Orthopedic Surgery In 2 weeks Call to schedule post hospitalization follow-up [address] [facility]  for home health skilled nursing and PT 3 [facility]  [phone]           ADDITIONAL CARE RECOMMENDATIONS:     DIET: Regular Diet      ACTIVITY: Activity as tolerated, partial weight bearing    WOUND CARE: dry dressing , change daily    EQUIPMENT needed: walker      DISCHARGE MEDICATIONS:   See Medication Reconciliation Form    · It is important that you take the medication exactly as they are prescribed. · Keep your medication in the bottles provided by the pharmacist and keep a list of the medication names, dosages, and times to be taken in your wallet. · Do not take other medications without consulting your doctor. NOTIFY YOUR PHYSICIAN FOR ANY OF THE FOLLOWING:   Fever over 101 degrees for 24 hours. Chest pain, shortness of breath, fever, chills, nausea, vomiting, diarrhea, change in mentation, falling, weakness, bleeding. Severe pain or pain not relieved by medications. Or, any other signs or symptoms that you may have questions about.       DISPOSITION:    Home With:   OT  PT x [facility] SNF/Inpatient Rehab/LTAC    Independent/assisted living    Hospice    Other:           Signed:   Dayan Almonte NP  12/11/2021  11:31 AM

## 2021-12-11 NOTE — DISCHARGE SUMMARY
Discharge Summary       PATIENT ID: Marino Proctor  MRN: [id number]   YOB: 1928    DATE OF ADMISSION: 12/3/2021  1:10 PM    DATE OF DISCHARGE: 2021   PRIMARY CARE PROVIDER: Corinne Apgar, MD     ATTENDING PHYSICIAN: Freya Jansen MD  DISCHARGING PROVIDER: Edmundo Queen NP    To contact this individual call [phone] and ask the  to page. If unavailable ask to be transferred the Adult Hospitalist Department. CONSULTATIONS: ED CONSULT TO SENIOR SERVICES CASE MANAGEMENT  IP CONSULT TO ORTHOPEDIC SURGERY  IP CONSULT TO ORTHOPEDIC SURGERY    PROCEDURES/SURGERIES: Procedure(s):  LEFT HIP  intramedullary nail  -Trochanteric Fixation Nail    ADMITTING DIAGNOSES & HOSPITAL COURSE:   Per the H&P,\"80year-old female history of A. fib, breast cancer, GERD, and mild dementia presents to the emergency department today by EMS after concern for a fall yesterday.  She apparently fell in the bathroom yesterday injuring her left hip. The fall occurred while walking in the bathroom with her walker. She fell from a height of ground level. There was no blood loss. The point of impact was the left hip. The pain is present in the left hip. The pain is mild. She was not ambulatory at the scene, and caregiver helped her back to bed.  she had outpatient x-ray yesterday, there are no results available but EMS reports that the patient was called earlier this morning with a positive left hip fracture.  Patient denies any other injuries, she has some mild pain at the left hip. \"        DISCHARGE DIAGNOSES / PLAN:      Left intertrochanteric femur fracture  S/p fall, traumatic fracture. Postop day 7, TFN.   Pain currently controlled  PT/OT following, partial weightbearing to left lower extremity  To follow-up with Dr. Ryan Rome in 2 to 3 weeks  Orthopedics recommending daily dressing changes  Continuing low molecular weight heparin for prophylaxis  Follow-up with orthopedics outpatient              Atrial fibrillation  Heart rate currently controlled  Not on anticoagulation  Continuing to monitor     GERD  Stable  Continuing famotidine     Acute blood loss anemia  Did receive 1 unit of packed red cells on 12/5/2021. Likely due to femoral fracture/surgery  Hemoglobin remained stable since       Mild dementia  High risk for delirium  Reorient as needed     Electrolyte imbalance  Have mild hyponatremia which has since resolved  Phosphorus had been low, has been repleted             PENDING TEST RESULTS:   At the time of discharge the following test results are still pending:     FOLLOW UP APPOINTMENTS:    Follow-up Information     Follow up With Specialties Details Why Contact Katherine Webber MD Family Medicine In 3 days Call to schedule posthospitalization follow-up to be seen within 3 days of hospital discharge [address] [facility] [[email or web address] 200      Continental Bruno, MD Orthopedic Surgery In 2 weeks Call to schedule post [facility] and [facility] 3 [address]  [phone]           ADDITIONAL CARE RECOMMENDATIONS:     DIET: Regular Diet      ACTIVITY: Activity as tolerated, partial weightbearing left lower extremity    WOUND CARE: Dry dressing change daily    EQUIPMENT needed: Walker      DISCHARGE MEDICATIONS:  Current Discharge Medication List      START taking these medications    Details   enoxaparin (LOVENOX) 30 mg/0.3 mL injection 0.3 mL by SubCUTAneous route every twenty-four (24) hours for 21 days. Qty: 6.3 mL, Refills: 0  Start date: 12/10/2021, End date: 12/31/2021         CONTINUE these medications which have NOT CHANGED    Details   famotidine (PEPCID) 40 mg tablet Take 1 Tab by mouth nightly. Qty: 90 Tab, Refills: 3      polyethylene glycol (MIRALAX) 17 gram packet Take 1 Packet by mouth daily.   Qty: 30 Packet, Refills: 0      acetaminophen (TYLENOL) 325 mg tablet Take 2 Tabs by mouth every six (6) hours as needed for Pain. Qty: 100 Tab, Refills: 0      bisacodyL (DULCOLAX) 10 mg supp Insert 10 mg into rectum daily as needed for Constipation (For unrelieved constipation. ). Qty: 5 Suppository, Refills: 0      calcium-vitamin D (OS-CARLOS +D3) 500 mg-200 unit per tablet Take 1 Tab by mouth two (2) times daily (with meals). Qty: 60 Tab, Refills: 5               NOTIFY YOUR PHYSICIAN FOR ANY OF THE FOLLOWING:   Fever over 101 degrees for 24 hours. Chest pain, shortness of breath, fever, chills, nausea, vomiting, diarrhea, change in mentation, falling, weakness, bleeding. Severe pain or pain not relieved by medications. Or, any other signs or symptoms that you may have questions about.     DISPOSITION:    Home With:   OT  PT x HH  RN       Long term SNF/Inpatient Rehab    Independent/assisted living    Hospice    Other:       PATIENT CONDITION AT DISCHARGE:     Functional status    Poor    x Deconditioned     Independent      Cognition     Lucid    x Forgetful     Dementia      Catheters/lines (plus indication)    Grant     PICC     PEG    x None      Code status    x Full code     DNR      PHYSICAL EXAMINATION AT DISCHARGE:   Refer to Progress Note      CHRONIC MEDICAL DIAGNOSES:  Problem List as of 12/11/2021 Date Reviewed: 2/9/2021          Codes Class Noted - Resolved    Mild dementia ([facility] ICD-10-CM: F03.90  ICD-9-CM: 294.20  2/6/2021 - Present        Osteopenia ICD-10-CM: M85.80  ICD-9-CM: 733.90  11/17/2020 - Present        Hip fracture ([facility] ICD-10-CM: S72.009A  ICD-9-CM: 820.8  11/14/2020 - Present        Closed right hip fracture, initial encounter ([facility] ICD-10-CM: S72.001A  ICD-9-CM: 820.8  11/13/2020 - Present        Paroxysmal atrial fibrillation ([facility] ICD-10-CM: I48.0  ICD-9-CM: 427.31  11/13/2020 - Present        Senile purpura ([facility] ICD-10-CM: W80.2  ICD-9-CM: 287.2  9/26/2020 - Present        GERD (gastroesophageal reflux disease) ICD-10-CM: K21.9  ICD-9-CM: 530.81  Unknown - Present              Greater than 30 minutes were spent with the patient on counseling and coordination of care    Signed:   Rosanna Dotson NP  12/11/2021  11:38 AM

## 2021-12-11 NOTE — PROGRESS NOTES
Patient discharged to home in the company of her daughter. At the time of discharge the patient presented as alert and oriented, she had no complaints of pain, nor did she show any s/s of distress. Vital signs noted within normal limits. Prior to discharge the patient received a sponge bath with application of her own personal robe and gown. Surgical dressing to her left lower extremity was changed with ABD pad and cloth tape applied. Incision was noted as clean, dry, and approximated. Discharge instructions provided to patient and her daughter to include medication reconciliation, incision care, and information regarding follow up appointment. Family member and patient expressed her understanding of discharge instructions. Patient transported via ambulance with all personal belongings taken.

## 2021-12-23 ENCOUNTER — OFFICE VISIT (OUTPATIENT)
Dept: ORTHOPEDIC SURGERY | Age: 86
End: 2021-12-23

## 2021-12-23 VITALS — HEIGHT: 61 IN | BODY MASS INDEX: 21.9 KG/M2 | WEIGHT: 116 LBS

## 2021-12-23 DIAGNOSIS — Z09 SURGERY FOLLOW-UP: ICD-10-CM

## 2021-12-23 DIAGNOSIS — Z87.81 STATUS POST FRACTURE OF LEFT HIP: Primary | ICD-10-CM

## 2021-12-23 DIAGNOSIS — Z09 S/P ORTHOPEDIC SURGERY, FOLLOW-UP EXAM: ICD-10-CM

## 2021-12-23 PROCEDURE — 99024 POSTOP FOLLOW-UP VISIT: CPT | Performed by: PHYSICIAN ASSISTANT

## 2021-12-23 NOTE — PROGRESS NOTES
Tyree Morgan (: 3/7/1928) is a 80 y.o. female, patient, here for evaluation of the following chief complaint(s):  Surgical Follow-up (left hip IM Nail 2021)       SUBJECTIVE/OBJECTIVE:  Tyree Morgan presents today for their first postop visit status post IM nail left hip fracture. She has been compliant with her partial weightbearing with assistance of her daughter and a nurses aide. She has care basically . Staples still in place. PHYSICAL EXAM:  Vitals: Ht 5' 1\" (1.549 m)   Wt 116 lb (52.6 kg)   BMI 21.92 kg/m²   Body mass index is 21.92 kg/m². 80y.o. year old F in no acute distress. Examined in wheelchair. She could rise with assistance to stand for evaluation of her left hip incisions, well approximated, well-healed. Staples still in place. No surrounding warmth, erythema, drainage. No distal edema. Motor 5/5. IMAGING:  Radiographs: XR Results (most recent):  Results from Appointment encounter on 21    XR FEMUR LT 2 V    Narrative  2 views, AP/lateral left femur taken and reviewed today. Satisfactory position and alignment of long IM nail. Alignment unchanged from intraoperative films. ASSESSMENT/PLAN:  1. Status post fracture of left hip  -     XR FEMUR LT 2 V; Future  2. S/P orthopedic surgery, follow-up exam  3. Surgery follow-up    First postop visit status post IM nail of her left hip fracture done 2021. Continue partial weightbearing status. Ambulate with walker, most of her weight through her upper body, with assistance at all times. Staples removed today without difficulty. Follow-up in 4 weeks for clinical recheck and repeat left femur x-rays. Continue home health physical therapy. Return in about 4 weeks (around 2022) for POV #2 with Dr. Blayne Campbell.     Review Of Systems  ROS     Positive for: Musculoskeletal (left hip )    Negative for: Constitutional, Gastrointestinal, Neurological, Skin, Genitourinary, HENT, Endocrine, Cardiovascular, Eyes, Respiratory, Psychiatric, Allergic/Imm, Heme/Lymph    Last edited by Ramya Dunlap RN on 12/23/2021  2:19 PM. (History)         Patient denies any recent fever, chills, nausea, vomiting, chest pain, or shortness of breath. Allergies   Allergen Reactions    Ciprofloxacin Other (comments)     Bad yeast inf    Codeine Nausea and Vomiting    Hydrocodone Other (comments)     \"burning the esophagus to the point patient can't swallow per family and patient\"    Lexapro [Escitalopram] Other (comments)     lethargy    Morphine Nausea Only    Oxycodone Other (comments)     \"burning of esophagus\"    Penicillins Unknown (comments)       Current Outpatient Medications   Medication Sig    rivaroxaban (XARELTO) 10 mg tablet Take 1 Tablet by mouth daily for 20 doses.  famotidine (PEPCID) 40 mg tablet Take 1 Tab by mouth nightly.  polyethylene glycol (MIRALAX) 17 gram packet Take 1 Packet by mouth daily.  acetaminophen (TYLENOL) 325 mg tablet Take 2 Tabs by mouth every six (6) hours as needed for Pain.  bisacodyL (DULCOLAX) 10 mg supp Insert 10 mg into rectum daily as needed for Constipation (For unrelieved constipation. ).  calcium-vitamin D (OS-CARLOS +D3) 500 mg-200 unit per tablet Take 1 Tab by mouth two (2) times daily (with meals). No current facility-administered medications for this visit.        Past Medical History:   Diagnosis Date    Atrial fibrillation ([address]     Cancer ([address]     radiation and lumpectony    GERD (gastroesophageal reflux disease)     Hearing impaired     Mild dementia ([address] 2/6/2021    Paroxysmal atrial fibrillation ([address]     a.fib 11/2010,    NS 02/2021        Past Surgical History:   Procedure Laterality Date    HX HIP REPLACEMENT Left 12/04/2021    IM NAIL    HX ORTHOPAEDIC      right hip    IR KYPHOPLASTY LUMBAR  8/19/2019    OH BREAST SURGERY PROCEDURE UNLISTED         Family History   Problem Relation Age of Onset    Hypertension Mother    Al Puentes Heart Disease Mother     Cancer Mother         breast    Cancer Father         prostate        Social History     Socioeconomic History    Marital status:      Spouse name: Not on file    Number of children: Not on file    Years of education: Not on file    Highest education level: Not on file   Occupational History    Not on file   Tobacco Use    Smoking status: Former Smoker    Smokeless tobacco: Never Used   Substance and Sexual Activity    Alcohol use: No    Drug use: No    Sexual activity: Not on file   Other Topics Concern    Not on file   Social History Narrative    Not on file     Social Determinants of Health     Financial Resource Strain:     Difficulty of Paying Living Expenses: Not on file   Food Insecurity:     Worried About [address] in the Last Year: Not on file    [address] in the Last Year: Not on file   Transportation Needs:     Lack of Transportation (Medical): Not on file    Lack of Transportation (Non-Medical):  Not on file   Physical Activity:     Days of Exercise per Week: Not on file    Minutes of Exercise per Session: Not on file   Stress:     Feeling of Stress : Not on file   Social Connections:     Frequency of Communication with Friends and Family: Not on file    Frequency of Social Gatherings with Friends and Family: Not on file    Attends Worship Services: Not on file    Active Member of [address] [place] [address] or Organizations: Not on file    Attends Club or Organization Meetings: Not on file    Marital Status: Not on file   Intimate Partner Violence:     Fear of Current or Ex-Partner: Not on file    Emotionally Abused: Not on file    Physically Abused: Not on file    Sexually Abused: Not on file   Housing Stability:     Unable to Pay for Housing in the Last Year: Not on file    Number of [place] in the Last Year: Not on file    Unstable Housing in the Last Year: Not on file         Orders Placed This Encounter    XR FEMUR LT 2 V Standing Status:   Future     Number of Occurrences:   1     Standing Expiration Date:   12/24/2022        Orville Reese M.D. was available for immediate consultation as the supervising physician. An electronic signature was used to authenticate this note.   -- Francie Srinivasan PA-C

## 2022-01-03 ENCOUNTER — DOCUMENTATION ONLY (OUTPATIENT)
Dept: ORTHOPEDIC SURGERY | Age: 87
End: 2022-01-03

## 2022-01-03 NOTE — PROGRESS NOTES
Caregiver FMLA/STD filled out and faxed to [facility] [employer] by RN 12/30/2021 fx [phone]

## 2022-01-20 ENCOUNTER — OFFICE VISIT (OUTPATIENT)
Dept: ORTHOPEDIC SURGERY | Age: 87
End: 2022-01-20

## 2022-01-20 VITALS — WEIGHT: 116 LBS | BODY MASS INDEX: 21.9 KG/M2 | HEIGHT: 61 IN

## 2022-01-20 DIAGNOSIS — Z96.641 STATUS POST RIGHT HIP REPLACEMENT: ICD-10-CM

## 2022-01-20 DIAGNOSIS — S72.142D DISPLACED INTERTROCHANTERIC FRACTURE OF LEFT FEMUR, SUBSEQUENT ENCOUNTER FOR CLOSED FRACTURE WITH ROUTINE HEALING: ICD-10-CM

## 2022-01-20 DIAGNOSIS — T84.030S MECHANICAL LOOSENING OF INTERNAL RIGHT HIP PROSTHETIC JOINT, SEQUELA: ICD-10-CM

## 2022-01-20 DIAGNOSIS — Z87.81 STATUS POST FRACTURE OF LEFT HIP: Primary | ICD-10-CM

## 2022-01-20 PROCEDURE — G8420 CALC BMI NORM PARAMETERS: HCPCS | Performed by: ORTHOPAEDIC SURGERY

## 2022-01-20 PROCEDURE — 99024 POSTOP FOLLOW-UP VISIT: CPT | Performed by: ORTHOPAEDIC SURGERY

## 2022-01-20 PROCEDURE — 99213 OFFICE O/P EST LOW 20 MIN: CPT | Performed by: ORTHOPAEDIC SURGERY

## 2022-01-20 PROCEDURE — 1090F PRES/ABSN URINE INCON ASSESS: CPT | Performed by: ORTHOPAEDIC SURGERY

## 2022-01-20 PROCEDURE — 1101F PT FALLS ASSESS-DOCD LE1/YR: CPT | Performed by: ORTHOPAEDIC SURGERY

## 2022-01-20 PROCEDURE — G8427 DOCREV CUR MEDS BY ELIG CLIN: HCPCS | Performed by: ORTHOPAEDIC SURGERY

## 2022-01-20 PROCEDURE — G8432 DEP SCR NOT DOC, RNG: HCPCS | Performed by: ORTHOPAEDIC SURGERY

## 2022-01-20 PROCEDURE — G8536 NO DOC ELDER MAL SCRN: HCPCS | Performed by: ORTHOPAEDIC SURGERY

## 2022-01-20 NOTE — PROGRESS NOTES
Matthew Aranda (: 3/7/1928) is a 80 y.o. female, patient, here for evaluation of the following chief complaint(s):  Surgical Follow-up (left femur fracture)       SUBJECTIVE/OBJECTIVE:  Matthew Aranda presents today for routine follow-up of left intertrochanteric femur fracture. Patient's daughter would also like follow-up exam of her right total hip replacement. She is now 6 weeks out from fixation of left intertrochanteric femur fracture. Significant improvement in pain. Relates some mild discomfort with her exercises. She gets around quite well with her Rollator. She previously had conversion of prior right hip surgery to total hip, about a year ago. There were some acetabular complications, with migration of the component through the medial wall. Despite this, up until about 6 weeks ago when she fractured her left femur, she was walking half a mile to a mile a day with little to no discomfort in the right hip. She continues to have almost no symptoms on the right side. PHYSICAL EXAM:  Vitals: Ht 5' 1\" (1.549 m)   Wt 116 lb (52.6 kg)   BMI 21.92 kg/m²   Body mass index is 21.92 kg/m². 80y.o. year old F, no distress. Ambulates with very slow gait. She can rise from chair independently. Right lateral hip scar well-healed. No trochanteric tenderness. Mild discomfort with resisted active flexion of the right hip. No pain with passive rotation. Left lateral hip and thigh scars are well-healed. Mild discomfort with resisted flexion. No pain at extremes of hip rotation. Pain-free motion left knee. Symmetrical palpable distal pulses. No gross motor or sensory deficits in lower extremities. No distal edema. IMAGING:  Radiographs: XR Results (maximum last 2): Results from Appointment encounter on 22    XR PELV 1 OR 2 V    Narrative  Single AP pelvis x-ray demonstrates peritrochanteric fracture of the left hip with trochanteric femoral nail present.   No lucency around the lag screw. No change in position or alignment compared to previous x-rays. Cementless right total hip components present. Satisfactory position and alignment of the femoral stem with signs of ingrowth present. Acetabular component is loose and has migrated through the medial wall. Position is stable compared to previous x-rays approximately 6 weeks ago. Appears to have had prior vertebroplasty at L4. Osteopenia. XR FEMUR LT 2 V    Narrative  2 views of the left femur, AP and lateral, demonstrate presence of trochanteric femoral nail. Comminuted peritrochanteric fracture, callus formation evident. No change in position or alignment compared to previous x-rays. No lucency around lag screw in the femoral head neck. ASSESSMENT/PLAN:  1. Status post fracture of left hip  -     XR FEMUR LT 2 V; Future  -     XR PELV 1 OR 2 V; Future  2. Displaced intertrochanteric fracture of left femur, subsequent encounter for closed fracture with routine healing  3. Status post right hip replacement  4. 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 Healing left intertrochanteric femur fracture. Continue weightbearing activities as tolerated. 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 The component has not moved relative to x-rays in early December. She really has no symptoms on that side. The patient and her daughter understand she could have sudden catastrophic failure at any time, but she is not going to be a candidate for reconstruction. I think it is okay for her to continue with activities as tolerated. Return in 6 weeks for repeat x-ray of the left hip. Return in about 6 weeks (around 3/3/2022).             Review Of Systems  ROS     Positive for: Musculoskeletal    Last edited by Bennett Prader, RN on 1/20/2022  2:37 PM. (History) Patient denies any recent fever, chills, nausea, vomiting, chest pain, or shortness of breath. Allergies   Allergen Reactions    Ciprofloxacin Other (comments)     Bad yeast inf    Codeine Nausea and Vomiting    Hydrocodone Other (comments)     \"burning the esophagus to the point patient can't swallow per family and patient\"    Lexapro [Escitalopram] Other (comments)     lethargy    Morphine Nausea Only    Oxycodone Other (comments)     \"burning of esophagus\"    Penicillins Unknown (comments)       Current Outpatient Medications   Medication Sig    famotidine (PEPCID) 40 mg tablet Take 1 Tab by mouth nightly.  polyethylene glycol (MIRALAX) 17 gram packet Take 1 Packet by mouth daily.  acetaminophen (TYLENOL) 325 mg tablet Take 2 Tabs by mouth every six (6) hours as needed for Pain.  bisacodyL (DULCOLAX) 10 mg supp Insert 10 mg into rectum daily as needed for Constipation (For unrelieved constipation. ).  calcium-vitamin D (OS-CARLOS +D3) 500 mg-200 unit per tablet Take 1 Tab by mouth two (2) times daily (with meals). No current facility-administered medications for this visit.        Past Medical History:   Diagnosis Date    Atrial fibrillation ([address]     Cancer ([address]     radiation and lumpectony    GERD (gastroesophageal reflux disease)     Hearing impaired     Mild dementia ([address] 2/6/2021    Paroxysmal atrial fibrillation ([address]     a.fib 11/2010,    NS 02/2021        Past Surgical History:   Procedure Laterality Date    HX HIP REPLACEMENT Left 12/04/2021    IM NAIL    HX ORTHOPAEDIC      right hip    IR KYPHOPLASTY LUMBAR  8/19/2019    OR BREAST SURGERY PROCEDURE UNLISTED         Family History   Problem Relation Age of Onset    Hypertension Mother     Heart Disease Mother     Cancer Mother         breast    Cancer Father         prostate        Social History     Socioeconomic History    Marital status:      Spouse name: Not on file    Number of children: Not on file  Years of education: Not on file    Highest education level: Not on file   Occupational History    Not on file   Tobacco Use    Smoking status: Former Smoker    Smokeless tobacco: Never Used   Substance and Sexual Activity    Alcohol use: No    Drug use: No    Sexual activity: Not on file   Other Topics Concern    Not on file   Social History Narrative    Not on file     Social Determinants of Health     Financial Resource Strain:     Difficulty of Paying Living Expenses: Not on file   Food Insecurity:     Worried About Running Out of Food in the Last Year: Not on file    Jessie of Food in the Last Year: Not on file   Transportation Needs:     Lack of Transportation (Medical): Not on file    Lack of Transportation (Non-Medical):  Not on file   Physical Activity:     Days of Exercise per Week: Not on file    Minutes of Exercise per Session: Not on file   Stress:     Feeling of Stress : Not on file   Social Connections:     Frequency of Communication with Friends and Family: Not on file    Frequency of Social Gatherings with Friends and Family: Not on file    Attends Yarsanism Services: Not on file    Active Member of [address] [place] [address] or Organizations: Not on file    Attends Club or Organization Meetings: Not on file    Marital Status: Not on file   Intimate Partner Violence:     Fear of Current or Ex-Partner: Not on file    Emotionally Abused: Not on file    Physically Abused: Not on file    Sexually Abused: Not on file   Housing Stability:     Unable to Pay for Housing in the Last Year: Not on file    Number of [place] in the Last Year: Not on file    Unstable Housing in the Last Year: Not on file       Orders Placed This Encounter    XR FEMUR LT 2 V     Room 3c     Standing Status:   Future     Number of Occurrences:   1     Standing Expiration Date:   1/21/2023    XR PELV 1 OR 2 V     Standing Status:   Future     Number of Occurrences:   1     Standing Expiration Date:   1/20/2023 An electronic signature was used to authenticate this note.   -- Alisa Bolaños MD

## 2022-01-20 NOTE — LETTER
1/20/2022    Patient: Emilie Clancy   YOB: 1928   Date of Visit: 1/20/2022     Miguel Panchal MD  [facility]    Dear Miguel Panchal MD,      Thank you for referring Ms. Russel Melgar to [facility] for evaluation. My notes for this consultation are attached. If you have questions, please do not hesitate to call me. I look forward to following your patient along with you.       Sincerely,    Alicia Spaulding MD

## 2022-03-03 ENCOUNTER — OFFICE VISIT (OUTPATIENT)
Dept: ORTHOPEDIC SURGERY | Age: 87
End: 2022-03-03

## 2022-03-03 VITALS — HEIGHT: 60 IN | BODY MASS INDEX: 22.58 KG/M2 | WEIGHT: 115 LBS

## 2022-03-03 DIAGNOSIS — S72.142D DISPLACED INTERTROCHANTERIC FRACTURE OF LEFT FEMUR, SUBSEQUENT ENCOUNTER FOR CLOSED FRACTURE WITH ROUTINE HEALING: Primary | ICD-10-CM

## 2022-03-03 DIAGNOSIS — Z87.81 STATUS POST FRACTURE OF LEFT HIP: ICD-10-CM

## 2022-03-03 PROCEDURE — 99024 POSTOP FOLLOW-UP VISIT: CPT | Performed by: ORTHOPAEDIC SURGERY

## 2022-03-03 NOTE — LETTER
3/7/2022    Patient: Karina Prado   YOB: 1928   Date of Visit: 3/3/2022     Danita Hardwick MD  [facility]    Dear Danita Hardwick MD,      Thank you for referring Ms. Nola Kimball to [place] for evaluation. My notes for this consultation are attached. If you have questions, please do not hesitate to call me. I look forward to following your patient along with you.       Sincerely,    Sally Yepez MD

## 2022-03-07 NOTE — PROGRESS NOTES
Tony Parker (: 3/7/1928) is a 80 y.o. female, patient, here for evaluation of the following chief complaint(s):  Surgical Follow-up and Hip Pain       SUBJECTIVE/OBJECTIVE:  Tony Parker presents today for routine follow-up after intramedullary fixation of left peritrochanteric femur fracture. She is almost 12 weeks postop. Relates mild discomfort with activities, but no significant pain. She is able to walk a loop around the block with her walker, as she was doing prior to surgery. PHYSICAL EXAM:  Vitals: Ht 5' (1.524 m)   Wt 115 lb (52.2 kg)   BMI 22.46 kg/m²   Body mass index is 22.46 kg/m². 80y.o. year old F, no distress. Healed left lateral hip and thigh incisions. Trace residual local edema laterally over the hip. No erythema. No pain with resisted left hip flexion and extremes of rotation. No distal edema. No calf tenderness. IMAGING:  Radiographs: XR Results (most recent):  Results from Appointment encounter on 22    XR HIP LT W OR WO PELV 2-3 VWS    Narrative  3 x-ray views of left hip including AP pelvis, AP and frog lateral images that include the entire femur demonstrate satisfactory position alignment of intramedullary hip screw with long nail. No change in position or alignment of the peritrochanteric fracture. Increased callus formation. No lucency around hip screw in the femoral head. ASSESSMENT/PLAN:  1. Displaced intertrochanteric fracture of left femur, subsequent encounter for closed fracture with routine healing  -     XR HIP LT W OR WO PELV 2-3 VWS; Future  2. Status post fracture of left hip  -     XR HIP LT W OR WO PELV 2-3 VWS; Future    The xray and exam findings were discussed with the patient today. Slow steady progress. Ongoing fracture healing. Continue weightbearing activities as tolerated. Return in 7 to 8 weeks for 1 more x-ray of the left hip, sooner if needed.      Return in about 7 weeks (around 4/21/2022). Review Of Systems  ROS     Positive for: Musculoskeletal    Last edited by Mary Carmen Medrano RN on 3/3/2022  2:39 PM. (History)         Patient denies any recent fever, chills, nausea, vomiting, chest pain, or shortness of breath. Allergies   Allergen Reactions    Ciprofloxacin Other (comments)     Bad yeast inf    Codeine Nausea and Vomiting    Hydrocodone Other (comments)     \"burning the esophagus to the point patient can't swallow per family and patient\"    Lexapro [Escitalopram] Other (comments)     lethargy    Morphine Nausea Only    Oxycodone Other (comments)     \"burning of esophagus\"    Penicillins Unknown (comments)       Current Outpatient Medications   Medication Sig    famotidine (PEPCID) 40 mg tablet Take 1 Tab by mouth nightly.  polyethylene glycol (MIRALAX) 17 gram packet Take 1 Packet by mouth daily.  acetaminophen (TYLENOL) 325 mg tablet Take 2 Tabs by mouth every six (6) hours as needed for Pain.  bisacodyL (DULCOLAX) 10 mg supp Insert 10 mg into rectum daily as needed for Constipation (For unrelieved constipation. ).  calcium-vitamin D (OS-CARLOS +D3) 500 mg-200 unit per tablet Take 1 Tab by mouth two (2) times daily (with meals). No current facility-administered medications for this visit.        Past Medical History:   Diagnosis Date    Atrial fibrillation ([address]     Cancer ([address]     radiation and lumpectony    GERD (gastroesophageal reflux disease)     Hearing impaired     Mild dementia ([address] 2/6/2021    Paroxysmal atrial fibrillation ([address]     a.fib 11/2010,    NS 02/2021        Past Surgical History:   Procedure Laterality Date    HX HIP REPLACEMENT Left 12/04/2021    IM NAIL    HX ORTHOPAEDIC      right hip    IR KYPHOPLASTY LUMBAR  8/19/2019    UT BREAST SURGERY PROCEDURE UNLISTED         Family History   Problem Relation Age of Onset    Hypertension Mother     Heart Disease Mother     Cancer Mother         breast    Cancer Father prostate        Social History     Socioeconomic History    Marital status:      Spouse name: Not on file    Number of children: Not on file    Years of education: Not on file    Highest education level: Not on file   Occupational History    Not on file   Tobacco Use    Smoking status: Former Smoker    Smokeless tobacco: Never Used   Substance and Sexual Activity    Alcohol use: No    Drug use: No    Sexual activity: Not on file   Other Topics Concern    Not on file   Social History Narrative    Not on file     Social Determinants of Health     Financial Resource Strain:     Difficulty of Paying Living Expenses: Not on file   Food Insecurity:     Worried About [address] in the Last Year: Not on file    [address] in the Last Year: Not on file   Transportation Needs:     Lack of Transportation (Medical): Not on file    Lack of Transportation (Non-Medical):  Not on file   Physical Activity:     Days of Exercise per Week: Not on file    Minutes of Exercise per Session: Not on file   Stress:     Feeling of Stress : Not on file   Social Connections:     Frequency of Communication with Friends and Family: Not on file    Frequency of Social Gatherings with Friends and Family: Not on file    Attends Mandaen Services: Not on file    Active Member of [address] [place] [address] or Organizations: Not on file    Attends Club or Organization Meetings: Not on file    Marital Status: Not on file   Intimate Partner Violence:     Fear of Current or Ex-Partner: Not on file    Emotionally Abused: Not on file    Physically Abused: Not on file    Sexually Abused: Not on file   Housing Stability:     Unable to Pay for Housing in the Last Year: Not on file    Number of [place] in the Last Year: Not on file    Unstable Housing in the Last Year: Not on file       Orders Placed This Encounter    XR HIP LT [address] 2-3 VWS     Standing Status:   Future     Number of Occurrences:   1 Standing Expiration Date:   3/4/2023        An electronic signature was used to authenticate this note.   -- Lida Argueta MD

## 2022-03-19 PROBLEM — I48.0 PAROXYSMAL ATRIAL FIBRILLATION (HCC): Status: ACTIVE | Noted: 2020-11-13

## 2022-03-19 PROBLEM — M85.80 OSTEOPENIA: Status: ACTIVE | Noted: 2020-11-17

## 2022-03-19 PROBLEM — S72.001A CLOSED RIGHT HIP FRACTURE, INITIAL ENCOUNTER (HCC): Status: ACTIVE | Noted: 2020-11-13

## 2022-03-19 PROBLEM — F03.A0 MILD DEMENTIA (HCC): Status: ACTIVE | Noted: 2021-02-06

## 2022-03-20 PROBLEM — S72.009A HIP FRACTURE (HCC): Status: ACTIVE | Noted: 2020-11-14

## 2022-03-20 PROBLEM — D69.2 SENILE PURPURA (HCC): Status: ACTIVE | Noted: 2020-09-26

## 2022-04-21 ENCOUNTER — OFFICE VISIT (OUTPATIENT)
Dept: ORTHOPEDIC SURGERY | Age: 87
End: 2022-04-21
Payer: MEDICARE

## 2022-04-21 VITALS — WEIGHT: 116 LBS | BODY MASS INDEX: 22.78 KG/M2 | HEIGHT: 60 IN

## 2022-04-21 DIAGNOSIS — S72.142D DISPLACED INTERTROCHANTERIC FRACTURE OF LEFT FEMUR, SUBSEQUENT ENCOUNTER FOR CLOSED FRACTURE WITH ROUTINE HEALING: Primary | ICD-10-CM

## 2022-04-21 DIAGNOSIS — Z87.81 STATUS POST FRACTURE OF LEFT HIP: ICD-10-CM

## 2022-04-21 PROCEDURE — 1101F PT FALLS ASSESS-DOCD LE1/YR: CPT | Performed by: ORTHOPAEDIC SURGERY

## 2022-04-21 PROCEDURE — G8432 DEP SCR NOT DOC, RNG: HCPCS | Performed by: ORTHOPAEDIC SURGERY

## 2022-04-21 PROCEDURE — 1090F PRES/ABSN URINE INCON ASSESS: CPT | Performed by: ORTHOPAEDIC SURGERY

## 2022-04-21 PROCEDURE — G8420 CALC BMI NORM PARAMETERS: HCPCS | Performed by: ORTHOPAEDIC SURGERY

## 2022-04-21 PROCEDURE — 99213 OFFICE O/P EST LOW 20 MIN: CPT | Performed by: ORTHOPAEDIC SURGERY

## 2022-04-21 PROCEDURE — G8427 DOCREV CUR MEDS BY ELIG CLIN: HCPCS | Performed by: ORTHOPAEDIC SURGERY

## 2022-04-21 PROCEDURE — G8536 NO DOC ELDER MAL SCRN: HCPCS | Performed by: ORTHOPAEDIC SURGERY

## 2022-04-21 NOTE — PROGRESS NOTES
Angel Pearson (: 3/7/1928) is a 80 y.o. female, patient, here for evaluation of the following chief complaint(s):  Hip Pain       SUBJECTIVE/OBJECTIVE:  Angel Pearson presents today for routine follow-up of left intertrochanteric femur fracture. She is now just over 4 months out from fixation. A few weeks ago she had an increase activity related pain that limited some of her daily walking, but symptoms have since improved. She ambulates daily for exercise. She has continued with some home therapy exercises which she feels are a significant benefit. PHYSICAL EXAM:  Vitals: Ht 5' (1.524 m)   Wt 116 lb (52.6 kg)   BMI 22.65 kg/m²   Body mass index is 22.65 kg/m². 80y.o. year old F, no distress. Left lateral hip and thigh incisions are well-healed. Mild discomfort with resisted flexion of left hip, and extremes of hip rotation. No distal edema. No calf tenderness. No motor or sensory deficits. IMAGING:  Radiographs: XR Results (most recent):  Results from Appointment encounter on 22    XR HIP LT W OR WO PELV 2-3 VWS    Narrative  AP and lateral x-rays of left femur demonstrate no change in position or alignment of intertrochanteric femur fracture compared to x-rays 6 weeks ago. There is increased callus formation evident, but no definite union yet. No lucency around hardware in the femoral head. ASSESSMENT/PLAN:  1. Displaced intertrochanteric fracture of left femur, subsequent encounter for closed fracture with routine healing  -     XR HIP LT W OR WO PELV 2-3 VWS; Future  2. Status post fracture of left hip  -     XR HIP LT W OR WO PELV 2-3 VWS; Future    The xray and exam findings were discussed with the patient today. Healing left intertrochanteric femur fracture. Continue home therapy, ambulation for exercise as tolerated. 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      No follow-ups on file. Review Of Systems  ROS     Positive for: Musculoskeletal    Last edited by Krissy Musa RN on 4/21/2022  2:36 PM. (History)         Patient denies any recent fever, chills, nausea, vomiting, chest pain, or shortness of breath. Allergies   Allergen Reactions    Ciprofloxacin Other (comments)     Bad yeast inf    Codeine Nausea and Vomiting    Hydrocodone Other (comments)     \"burning the esophagus to the point patient can't swallow per family and patient\"    Lexapro [Escitalopram] Other (comments)     lethargy    Morphine Nausea Only    Oxycodone Other (comments)     \"burning of esophagus\"    Penicillins Unknown (comments)       Current Outpatient Medications   Medication Sig    famotidine (PEPCID) 40 mg tablet Take 1 Tab by mouth nightly.  polyethylene glycol (MIRALAX) 17 gram packet Take 1 Packet by mouth daily.  acetaminophen (TYLENOL) 325 mg tablet Take 2 Tabs by mouth every six (6) hours as needed for Pain.  bisacodyL (DULCOLAX) 10 mg supp Insert 10 mg into rectum daily as needed for Constipation (For unrelieved constipation. ).  calcium-vitamin D (OS-CARLOS +D3) 500 mg-200 unit per tablet Take 1 Tab by mouth two (2) times daily (with meals). No current facility-administered medications for this visit.        Past Medical History:   Diagnosis Date    Atrial fibrillation ([address]     Cancer ([address]     radiation and lumpectony    GERD (gastroesophageal reflux disease)     Hearing impaired     Mild dementia ([address] 2/6/2021    Paroxysmal atrial fibrillation ([address]     a.fib 11/2010,    NS 02/2021        Past Surgical History:   Procedure Laterality Date    HX HIP REPLACEMENT Left 12/04/2021    IM NAIL    HX ORTHOPAEDIC      right hip    IR KYPHOPLASTY LUMBAR  8/19/2019    MD BREAST SURGERY PROCEDURE UNLISTED         Family History   Problem Relation Age of Onset    Hypertension Mother     Heart Disease Mother     Cancer Mother         breast    Cancer Father prostate        Social History     Socioeconomic History    Marital status:      Spouse name: Not on file    Number of children: Not on file    Years of education: Not on file    Highest education level: Not on file   Occupational History    Not on file   Tobacco Use    Smoking status: Former Smoker    Smokeless tobacco: Never Used   Substance and Sexual Activity    Alcohol use: No    Drug use: No    Sexual activity: Not on file   Other Topics Concern    Not on file   Social History Narrative    Not on file     Social Determinants of Health     Financial Resource Strain:     Difficulty of Paying Living Expenses: Not on file   Food Insecurity:     Worried About [address] in the Last Year: Not on file    [address] in the Last Year: Not on file   Transportation Needs:     Lack of Transportation (Medical): Not on file    Lack of Transportation (Non-Medical):  Not on file   Physical Activity:     Days of Exercise per Week: Not on file    Minutes of Exercise per Session: Not on file   Stress:     Feeling of Stress : Not on file   Social Connections:     Frequency of Communication with Friends and Family: Not on file    Frequency of Social Gatherings with Friends and Family: Not on file    Attends Pentecostal Services: Not on file    Active Member of [address] [place] [address] or Organizations: Not on file    Attends Club or Organization Meetings: Not on file    Marital Status: Not on file   Intimate Partner Violence:     Fear of Current or Ex-Partner: Not on file    Emotionally Abused: Not on file    Physically Abused: Not on file    Sexually Abused: Not on file   Housing Stability:     Unable to Pay for Housing in the Last Year: Not on file    Number of [place] in the Last Year: Not on file    Unstable Housing in the Last Year: Not on file       Orders Placed This Encounter    XR HIP LT [address] 2-3 VWS     Standing Status:   Future     Number of Occurrences:   1 Standing Expiration Date:   4/22/2023        An electronic signature was used to authenticate this note.   -- Jenn Shah MD

## 2022-04-29 ENCOUNTER — TELEPHONE (OUTPATIENT)
Dept: ORTHOPEDIC SURGERY | Age: 87
End: 2022-04-29

## 2022-04-29 NOTE — TELEPHONE ENCOUNTER
Call from daughter Ms Livia Mota [phone] . Stating that  [facility] states that her mother does not require in home PT. Her mother is 80 yrs old and needs them to come to her home. It was ordered by the doctor and needs to be done due to her condition    Call from Jose Carr w/ [facility]  [phone]. He states that they have been seeing her for for 3 months. She is able to walk 1/2 mile consistently and is independent w/ HEP. Does not meet criteria for continued skilled therapy.  She could benefit from O/P therapy

## 2022-07-22 NOTE — PROGRESS NOTES
[id number] [facility]  Hospitalist Group                                                                                          Hospitalist Progress Note  Priscila Garcia NP  Answering service: [phone] OR [phone] from in house phone        Date of Service:  12/10/2021  NAME:  Mode Dean  :  3/7/1928  MRN:  [id number]      Admission Summary:   Per the H&P,\"80year-old female history of A. fib, breast cancer, GERD, and mild dementia presents to the emergency department today by EMS after concern for a fall yesterday.  She apparently fell in the bathroom yesterday injuring her left hip. The fall occurred while walking in the bathroom with her walker. She fell from a height of ground level. There was no blood loss. The point of impact was the left hip. The pain is present in the left hip. The pain is mild. She was not ambulatory at the scene, and caregiver helped her back to bed.  she had outpatient x-ray yesterday, there are no results available but EMS reports that the patient was called earlier this morning with a positive left hip fracture.  Patient denies any other injuries, she has some mild pain at the left hip. \"    Interval history / Subjective:   Patient seen on rounds     Assessment & Plan:     Left intertrochanteric femur fracture  S/p fall, traumatic fracture. Postop day 6, TFN. Pain currently controlled  PT/OT following, partial weightbearing to left lower extremity  To follow-up with Dr. Emilia Santos in 2 to 3 weeks  Orthopedics have signed off  Family requesting orthopedics to reevaluate the patient regarding drainage from dressing. Ortho has stopped by, for daily dressing changes. I did return to give instruction to the daughter on dressing changes          Atrial fibrillation  Heart rate currently controlled  Not on anticoagulation  Continuing to monitor    GERD  Stable  Continuing famotidine    Acute blood loss anemia  Did receive 1 unit of packed red cells on 2021.     Likely due to femoral fracture/surgery  Hemoglobin remained stable  Continuing to monitor    Mild dementia  High risk for delirium  Reorient as needed    Electrolyte imbalance  Have mild hyponatremia which has since resolved  Phosphorus was also low, this has resolved with repletion      Code status: Full    DVT prophylaxis: LMWH    Care Plan discussed with: Patient/Family, Nurse and      Anticipated Disposition:  PT, OT, RN     Anticipated Discharge: Patient has actually been discharged. Is medically stable however family member, daughter, is appealing the discharge. Physical therapy has recommended SNF however family declines this service. Pending results of the discharge appeal, discharged home with home health. Hospital Problems  Date Reviewed: 2/9/2021          Codes Class Noted POA    Hip fracture [facility]) ICD-10-CM: Z90.547F  ICD-9-CM: 820.8  11/14/2020 Unknown                Review of Systems:   A comprehensive review of systems was negative except for that written in the HPI. Vital Signs:    Last 24hrs VS reviewed since prior progress note. Most recent are:  Visit Vitals  /63 (BP 1 Location: Right upper arm, BP Patient Position: Semi fowlers)   Pulse 87   Temp 98.2 °F (36.8 °C)   Resp 17   Wt 52 kg (114 lb 10.2 oz)   SpO2 94%   BMI 22.39 kg/m²         Intake/Output Summary (Last 24 hours) at 12/10/2021 1335  Last data filed at 12/10/2021 7423  Gross per 24 hour   Intake --   Output 525 ml   Net -525 ml        Physical Examination:     I had a face to face encounter with this patient and independently examined them on 12/10/2021 as outlined below:          Constitutional:  No acute distress, cooperative, pleasant    ENT:  Oral mucosa moist, oropharynx benign. Resp:  CTA bilaterally. No wheezing/rhonchi/rales. No accessory muscle use   CV:  Regular rhythm, normal rate, no murmurs, gallops, rubs    GI:  Soft, non distended, non tender.  normoactive bowel sounds, no hepatosplenomegaly Musculoskeletal:  No edema, warm, 2+ pulses throughout, bulky dressings left lower extremity dry and intact. Left lower extremity, able to wiggle toes, toes warm with brisk capillary refill    Neurologic:  Moves all extremities. AAOx3, CN II-XII reviewed            Data Review:    Review and/or order of clinical lab test  Review and/or order of tests in the radiology section of [facility]      Labs:     No results for input(s): WBC, HGB, HCT, PLT, HGBEXT, HCTEXT, PLTEXT, HGBEXT, HCTEXT, PLTEXT in the last 72 hours. No results for input(s): NA, K, CL, CO2, BUN, CREA, GLU, CA, MG, PHOS, URICA in the last 72 hours. No results for input(s): ALT, AP, TBIL, TBILI, TP, ALB, GLOB, GGT, AML, LPSE in the last 72 hours. No lab exists for component: SGOT, GPT, AMYP, HLPSE  No results for input(s): INR, PTP, APTT, INREXT, INREXT in the last 72 hours. No results for input(s): FE, TIBC, PSAT, FERR in the last 72 hours. No results found for: FOL, RBCF   No results for input(s): PH, PCO2, PO2 in the last 72 hours. No results for input(s): CPK, CKNDX, TROIQ in the last 72 hours.     No lab exists for component: CPKMB  No results found for: CHOL, CHOLX, CHLST, CHOLV, HDL, HDLP, LDL, LDLC, DLDLP, TGLX, TRIGL, TRIGP, CHHD, CHHDX  No results found for: [facility]  Lab Results   Component Value Date/Time    Color YELLOW/STRAW 02/06/2021 03:55 PM    Appearance CLEAR 02/06/2021 03:55 PM    Specific gravity 1.030 02/06/2021 03:55 PM    Specific gravity 1.023 11/14/2020 05:29 AM    pH (UA) 5.0 02/06/2021 03:55 PM    Protein TRACE (A) 02/06/2021 03:55 PM    Glucose Negative 02/06/2021 03:55 PM    Ketone 80 (A) 02/06/2021 03:55 PM    Bilirubin Negative 02/06/2021 03:55 PM    Urobilinogen 0.2 02/06/2021 03:55 PM    Nitrites Negative 02/06/2021 03:55 PM    Leukocyte Esterase Negative 02/06/2021 03:55 PM    Epithelial cells FEW 02/06/2021 03:55 PM    Bacteria Negative 02/06/2021 03:55 PM    WBC 0-4 02/06/2021 03:55 PM    RBC 5-10 02/06/2021 03:55 PM         Medications Reviewed:     Current Facility-Administered Medications   Medication Dose Route Frequency    hydrALAZINE (APRESOLINE) 20 mg/mL injection 10 mg  10 mg IntraVENous Q6H PRN    enoxaparin (LOVENOX) injection 30 mg  30 mg SubCUTAneous Q24H    famotidine (PEPCID) tablet 20 mg  20 mg Oral DAILY    potassium, sodium phosphates (NEUTRA-PHOS) packet 2 Packet  2 Packet Oral BID    acetaminophen (TYLENOL) tablet 650 mg  650 mg Oral Q4H PRN    0.9% sodium chloride infusion 250 mL  250 mL IntraVENous PRN    sodium chloride (NS) flush 5-40 mL  5-40 mL IntraVENous Q8H    sodium chloride (NS) flush 5-40 mL  5-40 mL IntraVENous PRN    naloxone (NARCAN) injection 0.4 mg  0.4 mg IntraVENous PRN    calcium-vitamin D (OS-CARLOS +D3) 500 mg-200 unit per tablet 1 Tablet  1 Tablet Oral TID WITH MEALS    senna-docusate (PERICOLACE) 8.6-50 mg per tablet 1 Tablet  1 Tablet Oral BID    polyethylene glycol (MIRALAX) packet 17 g  17 g Oral DAILY    bisacodyL (DULCOLAX) suppository 10 mg  10 mg Rectal DAILY PRN    ondansetron (ZOFRAN) injection 4 mg  4 mg IntraVENous Q6H PRN     ______________________________________________________________________  EXPECTED LENGTH OF STAY: 4d 7h  ACTUAL LENGTH OF STAY:          7                 Mitch Polanco NP minimum assist (75% patients effort)

## 2023-05-25 RX ORDER — POLYETHYLENE GLYCOL 3350 17 G/17G
17 POWDER, FOR SOLUTION ORAL DAILY
COMMUNITY
Start: 2021-02-17

## 2023-05-25 RX ORDER — BISACODYL 10 MG
10 SUPPOSITORY, RECTAL RECTAL DAILY PRN
COMMUNITY
Start: 2020-11-19

## 2023-05-25 RX ORDER — B-COMPLEX WITH VITAMIN C
1 TABLET ORAL 2 TIMES DAILY WITH MEALS
COMMUNITY
Start: 2020-11-19

## 2023-05-25 RX ORDER — FAMOTIDINE 40 MG/1
40 TABLET, FILM COATED ORAL
COMMUNITY
Start: 2021-03-04

## 2023-05-25 RX ORDER — ACETAMINOPHEN 325 MG/1
650 TABLET ORAL EVERY 6 HOURS PRN
COMMUNITY
Start: 2020-11-19

## 2023-05-25 RX ORDER — FUROSEMIDE 20 MG/1
TABLET ORAL
COMMUNITY
Start: 2023-01-10

## (undated) DEVICE — STERILE POLYISOPRENE POWDER-FREE SURGICAL GLOVES: Brand: PROTEXIS

## (undated) DEVICE — BLADE SAW W098XL354IN THK0047IN CUT THK0047IN SAG

## (undated) DEVICE — CONTAINER,SPECIMEN,3OZ,OR STRL: Brand: MEDLINE

## (undated) DEVICE — SPONGE GZ W4XL4IN COT 12 PLY TYP VII WVN C FLD DSGN

## (undated) DEVICE — STERILE POLYISOPRENE POWDER-FREE SURGICAL GLOVES WITH EMOLLIENT COATING: Brand: PROTEXIS

## (undated) DEVICE — PREP SKN CHLRAPRP APL 26ML STR --

## (undated) DEVICE — COVER,MAYO STAND,STERILE: Brand: MEDLINE

## (undated) DEVICE — Device

## (undated) DEVICE — NEEDLE HYPO 18GA L1.5IN PNK S STL HUB POLYPR SHLD REG BVL

## (undated) DEVICE — 3M™ STERI-DRAPE™ U-DRAPE 1015: Brand: STERI-DRAPE™

## (undated) DEVICE — DERMABOND SKIN ADH 0.7ML -- DERMABOND ADVANCED 12/BX

## (undated) DEVICE — PADDING CST 4INX4YD --

## (undated) DEVICE — TUBING, SUCTION, 1/4" X 10', STRAIGHT: Brand: MEDLINE

## (undated) DEVICE — DRESSING POSTOP AG PRISMASEAL 3.5X6IN

## (undated) DEVICE — 3M™ IOBAN™ 2 ANTIMICROBIAL INCISE DRAPE 6648EZ: Brand: IOBAN™ 2

## (undated) DEVICE — 3.2MM GUIDE WIRE 400MM

## (undated) DEVICE — SUTURE VCRL SZ 2-0 L36IN ABSRB UD L40MM CT 1/2 CIR J957H

## (undated) DEVICE — Z DUP USE 2275493 DRESSING ALGINATE POST OPERATIVE 10X3.5 IN RECT PRIMASEAL

## (undated) DEVICE — KIT EVAC 0.13IN RECT TB DIA10FR 400CC PVC 3 SPR Y CONN DRN

## (undated) DEVICE — TOWEL SURG W17XL27IN STD BLU COT NONFENESTRATED PREWASHED

## (undated) DEVICE — BASIN ST MAJOR-NO CAUTERY: Brand: MEDLINE INDUSTRIES, INC.

## (undated) DEVICE — ROD RMR L950MM DIA2.5MM W/ EXTN BALL TIP

## (undated) DEVICE — GLOVE ORANGE PI 8 1/2   MSG9085

## (undated) DEVICE — REM POLYHESIVE ADULT PATIENT RETURN ELECTRODE: Brand: VALLEYLAB

## (undated) DEVICE — PACK,BASIC,SIRUS,V: Brand: MEDLINE

## (undated) DEVICE — DRAPE C-ARMOUR C-ARM KIT --

## (undated) DEVICE — NEEDLE HYPO 21GA L1.5IN INTRAMUSCULAR S STL LATCH BVL UP

## (undated) DEVICE — GLOVE SURG SZ 85 L12IN FNGR ORTHO 126MIL CRM LTX FREE

## (undated) DEVICE — SUTURE MCRYL SZ 4-0 L27IN ABSRB UD L24MM PS-1 3/8 CIR PRIM Y935H

## (undated) DEVICE — BLADE ELECTRODE: Brand: EDGE

## (undated) DEVICE — PINNACLE GRIPTION ACETABULAR SHELL MULTI-HOLE 48MM OD
Type: IMPLANTABLE DEVICE | Site: HIP | Status: NON-FUNCTIONAL
Brand: PINNACLE GRIPTION
Removed: 2021-02-06

## (undated) DEVICE — PINNACLE CANCELLOUS BONE SCREW 6.5MM X 25MM
Type: IMPLANTABLE DEVICE | Site: HIP | Status: NON-FUNCTIONAL
Brand: PINNACLE
Removed: 2021-02-06

## (undated) DEVICE — TOTAL TRAY, 16FR 10ML SIL FOLEY, URN: Brand: MEDLINE

## (undated) DEVICE — INFECTION CONTROL KIT SYS

## (undated) DEVICE — ROCKER SWITCH PENCIL BLADE ELECTRODE, HOLSTER: Brand: EDGE

## (undated) DEVICE — SUTURE VCRL 1 L27IN ABSRB CT BRAID COAT UD J281H

## (undated) DEVICE — SOL IRR STRL H2O 1000ML BTL --

## (undated) DEVICE — SUTURE VCRL SZ 1 L27IN ABSRB UD L36MM CP-1 1/2 CIR REV CUT J268H

## (undated) DEVICE — SOLUTION IRRIG 1000ML STRL H2O USP PLAS POUR BTL

## (undated) DEVICE — BIT DRL L145MM DIA4.2MM NONSTERILE 3 FLUT NDL PNT QUIK CPL

## (undated) DEVICE — DRAPE,U/ SHT,SPLIT,PLAS,STERIL: Brand: MEDLINE

## (undated) DEVICE — GLOVE SURG SZ 65 L12IN FNGR THK94MIL STD WHT LTX FREE

## (undated) DEVICE — SUTURE STRATAFIX SYMMETRIC PDS + SZ 1 L18IN ABSRB VLT L48MM SXPP1A400

## (undated) DEVICE — SYR LR LCK 1ML GRAD NSAF 30ML --

## (undated) DEVICE — 1016 S-DRAPE IRRIG POUCH 10/BOX: Brand: STERI-DRAPE™

## (undated) DEVICE — 6619 2 PTNT ISO SYS INCISE AREA&LT;(&GT;&&LT;)&GT;P: Brand: STERI-DRAPE™ IOBAN™ 2

## (undated) DEVICE — PINNACLE HIP SOLUTIONS ALTRX POLYETHYLENE ACETABULAR LINER +4 NEUTRAL 32MM ID 48MM OD
Type: IMPLANTABLE DEVICE | Site: HIP | Status: NON-FUNCTIONAL
Brand: PINNACLE ALTRX
Removed: 2021-02-06

## (undated) DEVICE — YANKAUER,TAPERED BULBOUS TIP,W/O VENT: Brand: MEDLINE

## (undated) DEVICE — INTENDED FOR TISSUE SEPARATION, AND OTHER PROCEDURES THAT REQUIRE A SHARP SURGICAL BLADE TO PUNCTURE OR CUT.: Brand: BARD-PARKER ® CARBON RIB-BACK BLADES

## (undated) DEVICE — SOLUTION SURG PREP 26 CC PURPREP

## (undated) DEVICE — 3M™ IOBAN™ 2 ANTIMICROBIAL INCISE DRAPE 6651EZ: Brand: IOBAN™ 2

## (undated) DEVICE — T4 HOOD

## (undated) DEVICE — DRAPE,REIN 53X77,STERILE: Brand: MEDLINE

## (undated) DEVICE — SUT ETHLN 3-0 18IN PS1 BLK --

## (undated) DEVICE — DRAPE XR C ARM 41X74IN LF --

## (undated) DEVICE — SUTURE VCRL SZ 0 L27IN ABSRB UD L36MM CP-1 1/2 CIR REV CUT J267H

## (undated) DEVICE — PADDING CAST SPEC 6INX4YD COT --

## (undated) DEVICE — BIT DRL L300MM DIA7.3MM QUIK CPL W/O STP REUSE FOR

## (undated) DEVICE — DRAPE PT ISOLATN 130 IN X 96 IN

## (undated) DEVICE — Z DUPLICATE USE 2275497 DRSG POSTOP PRMSL AG 3.5X6IN

## (undated) DEVICE — 3M™ TEGADERM™ TRANSPARENT FILM DRESSING FRAME STYLE, 1626W, 4 IN X 4-3/4 IN (10 CM X 12 CM), 50/CT 4CT/CASE: Brand: 3M™ TEGADERM™

## (undated) DEVICE — SUTURE VCRL SZ 2-0 L27IN ABSRB UD L36MM CP-1 1/2 CIR REV J266H

## (undated) DEVICE — DRESSING PETRO W3XL8IN N ADH OIL EMUL GZ CURAD

## (undated) DEVICE — SOL IRR SOD CL 0.9% 3000ML --

## (undated) DEVICE — SUTURE ETHBND EXCEL SZ 2 L30IN NONABSORBABLE GRN L40MM V-37 MX69G

## (undated) DEVICE — HANDPIECE SET WITH BONE CLEANING TIP AND SUCTION TUBE: Brand: INTERPULSE

## (undated) DEVICE — SURGICAL PROCEDURE PACK BASIN MAJ SET CUST NO CAUT

## (undated) DEVICE — SOLUTION IRRIG 1000ML 0.9% SOD CHL USP POUR PLAS BTL

## (undated) DEVICE — SOLUTION IV 1000ML 0.9% SOD CHL

## (undated) DEVICE — GOWN,PREVENTION PLUS,XLN/2XL,ST,22/CS: Brand: MEDLINE

## (undated) DEVICE — BNDG ELAS HK LOOP 4X5YD NS -- MATRIX

## (undated) DEVICE — DRESSING,GAUZE,XEROFORM,CURAD,1"X8",ST: Brand: CURAD

## (undated) DEVICE — PINNACLE CANCELLOUS BONE SCREW 6.5MM X 30MM
Type: IMPLANTABLE DEVICE | Site: HIP | Status: NON-FUNCTIONAL
Brand: PINNACLE
Removed: 2021-02-06

## (undated) DEVICE — SUTURE MCRYL SZ 3-0 L27IN ABSRB UD L24MM PS-1 3/8 CIR PRIM Y936H

## (undated) DEVICE — SCRUB DRY SURG EZ SCRUB BRUSH PREOPERATIVE GRN

## (undated) DEVICE — SYR 20ML LL STRL LF --